# Patient Record
Sex: FEMALE | Race: WHITE | NOT HISPANIC OR LATINO | URBAN - METROPOLITAN AREA
[De-identification: names, ages, dates, MRNs, and addresses within clinical notes are randomized per-mention and may not be internally consistent; named-entity substitution may affect disease eponyms.]

---

## 2017-03-20 ENCOUNTER — EMERGENCY (EMERGENCY)
Facility: HOSPITAL | Age: 27
LOS: 0 days | Discharge: HOME | End: 2017-03-21

## 2017-06-27 DIAGNOSIS — F41.8 OTHER SPECIFIED ANXIETY DISORDERS: ICD-10-CM

## 2017-06-27 DIAGNOSIS — N39.0 URINARY TRACT INFECTION, SITE NOT SPECIFIED: ICD-10-CM

## 2017-06-27 DIAGNOSIS — K21.9 GASTRO-ESOPHAGEAL REFLUX DISEASE WITHOUT ESOPHAGITIS: ICD-10-CM

## 2017-06-27 DIAGNOSIS — Z87.891 PERSONAL HISTORY OF NICOTINE DEPENDENCE: ICD-10-CM

## 2017-06-27 DIAGNOSIS — R30.0 DYSURIA: ICD-10-CM

## 2017-08-08 ENCOUNTER — EMERGENCY (EMERGENCY)
Facility: HOSPITAL | Age: 27
LOS: 0 days | Discharge: HOME | End: 2017-08-08
Admitting: INTERNAL MEDICINE

## 2017-08-08 DIAGNOSIS — Z88.8 ALLERGY STATUS TO OTHER DRUGS, MEDICAMENTS AND BIOLOGICAL SUBSTANCES STATUS: ICD-10-CM

## 2017-08-08 DIAGNOSIS — F17.200 NICOTINE DEPENDENCE, UNSPECIFIED, UNCOMPLICATED: ICD-10-CM

## 2017-08-08 DIAGNOSIS — F31.76 BIPOLAR DISORDER, IN FULL REMISSION, MOST RECENT EPISODE DEPRESSED: ICD-10-CM

## 2017-08-08 DIAGNOSIS — K21.9 GASTRO-ESOPHAGEAL REFLUX DISEASE WITHOUT ESOPHAGITIS: ICD-10-CM

## 2017-08-08 DIAGNOSIS — E03.9 HYPOTHYROIDISM, UNSPECIFIED: ICD-10-CM

## 2017-08-08 DIAGNOSIS — J40 BRONCHITIS, NOT SPECIFIED AS ACUTE OR CHRONIC: ICD-10-CM

## 2017-08-08 DIAGNOSIS — H92.02 OTALGIA, LEFT EAR: ICD-10-CM

## 2017-08-08 DIAGNOSIS — Z79.899 OTHER LONG TERM (CURRENT) DRUG THERAPY: ICD-10-CM

## 2017-08-08 DIAGNOSIS — F10.20 ALCOHOL DEPENDENCE, UNCOMPLICATED: ICD-10-CM

## 2017-08-08 DIAGNOSIS — F32.9 MAJOR DEPRESSIVE DISORDER, SINGLE EPISODE, UNSPECIFIED: ICD-10-CM

## 2017-08-08 DIAGNOSIS — R05 COUGH: ICD-10-CM

## 2017-10-19 ENCOUNTER — EMERGENCY (EMERGENCY)
Facility: HOSPITAL | Age: 27
LOS: 0 days | Discharge: HOME | End: 2017-10-20

## 2017-10-19 DIAGNOSIS — K21.9 GASTRO-ESOPHAGEAL REFLUX DISEASE WITHOUT ESOPHAGITIS: ICD-10-CM

## 2017-10-19 DIAGNOSIS — N39.0 URINARY TRACT INFECTION, SITE NOT SPECIFIED: ICD-10-CM

## 2017-10-19 DIAGNOSIS — F31.76 BIPOLAR DISORDER, IN FULL REMISSION, MOST RECENT EPISODE DEPRESSED: ICD-10-CM

## 2017-10-19 DIAGNOSIS — T40.1X1A POISONING BY HEROIN, ACCIDENTAL (UNINTENTIONAL), INITIAL ENCOUNTER: ICD-10-CM

## 2017-10-19 DIAGNOSIS — R41.82 ALTERED MENTAL STATUS, UNSPECIFIED: ICD-10-CM

## 2017-10-19 DIAGNOSIS — Z79.899 OTHER LONG TERM (CURRENT) DRUG THERAPY: ICD-10-CM

## 2017-10-19 DIAGNOSIS — E05.90 THYROTOXICOSIS, UNSPECIFIED WITHOUT THYROTOXIC CRISIS OR STORM: ICD-10-CM

## 2017-10-19 DIAGNOSIS — F32.9 MAJOR DEPRESSIVE DISORDER, SINGLE EPISODE, UNSPECIFIED: ICD-10-CM

## 2017-10-19 DIAGNOSIS — Z88.8 ALLERGY STATUS TO OTHER DRUGS, MEDICAMENTS AND BIOLOGICAL SUBSTANCES: ICD-10-CM

## 2017-10-19 DIAGNOSIS — T40.5X1A POISONING BY COCAINE, ACCIDENTAL (UNINTENTIONAL), INITIAL ENCOUNTER: ICD-10-CM

## 2017-10-19 DIAGNOSIS — F10.20 ALCOHOL DEPENDENCE, UNCOMPLICATED: ICD-10-CM

## 2017-10-19 DIAGNOSIS — Z87.891 PERSONAL HISTORY OF NICOTINE DEPENDENCE: ICD-10-CM

## 2017-12-23 ENCOUNTER — EMERGENCY (EMERGENCY)
Facility: HOSPITAL | Age: 27
LOS: 0 days | Discharge: HOME | End: 2017-12-23

## 2017-12-23 DIAGNOSIS — Z79.899 OTHER LONG TERM (CURRENT) DRUG THERAPY: ICD-10-CM

## 2017-12-23 DIAGNOSIS — F10.20 ALCOHOL DEPENDENCE, UNCOMPLICATED: ICD-10-CM

## 2017-12-23 DIAGNOSIS — T39.311A POISONING BY PROPIONIC ACID DERIVATIVES, ACCIDENTAL (UNINTENTIONAL), INITIAL ENCOUNTER: ICD-10-CM

## 2017-12-23 DIAGNOSIS — Z87.891 PERSONAL HISTORY OF NICOTINE DEPENDENCE: ICD-10-CM

## 2017-12-23 DIAGNOSIS — E03.9 HYPOTHYROIDISM, UNSPECIFIED: ICD-10-CM

## 2017-12-23 DIAGNOSIS — F31.76 BIPOLAR DISORDER, IN FULL REMISSION, MOST RECENT EPISODE DEPRESSED: ICD-10-CM

## 2017-12-23 DIAGNOSIS — Z86.711 PERSONAL HISTORY OF PULMONARY EMBOLISM: ICD-10-CM

## 2017-12-23 DIAGNOSIS — Z88.8 ALLERGY STATUS TO OTHER DRUGS, MEDICAMENTS AND BIOLOGICAL SUBSTANCES STATUS: ICD-10-CM

## 2017-12-23 DIAGNOSIS — F32.9 MAJOR DEPRESSIVE DISORDER, SINGLE EPISODE, UNSPECIFIED: ICD-10-CM

## 2017-12-23 DIAGNOSIS — K21.9 GASTRO-ESOPHAGEAL REFLUX DISEASE WITHOUT ESOPHAGITIS: ICD-10-CM

## 2018-01-18 ENCOUNTER — OUTPATIENT (OUTPATIENT)
Dept: OUTPATIENT SERVICES | Facility: HOSPITAL | Age: 28
LOS: 1 days | Discharge: HOME | End: 2018-01-18

## 2018-01-18 DIAGNOSIS — F18.20 INHALANT DEPENDENCE, UNCOMPLICATED: ICD-10-CM

## 2018-01-18 DIAGNOSIS — F13.20 SEDATIVE, HYPNOTIC OR ANXIOLYTIC DEPENDENCE, UNCOMPLICATED: ICD-10-CM

## 2018-01-18 DIAGNOSIS — F31.81 BIPOLAR II DISORDER: ICD-10-CM

## 2018-02-04 DIAGNOSIS — F10.20 ALCOHOL DEPENDENCE, UNCOMPLICATED: ICD-10-CM

## 2018-02-04 DIAGNOSIS — F31.76 BIPOLAR DISORDER, IN FULL REMISSION, MOST RECENT EPISODE DEPRESSED: ICD-10-CM

## 2018-06-17 ENCOUNTER — EMERGENCY (EMERGENCY)
Facility: HOSPITAL | Age: 28
LOS: 0 days | Discharge: HOME | End: 2018-06-17
Attending: EMERGENCY MEDICINE | Admitting: EMERGENCY MEDICINE

## 2018-06-17 VITALS
SYSTOLIC BLOOD PRESSURE: 119 MMHG | HEART RATE: 94 BPM | OXYGEN SATURATION: 98 % | RESPIRATION RATE: 18 BRPM | TEMPERATURE: 97 F | DIASTOLIC BLOOD PRESSURE: 63 MMHG

## 2018-06-17 VITALS
HEART RATE: 88 BPM | RESPIRATION RATE: 18 BRPM | OXYGEN SATURATION: 97 % | SYSTOLIC BLOOD PRESSURE: 123 MMHG | TEMPERATURE: 98 F | DIASTOLIC BLOOD PRESSURE: 58 MMHG

## 2018-06-17 DIAGNOSIS — E03.9 HYPOTHYROIDISM, UNSPECIFIED: ICD-10-CM

## 2018-06-17 DIAGNOSIS — E11.9 TYPE 2 DIABETES MELLITUS WITHOUT COMPLICATIONS: ICD-10-CM

## 2018-06-17 DIAGNOSIS — Z79.899 OTHER LONG TERM (CURRENT) DRUG THERAPY: ICD-10-CM

## 2018-06-17 DIAGNOSIS — Z79.84 LONG TERM (CURRENT) USE OF ORAL HYPOGLYCEMIC DRUGS: ICD-10-CM

## 2018-06-17 DIAGNOSIS — Z88.8 ALLERGY STATUS TO OTHER DRUGS, MEDICAMENTS AND BIOLOGICAL SUBSTANCES: ICD-10-CM

## 2018-06-17 DIAGNOSIS — R07.89 OTHER CHEST PAIN: ICD-10-CM

## 2018-06-17 LAB
ALBUMIN SERPL ELPH-MCNC: 3.1 G/DL — LOW (ref 3.5–5.2)
ALP SERPL-CCNC: 56 U/L — SIGNIFICANT CHANGE UP (ref 30–115)
ALT FLD-CCNC: 26 U/L — SIGNIFICANT CHANGE UP (ref 0–41)
ANION GAP SERPL CALC-SCNC: 13 MMOL/L — SIGNIFICANT CHANGE UP (ref 7–14)
APTT BLD: 36.7 SEC — SIGNIFICANT CHANGE UP (ref 27–39.2)
AST SERPL-CCNC: 18 U/L — SIGNIFICANT CHANGE UP (ref 0–41)
BASOPHILS # BLD AUTO: 0.01 K/UL — SIGNIFICANT CHANGE UP (ref 0–0.2)
BASOPHILS NFR BLD AUTO: 0.1 % — SIGNIFICANT CHANGE UP (ref 0–1)
BILIRUB SERPL-MCNC: <0.2 MG/DL — SIGNIFICANT CHANGE UP (ref 0.2–1.2)
BUN SERPL-MCNC: 9 MG/DL — LOW (ref 10–20)
CALCIUM SERPL-MCNC: 8.7 MG/DL — SIGNIFICANT CHANGE UP (ref 8.5–10.1)
CHLORIDE SERPL-SCNC: 104 MMOL/L — SIGNIFICANT CHANGE UP (ref 98–110)
CK MB CFR SERPL CALC: <1 NG/ML — SIGNIFICANT CHANGE UP (ref 0.6–6.3)
CK SERPL-CCNC: 57 U/L — SIGNIFICANT CHANGE UP (ref 0–225)
CO2 SERPL-SCNC: 22 MMOL/L — SIGNIFICANT CHANGE UP (ref 17–32)
CREAT SERPL-MCNC: 0.6 MG/DL — LOW (ref 0.7–1.5)
D DIMER BLD IA.RAPID-MCNC: 96 NG/ML DDU — SIGNIFICANT CHANGE UP (ref 0–230)
EOSINOPHIL # BLD AUTO: 0.27 K/UL — SIGNIFICANT CHANGE UP (ref 0–0.7)
EOSINOPHIL NFR BLD AUTO: 3.2 % — SIGNIFICANT CHANGE UP (ref 0–8)
GLUCOSE SERPL-MCNC: 177 MG/DL — HIGH (ref 70–99)
HCT VFR BLD CALC: 38.2 % — SIGNIFICANT CHANGE UP (ref 37–47)
HGB BLD-MCNC: 13 G/DL — SIGNIFICANT CHANGE UP (ref 12–16)
IMM GRANULOCYTES NFR BLD AUTO: 0.1 % — SIGNIFICANT CHANGE UP (ref 0.1–0.3)
INR BLD: 1.04 RATIO — SIGNIFICANT CHANGE UP (ref 0.65–1.3)
LYMPHOCYTES # BLD AUTO: 2.45 K/UL — SIGNIFICANT CHANGE UP (ref 1.2–3.4)
LYMPHOCYTES # BLD AUTO: 29.3 % — SIGNIFICANT CHANGE UP (ref 20.5–51.1)
MCHC RBC-ENTMCNC: 28.2 PG — SIGNIFICANT CHANGE UP (ref 27–31)
MCHC RBC-ENTMCNC: 34 G/DL — SIGNIFICANT CHANGE UP (ref 32–37)
MCV RBC AUTO: 82.9 FL — SIGNIFICANT CHANGE UP (ref 81–99)
MONOCYTES # BLD AUTO: 0.63 K/UL — HIGH (ref 0.1–0.6)
MONOCYTES NFR BLD AUTO: 7.5 % — SIGNIFICANT CHANGE UP (ref 1.7–9.3)
NEUTROPHILS # BLD AUTO: 5 K/UL — SIGNIFICANT CHANGE UP (ref 1.4–6.5)
NEUTROPHILS NFR BLD AUTO: 59.8 % — SIGNIFICANT CHANGE UP (ref 42.2–75.2)
PLATELET # BLD AUTO: 227 K/UL — SIGNIFICANT CHANGE UP (ref 130–400)
POTASSIUM SERPL-MCNC: 4.1 MMOL/L — SIGNIFICANT CHANGE UP (ref 3.5–5)
POTASSIUM SERPL-SCNC: 4.1 MMOL/L — SIGNIFICANT CHANGE UP (ref 3.5–5)
PROT SERPL-MCNC: 6.6 G/DL — SIGNIFICANT CHANGE UP (ref 6–8)
PROTHROM AB SERPL-ACNC: 11.2 SEC — SIGNIFICANT CHANGE UP (ref 9.95–12.87)
RBC # BLD: 4.61 M/UL — SIGNIFICANT CHANGE UP (ref 4.2–5.4)
RBC # FLD: 12.6 % — SIGNIFICANT CHANGE UP (ref 11.5–14.5)
SODIUM SERPL-SCNC: 139 MMOL/L — SIGNIFICANT CHANGE UP (ref 135–146)
TROPONIN T SERPL-MCNC: <0.01 NG/ML — SIGNIFICANT CHANGE UP
WBC # BLD: 8.37 K/UL — SIGNIFICANT CHANGE UP (ref 4.8–10.8)
WBC # FLD AUTO: 8.37 K/UL — SIGNIFICANT CHANGE UP (ref 4.8–10.8)

## 2018-06-17 NOTE — ED ADULT NURSE NOTE - CHIEF COMPLAINT QUOTE
Pt came c/o left sided chest pain started 3 hours ago, denies SOB, has history of PE, received ASA x 1 from [facility].

## 2018-06-17 NOTE — ED ADULT TRIAGE NOTE - CHIEF COMPLAINT QUOTE
Pt came c/o left sided chest pain started 3 hours ago, denies SOB, has history of PE, received ASA x 1 from [facility].

## 2018-06-17 NOTE — ED PROVIDER NOTE - OBJECTIVE STATEMENT
28yo F with PMHx PE/DVT (6 years ago, no longer on AC), hyperthyroid, PCOS, hep C / IVDA / substance abuse (meth, delgado dust), bipolar, p/w chest pain x3 hours. Pain is left-sided, felt like "fluttering" then pressure. Pt was concerned because she felt similar symptoms when she had PE years ago. Patient currently checked into [facility] for rehab, last IVDA was 11 days ago (injects into arm and breast). Denies fever, palpitations, SOB, cough. Unknown LMP, pt states are irregular. Denies headache, lightheadedness, nausea, vomiting, diarrhea, abd pain, dysuria, hematuria.

## 2018-06-17 NOTE — ED PROVIDER NOTE - NS ED ROS FT
Constitutional: No fever, chills.  ENMT:  No neck pain  Cardiac:  +chest pain  Respiratory:  No cough, SOB  GI:  No nausea, vomiting, diarrhea, abdominal pain  :  No dysuria, hematuria  MS:  No back pain  Neuro:  No headache or lightheadedness  Skin:  No skin rash  Endocrine: +h/o hyperthyroid  Except as documented in the HPI,  all other systems are negative

## 2018-06-17 NOTE — ED PROVIDER NOTE - PHYSICAL EXAMINATION
Vital signs reviewed  GENERAL: Patient well appearing, NAD, lying in stretcher comfortably  HEAD: NCAT  ENT: MMM  NECK: Supple, non tender  CHEST: Mild TTP over anterior chest wall.   RESPIRATORY: Normal respiratory effort. CTA B/L. No wheezing, rales, rhonchi  CARDIOVASCULAR: No murmur. Regular rate and rhythm. Normal S1/S2  ABDOMEN: Soft. Nondistended. Nontender. No guarding or rebound  MUSCULOSKELETAL/EXTREMITIES: Brisk cap refill. 2+ radial pulses. No leg edema  SKIN:  Warm and dry. Ecchymosis over right breast (injection site) without erythema, fluctuance, induration, nontender.  NEURO: AAOx3. No gross FND  PSYCHIATRIC: Cooperative. Affect appropriate

## 2018-06-17 NOTE — ED PROVIDER NOTE - ATTENDING CONTRIBUTION TO CARE
26 yo female h/o hyperthyroidism, heroin abuse ( currently in rehab), DVT/PE 7 years ago (used to take birth control pills, took, coumadin for 6 months, no clotting episodes since) sent here for evaluation of brief point left sided CP while sitting,.  Resolved spontaneously, non-radiating, no associated SOB, cough, hemoptysis, leg pain or swelling, no change in exercise tolerance.   Asymptomatic at present,  Well-appearing, obese young female, NAD, PERRL, mmm, nml work of breathing, lungs CTA b/l, equal air entry, speaking full sentences, RRR, no leg swelling or calf ttp, capillary refill < 2 sec, distal pulses intact, ambulating in ED without dyspnea or tachypnea Work up negative in ED including a normal d-dimer.  Results were d/c patient , strict return precautions given,  She verbalized understanding and is amenable with the plan.

## 2018-06-17 NOTE — ED ADULT NURSE NOTE - OBJECTIVE STATEMENT
Pt complaining of left sided chest pain radiating to back, +sob, +nausea/vomiting last s/s total of 30 minutes

## 2018-07-14 ENCOUNTER — EMERGENCY (EMERGENCY)
Facility: HOSPITAL | Age: 28
LOS: 0 days | Discharge: HOME | End: 2018-07-14
Attending: EMERGENCY MEDICINE

## 2018-07-14 VITALS
OXYGEN SATURATION: 95 % | RESPIRATION RATE: 18 BRPM | DIASTOLIC BLOOD PRESSURE: 58 MMHG | SYSTOLIC BLOOD PRESSURE: 112 MMHG | HEART RATE: 81 BPM

## 2018-07-14 VITALS
WEIGHT: 293 LBS | RESPIRATION RATE: 18 BRPM | TEMPERATURE: 99 F | HEART RATE: 123 BPM | SYSTOLIC BLOOD PRESSURE: 170 MMHG | HEIGHT: 67 IN | DIASTOLIC BLOOD PRESSURE: 113 MMHG | OXYGEN SATURATION: 98 %

## 2018-07-14 DIAGNOSIS — Z02.9 ENCOUNTER FOR ADMINISTRATIVE EXAMINATIONS, UNSPECIFIED: ICD-10-CM

## 2018-07-14 LAB
ALBUMIN SERPL ELPH-MCNC: 3.6 G/DL — SIGNIFICANT CHANGE UP (ref 3.5–5.2)
ALP SERPL-CCNC: 76 U/L — SIGNIFICANT CHANGE UP (ref 30–115)
ALT FLD-CCNC: 22 U/L — SIGNIFICANT CHANGE UP (ref 0–41)
ANION GAP SERPL CALC-SCNC: 18 MMOL/L — HIGH (ref 7–14)
APAP SERPL-MCNC: <5 UG/ML — LOW (ref 10–30)
AST SERPL-CCNC: 21 U/L — SIGNIFICANT CHANGE UP (ref 0–41)
BILIRUB SERPL-MCNC: 0.3 MG/DL — SIGNIFICANT CHANGE UP (ref 0.2–1.2)
BUN SERPL-MCNC: 14 MG/DL — SIGNIFICANT CHANGE UP (ref 10–20)
CALCIUM SERPL-MCNC: 8.9 MG/DL — SIGNIFICANT CHANGE UP (ref 8.5–10.1)
CHLORIDE SERPL-SCNC: 102 MMOL/L — SIGNIFICANT CHANGE UP (ref 98–110)
CK MB CFR SERPL CALC: 1.3 NG/ML — SIGNIFICANT CHANGE UP (ref 0.6–6.3)
CK SERPL-CCNC: 122 U/L — SIGNIFICANT CHANGE UP (ref 0–225)
CO2 SERPL-SCNC: 21 MMOL/L — SIGNIFICANT CHANGE UP (ref 17–32)
CREAT SERPL-MCNC: 0.9 MG/DL — SIGNIFICANT CHANGE UP (ref 0.7–1.5)
ETHANOL SERPL-MCNC: <10 MG/DL — HIGH
GLUCOSE SERPL-MCNC: 138 MG/DL — HIGH (ref 70–99)
HCT VFR BLD CALC: 38 % — SIGNIFICANT CHANGE UP (ref 37–47)
HGB BLD-MCNC: 12.6 G/DL — SIGNIFICANT CHANGE UP (ref 12–16)
MCHC RBC-ENTMCNC: 27.6 PG — SIGNIFICANT CHANGE UP (ref 27–31)
MCHC RBC-ENTMCNC: 33.2 G/DL — SIGNIFICANT CHANGE UP (ref 32–37)
MCV RBC AUTO: 83.3 FL — SIGNIFICANT CHANGE UP (ref 81–99)
NRBC # BLD: 0 /100 WBCS — SIGNIFICANT CHANGE UP (ref 0–0)
PLATELET # BLD AUTO: 319 K/UL — SIGNIFICANT CHANGE UP (ref 130–400)
POTASSIUM SERPL-MCNC: 3.7 MMOL/L — SIGNIFICANT CHANGE UP (ref 3.5–5)
POTASSIUM SERPL-SCNC: 3.7 MMOL/L — SIGNIFICANT CHANGE UP (ref 3.5–5)
PROT SERPL-MCNC: 7.7 G/DL — SIGNIFICANT CHANGE UP (ref 6–8)
RBC # BLD: 4.56 M/UL — SIGNIFICANT CHANGE UP (ref 4.2–5.4)
RBC # FLD: 13.1 % — SIGNIFICANT CHANGE UP (ref 11.5–14.5)
SALICYLATES SERPL-MCNC: <0.3 MG/DL — LOW (ref 4–30)
SODIUM SERPL-SCNC: 141 MMOL/L — SIGNIFICANT CHANGE UP (ref 135–146)
TROPONIN T SERPL-MCNC: <0.01 NG/ML — SIGNIFICANT CHANGE UP
WBC # BLD: 13.77 K/UL — HIGH (ref 4.8–10.8)
WBC # FLD AUTO: 13.77 K/UL — HIGH (ref 4.8–10.8)

## 2018-07-14 RX ORDER — SODIUM CHLORIDE 9 MG/ML
1000 INJECTION INTRAMUSCULAR; INTRAVENOUS; SUBCUTANEOUS ONCE
Qty: 0 | Refills: 0 | Status: COMPLETED | OUTPATIENT
Start: 2018-07-14 | End: 2018-07-14

## 2018-07-14 RX ADMIN — SODIUM CHLORIDE 1000 MILLILITER(S): 9 INJECTION INTRAMUSCULAR; INTRAVENOUS; SUBCUTANEOUS at 01:38

## 2018-07-14 NOTE — ED PROVIDER NOTE - NS ED ROS FT
Review of Systems  Constitutional:  No fever or chills.   Eyes:  Negative.   ENMT:  No nasal congestion, discharge, or throat pain.  Cardiac:  No chest pain, palpitations, or edema.  Respiratory:  No dyspnea, wheezing, or cough. No hemoptysis.  GI:  No vomiting, diarrhea, or abdominal pain. No melena or hematochezia.  :  No dysuria or hematuria.   Musculoskeletal:  No joint swelling, joint pain, or back pain.  Skin:  No skin rash, jaundice, or lesions.  Neuro:  No headache, loss of sensation, or focal weakness.

## 2018-07-14 NOTE — ED PROVIDER NOTE - OBJECTIVE STATEMENT
27f w a hx of hypeorthyroid, HepC, PCOS, DM, & cocaine/heroin abuse presents after unintentional IV heroin OD just prior to arrival. Pt found by friend and given naloxone by EMS w return to baseline mental status. Pt reports feeling well at this time and denies any symptoms. Patient denies SI/HI, denies any self-harm attempt & denies AV hallucinations. No trauma. 27f w a hx of hyperthyroid, HepC, PCOS, DM, & cocaine/heroin abuse presents after unintentional IV heroin OD just prior to arrival. Pt found by friend and given naloxone by EMS w return to baseline mental status. Pt reports feeling well at this time and denies any symptoms. Patient denies SI/HI, denies any self-harm attempt & denies AV hallucinations. No trauma.

## 2018-07-14 NOTE — ED PROVIDER NOTE - MEDICAL DECISION MAKING DETAILS
27f w unintentional IV heroin overdose now back to baseline after naloxone. Also w hx of cocaine use. Pt initially mild hypertensive & tachycardic possibly from using cocaine as well. Labs & EKG reviewed. IV fluids given. Pt observed w resolution of symptoms. Pt advised regarding symptomatic/supportive care, importance of PMD/Detox f/u, and symptoms to prompt ED return.

## 2018-07-14 NOTE — ED PROVIDER NOTE - CARE PLAN
Principal Discharge DX:	Accidental overdose of heroin, initial encounter  Assessment and plan of treatment:	27f w unintentional IV heroin overdose now back to baseline after naloxone. nontoxic appearing, nonfocal neuro, no evidence of trauma. --CBC, CMP, HCG, ASA/APAP, EKG. -- observe/re-assess, likely dc symptomatic and supportive care, f/u PMD/Detox  Secondary Diagnosis:	Cocaine abuse  Secondary Diagnosis:	Opioid abuse

## 2018-07-14 NOTE — ED PROVIDER NOTE - PLAN OF CARE
27f w unintentional IV heroin overdose now back to baseline after naloxone. nontoxic appearing, nonfocal neuro, no evidence of trauma. --CBC, CMP, HCG, ASA/APAP, EKG. -- observe/re-assess, likely dc symptomatic and supportive care, f/u PMD/Detox

## 2018-07-14 NOTE — ED PROVIDER NOTE - PMH
Cocaine abuse    Depression    Diabetes    Hepatitis C    Hyperthyroidism    Opioid abuse    PCOS (polycystic ovarian syndrome)

## 2018-07-14 NOTE — ED PROVIDER NOTE - PHYSICAL EXAMINATION
Physical Exam  General: Awake, alert, NAD, WDWN, NCAT, no skull/facial tender, no step-offs, no raccoon/raza, non-toxic appearing  Eyes: PERRL 2mm, EOMI, no icterus/nystagmus, lids and conjunctivae are normal  ENT: External inspection normal, pink/dry membranes, pharynx normal  CV: S1S2, regular rate and rhythm, no murmur/gallops/rubs, no JVD, 2+ pulses b/l, no edema/cords/homans, warm/well-perfused  Respiratory: Normal respiratory rate/effort, no respiratory distress, normal voice, speaking full sentences, lungs clear to auscultation b/l, no wheezing/rales/rhonchi, no retractions, no stridor  Abdomen: Soft abdomen, no tender/distended/guarding/rebound, no CVA tender  Musculoskeletal: FROM all 4 extremities, N/V intact, pelvis stable, no TLS spinal tender/deform/step-offs, no mohit tender/deform, stable gait  Neck: FROM neck, supple, no meningismus, trachea midline, no JVD, no cspine tender/step-offs  Integumentary: Color normal for race, warm and dry, no rash  Neuro: Oriented x3, CN 2-12 intact, normal motor, normal sensory, normal gait, normal cerebellar, no clonus/rigidity/hyper-reflexia.  Psych: Oriented x3, affect normal, denies SI/HI, denies AV hallucinations

## 2018-07-14 NOTE — ED ADULT NURSE REASSESSMENT NOTE - NS ED NURSE REASSESS COMMENT FT1
Pt received awake in bed AAOx3. Pt ambulating steadily to bathroom w/ writer; c/o nausea.
Pt discharged; V/S reassessed and documented. Pt verbalized feeling better; AAOx3 breathing RA w/ normal WOB. Narcan kit offered, excepted and training given by writer. Pt steady gait w/ ambulation. Tolerating PO intake fluids/ solids. Car service transportation provided for transport.

## 2018-07-16 ENCOUNTER — INPATIENT (INPATIENT)
Facility: HOSPITAL | Age: 28
LOS: 14 days | Discharge: HOME | End: 2018-07-31
Attending: INTERNAL MEDICINE | Admitting: INTERNAL MEDICINE

## 2018-07-16 VITALS
WEIGHT: 293 LBS | DIASTOLIC BLOOD PRESSURE: 56 MMHG | RESPIRATION RATE: 16 BRPM | HEART RATE: 84 BPM | HEIGHT: 67 IN | SYSTOLIC BLOOD PRESSURE: 119 MMHG | TEMPERATURE: 98 F

## 2018-07-16 DIAGNOSIS — F14.20 COCAINE DEPENDENCE, UNCOMPLICATED: ICD-10-CM

## 2018-07-16 DIAGNOSIS — E66.9 OBESITY, UNSPECIFIED: ICD-10-CM

## 2018-07-16 DIAGNOSIS — E05.90 THYROTOXICOSIS, UNSPECIFIED WITHOUT THYROTOXIC CRISIS OR STORM: ICD-10-CM

## 2018-07-16 DIAGNOSIS — Z88.8 ALLERGY STATUS TO OTHER DRUGS, MEDICAMENTS AND BIOLOGICAL SUBSTANCES: ICD-10-CM

## 2018-07-16 DIAGNOSIS — F11.20 OPIOID DEPENDENCE, UNCOMPLICATED: ICD-10-CM

## 2018-07-16 DIAGNOSIS — I10 ESSENTIAL (PRIMARY) HYPERTENSION: ICD-10-CM

## 2018-07-16 DIAGNOSIS — F32.9 MAJOR DEPRESSIVE DISORDER, SINGLE EPISODE, UNSPECIFIED: ICD-10-CM

## 2018-07-16 DIAGNOSIS — F11.10 OPIOID ABUSE, UNCOMPLICATED: ICD-10-CM

## 2018-07-16 DIAGNOSIS — B19.21 UNSPECIFIED VIRAL HEPATITIS C WITH HEPATIC COMA: ICD-10-CM

## 2018-07-16 DIAGNOSIS — E13.9 OTHER SPECIFIED DIABETES MELLITUS WITHOUT COMPLICATIONS: ICD-10-CM

## 2018-07-16 DIAGNOSIS — Z51.89 ENCOUNTER FOR OTHER SPECIFIED AFTERCARE: ICD-10-CM

## 2018-07-16 PROBLEM — F14.10 COCAINE ABUSE, UNCOMPLICATED: Chronic | Status: ACTIVE | Noted: 2018-07-14

## 2018-07-16 PROBLEM — B19.20 UNSPECIFIED VIRAL HEPATITIS C WITHOUT HEPATIC COMA: Chronic | Status: ACTIVE | Noted: 2018-07-14

## 2018-07-16 LAB
ALBUMIN SERPL ELPH-MCNC: 3.5 G/DL — SIGNIFICANT CHANGE UP (ref 3.5–5.2)
ALP SERPL-CCNC: 72 U/L — SIGNIFICANT CHANGE UP (ref 30–115)
ALT FLD-CCNC: 38 U/L — SIGNIFICANT CHANGE UP (ref 0–41)
ANION GAP SERPL CALC-SCNC: 15 MMOL/L — HIGH (ref 7–14)
APPEARANCE UR: CLEAR — SIGNIFICANT CHANGE UP
AST SERPL-CCNC: 38 U/L — SIGNIFICANT CHANGE UP (ref 0–41)
BASOPHILS # BLD AUTO: 0.01 K/UL — SIGNIFICANT CHANGE UP (ref 0–0.2)
BASOPHILS NFR BLD AUTO: 0.1 % — SIGNIFICANT CHANGE UP (ref 0–1)
BILIRUB SERPL-MCNC: 0.2 MG/DL — SIGNIFICANT CHANGE UP (ref 0.2–1.2)
BILIRUB UR-MCNC: NEGATIVE — SIGNIFICANT CHANGE UP
BUN SERPL-MCNC: 10 MG/DL — SIGNIFICANT CHANGE UP (ref 10–20)
CALCIUM SERPL-MCNC: 8.9 MG/DL — SIGNIFICANT CHANGE UP (ref 8.5–10.1)
CHLORIDE SERPL-SCNC: 106 MMOL/L — SIGNIFICANT CHANGE UP (ref 98–110)
CO2 SERPL-SCNC: 21 MMOL/L — SIGNIFICANT CHANGE UP (ref 17–32)
COLOR SPEC: YELLOW — SIGNIFICANT CHANGE UP
CREAT SERPL-MCNC: 0.7 MG/DL — SIGNIFICANT CHANGE UP (ref 0.7–1.5)
DIFF PNL FLD: NEGATIVE — SIGNIFICANT CHANGE UP
EOSINOPHIL # BLD AUTO: 0.49 K/UL — SIGNIFICANT CHANGE UP (ref 0–0.7)
EOSINOPHIL NFR BLD AUTO: 4.9 % — SIGNIFICANT CHANGE UP (ref 0–8)
ETHANOL SERPL-MCNC: <10 MG/DL — HIGH
GLUCOSE SERPL-MCNC: 112 MG/DL — HIGH (ref 70–99)
GLUCOSE UR QL: NEGATIVE MG/DL — SIGNIFICANT CHANGE UP
HCG UR QL: NEGATIVE — SIGNIFICANT CHANGE UP
HCT VFR BLD CALC: 37 % — SIGNIFICANT CHANGE UP (ref 37–47)
HGB BLD-MCNC: 12.2 G/DL — SIGNIFICANT CHANGE UP (ref 12–16)
IMM GRANULOCYTES NFR BLD AUTO: 0.5 % — HIGH (ref 0.1–0.3)
KETONES UR-MCNC: NEGATIVE — SIGNIFICANT CHANGE UP
LEUKOCYTE ESTERASE UR-ACNC: NEGATIVE — SIGNIFICANT CHANGE UP
LYMPHOCYTES # BLD AUTO: 2.42 K/UL — SIGNIFICANT CHANGE UP (ref 1.2–3.4)
LYMPHOCYTES # BLD AUTO: 24.4 % — SIGNIFICANT CHANGE UP (ref 20.5–51.1)
MAGNESIUM SERPL-MCNC: 2.1 MG/DL — SIGNIFICANT CHANGE UP (ref 1.8–2.4)
MCHC RBC-ENTMCNC: 27.8 PG — SIGNIFICANT CHANGE UP (ref 27–31)
MCHC RBC-ENTMCNC: 33 G/DL — SIGNIFICANT CHANGE UP (ref 32–37)
MCV RBC AUTO: 84.3 FL — SIGNIFICANT CHANGE UP (ref 81–99)
MONOCYTES # BLD AUTO: 0.51 K/UL — SIGNIFICANT CHANGE UP (ref 0.1–0.6)
MONOCYTES NFR BLD AUTO: 5.2 % — SIGNIFICANT CHANGE UP (ref 1.7–9.3)
NEUTROPHILS # BLD AUTO: 6.42 K/UL — SIGNIFICANT CHANGE UP (ref 1.4–6.5)
NEUTROPHILS NFR BLD AUTO: 64.9 % — SIGNIFICANT CHANGE UP (ref 42.2–75.2)
NITRITE UR-MCNC: NEGATIVE — SIGNIFICANT CHANGE UP
PCP SPEC-MCNC: SIGNIFICANT CHANGE UP
PH UR: 8.5 — SIGNIFICANT CHANGE UP (ref 5–8)
PLATELET # BLD AUTO: 290 K/UL — SIGNIFICANT CHANGE UP (ref 130–400)
POTASSIUM SERPL-MCNC: 4 MMOL/L — SIGNIFICANT CHANGE UP (ref 3.5–5)
POTASSIUM SERPL-SCNC: 4 MMOL/L — SIGNIFICANT CHANGE UP (ref 3.5–5)
PROT SERPL-MCNC: 7.3 G/DL — SIGNIFICANT CHANGE UP (ref 6–8)
PROT UR-MCNC: NEGATIVE MG/DL — SIGNIFICANT CHANGE UP
RBC # BLD: 4.39 M/UL — SIGNIFICANT CHANGE UP (ref 4.2–5.4)
RBC # FLD: 13.1 % — SIGNIFICANT CHANGE UP (ref 11.5–14.5)
SODIUM SERPL-SCNC: 142 MMOL/L — SIGNIFICANT CHANGE UP (ref 135–146)
SP GR SPEC: 1.01 — SIGNIFICANT CHANGE UP (ref 1.01–1.03)
UROBILINOGEN FLD QL: 0.2 MG/DL — SIGNIFICANT CHANGE UP (ref 0.2–0.2)
WBC # BLD: 9.9 K/UL — SIGNIFICANT CHANGE UP (ref 4.8–10.8)
WBC # FLD AUTO: 9.9 K/UL — SIGNIFICANT CHANGE UP (ref 4.8–10.8)

## 2018-07-16 RX ORDER — PROPYLTHIOURACIL 50 MG
50 TABLET ORAL EVERY 8 HOURS
Qty: 0 | Refills: 0 | Status: DISCONTINUED | OUTPATIENT
Start: 2018-07-16 | End: 2018-07-19

## 2018-07-16 RX ORDER — METFORMIN HYDROCHLORIDE 850 MG/1
0 TABLET ORAL
Qty: 0 | Refills: 0 | COMMUNITY

## 2018-07-16 RX ORDER — ARIPIPRAZOLE 15 MG/1
0 TABLET ORAL
Qty: 0 | Refills: 0 | COMMUNITY

## 2018-07-16 RX ORDER — HYDROXYZINE HCL 10 MG
50 TABLET ORAL
Qty: 0 | Refills: 0 | Status: DISCONTINUED | OUTPATIENT
Start: 2018-07-16 | End: 2018-07-19

## 2018-07-16 RX ORDER — ACETAMINOPHEN 500 MG
650 TABLET ORAL EVERY 6 HOURS
Qty: 0 | Refills: 0 | Status: DISCONTINUED | OUTPATIENT
Start: 2018-07-16 | End: 2018-07-19

## 2018-07-16 RX ORDER — PANTOPRAZOLE SODIUM 20 MG/1
40 TABLET, DELAYED RELEASE ORAL
Qty: 0 | Refills: 0 | Status: DISCONTINUED | OUTPATIENT
Start: 2018-07-16 | End: 2018-07-19

## 2018-07-16 RX ORDER — ESCITALOPRAM OXALATE 10 MG/1
20 TABLET, FILM COATED ORAL AT BEDTIME
Qty: 0 | Refills: 0 | Status: DISCONTINUED | OUTPATIENT
Start: 2018-07-16 | End: 2018-07-19

## 2018-07-16 RX ORDER — METHADONE HYDROCHLORIDE 40 MG/1
10 TABLET ORAL EVERY 12 HOURS
Qty: 0 | Refills: 0 | Status: DISCONTINUED | OUTPATIENT
Start: 2018-07-17 | End: 2018-07-17

## 2018-07-16 RX ORDER — ESCITALOPRAM OXALATE 10 MG/1
0 TABLET, FILM COATED ORAL
Qty: 0 | Refills: 0 | COMMUNITY

## 2018-07-16 RX ORDER — METFORMIN HYDROCHLORIDE 850 MG/1
500 TABLET ORAL
Qty: 0 | Refills: 0 | Status: DISCONTINUED | OUTPATIENT
Start: 2018-07-16 | End: 2018-07-19

## 2018-07-16 RX ORDER — AMLODIPINE BESYLATE 2.5 MG/1
2.5 TABLET ORAL AT BEDTIME
Qty: 0 | Refills: 0 | Status: DISCONTINUED | OUTPATIENT
Start: 2018-07-16 | End: 2018-07-19

## 2018-07-16 RX ORDER — METHADONE HYDROCHLORIDE 40 MG/1
5 TABLET ORAL
Qty: 0 | Refills: 0 | Status: DISCONTINUED | OUTPATIENT
Start: 2018-07-16 | End: 2018-07-16

## 2018-07-16 RX ORDER — METHADONE HYDROCHLORIDE 40 MG/1
10 TABLET ORAL ONCE
Qty: 0 | Refills: 0 | Status: DISCONTINUED | OUTPATIENT
Start: 2018-07-16 | End: 2018-07-16

## 2018-07-16 RX ORDER — IBUPROFEN 200 MG
400 TABLET ORAL EVERY 6 HOURS
Qty: 0 | Refills: 0 | Status: DISCONTINUED | OUTPATIENT
Start: 2018-07-16 | End: 2018-07-19

## 2018-07-16 RX ORDER — METHADONE HYDROCHLORIDE 40 MG/1
TABLET ORAL
Qty: 0 | Refills: 0 | Status: COMPLETED | OUTPATIENT
Start: 2018-07-17 | End: 2018-07-19

## 2018-07-16 RX ORDER — HYDROXYZINE HCL 10 MG
50 TABLET ORAL AT BEDTIME
Qty: 0 | Refills: 0 | Status: DISCONTINUED | OUTPATIENT
Start: 2018-07-16 | End: 2018-07-19

## 2018-07-16 RX ORDER — TOPIRAMATE 25 MG
100 TABLET ORAL
Qty: 0 | Refills: 0 | Status: DISCONTINUED | OUTPATIENT
Start: 2018-07-16 | End: 2018-07-19

## 2018-07-16 RX ORDER — METHADONE HYDROCHLORIDE 40 MG/1
5 TABLET ORAL EVERY 12 HOURS
Qty: 0 | Refills: 0 | Status: DISCONTINUED | OUTPATIENT
Start: 2018-07-17 | End: 2018-07-19

## 2018-07-16 RX ORDER — ARIPIPRAZOLE 15 MG/1
30 TABLET ORAL AT BEDTIME
Qty: 0 | Refills: 0 | Status: DISCONTINUED | OUTPATIENT
Start: 2018-07-16 | End: 2018-07-19

## 2018-07-16 RX ADMIN — METFORMIN HYDROCHLORIDE 500 MILLIGRAM(S): 850 TABLET ORAL at 13:02

## 2018-07-16 RX ADMIN — ARIPIPRAZOLE 30 MILLIGRAM(S): 15 TABLET ORAL at 20:56

## 2018-07-16 RX ADMIN — Medication 1 TABLET(S): at 20:58

## 2018-07-16 RX ADMIN — METHADONE HYDROCHLORIDE 10 MILLIGRAM(S): 40 TABLET ORAL at 12:31

## 2018-07-16 RX ADMIN — Medication 50 MILLIGRAM(S): at 21:00

## 2018-07-16 RX ADMIN — METFORMIN HYDROCHLORIDE 500 MILLIGRAM(S): 850 TABLET ORAL at 17:20

## 2018-07-16 RX ADMIN — ESCITALOPRAM OXALATE 20 MILLIGRAM(S): 10 TABLET, FILM COATED ORAL at 20:57

## 2018-07-16 RX ADMIN — PANTOPRAZOLE SODIUM 40 MILLIGRAM(S): 20 TABLET, DELAYED RELEASE ORAL at 20:57

## 2018-07-16 RX ADMIN — AMLODIPINE BESYLATE 2.5 MILLIGRAM(S): 2.5 TABLET ORAL at 20:56

## 2018-07-16 RX ADMIN — Medication 100 MILLIGRAM(S): at 20:57

## 2018-07-16 NOTE — H&P ADULT - NSHPREVIEWOFSYSTEMS_GEN_ALL_CORE
General:	no fever, weight loss,  chills  Skin: no rash, ulcers  Ophthalmologic: no visual changes  ENMT:	no sore throat  Respiratory and Thorax: no cough, wheeze,  sob  Cardiovascular:	no chest pain, palpitations, dizziness  Gastrointestinal:	no nausea, vomiting, +diarrhea, no abd pain  Genitourinary:	no dysuria, hematuria  Musculoskeletal:	no joint pains  Neurological:	 no speech disturbance, focal weakness, numbness  Psychiatric:	no depression, anxiety, psychosis  Hematology/Lymphatics:	no anemia  Endocrine:	no polyuria, polydipsia

## 2018-07-16 NOTE — H&P ADULT - HISTORY OF PRESENT ILLNESS
26yo F presents for detox  heroin 2-3 bag IV 4 times last week otherwise has been using 1-2x per week over last month.  +cocaine use  denies alcohol, benzo use  denies seizure  denies HIV, syphillis, or +PPD  +hx of BPD compliant with meds, denies HI/SI/AH, no recent IPP admits

## 2018-07-16 NOTE — H&P ADULT - NSHPPHYSICALEXAM_GEN_ALL_CORE
T98  /82  P 82  RR 16      Constitutional: A&Ox4  Eyes: PERRLA, EOM intact  ENMT: no sore throat  Neck: no tenderness  Back: no spinal tenderness  Respiratory: cta b/l  Cardiovascular: s1 s2 rrr  Gastrointestinal: soft nt  nd + bs no rebound or guarding  Genitourinary: no cva tenderness  Extremities: normal rom, no edema, calf tenderness  Vascular: no cyanosis   Neurological:no focal deficits  Skin: no rash  Lymph Nodes: no lymphadenopathy  Musculoskeletal: no joint swelling  Psychiatric: no psychosis, depressed mood

## 2018-07-17 LAB
ESTIMATED AVERAGE GLUCOSE: 154 MG/DL — HIGH (ref 68–114)
HAV IGM SER-ACNC: SIGNIFICANT CHANGE UP
HBA1C BLD-MCNC: 7 % — HIGH (ref 4–5.6)
HBV CORE IGM SER-ACNC: SIGNIFICANT CHANGE UP
HBV SURFACE AG SER-ACNC: SIGNIFICANT CHANGE UP
HCV AB S/CO SERPL IA: 12.02 S/CO — SIGNIFICANT CHANGE UP
HCV AB SERPL-IMP: REACTIVE
HIV 1+2 AB+HIV1 P24 AG SERPL QL IA: SIGNIFICANT CHANGE UP
T PALLIDUM AB TITR SER: NEGATIVE — SIGNIFICANT CHANGE UP

## 2018-07-17 RX ADMIN — METFORMIN HYDROCHLORIDE 500 MILLIGRAM(S): 850 TABLET ORAL at 16:35

## 2018-07-17 RX ADMIN — ARIPIPRAZOLE 30 MILLIGRAM(S): 15 TABLET ORAL at 21:36

## 2018-07-17 RX ADMIN — Medication 100 MILLIGRAM(S): at 09:21

## 2018-07-17 RX ADMIN — Medication 400 MILLIGRAM(S): at 21:40

## 2018-07-17 RX ADMIN — ESCITALOPRAM OXALATE 20 MILLIGRAM(S): 10 TABLET, FILM COATED ORAL at 21:38

## 2018-07-17 RX ADMIN — METHADONE HYDROCHLORIDE 10 MILLIGRAM(S): 40 TABLET ORAL at 09:21

## 2018-07-17 RX ADMIN — METFORMIN HYDROCHLORIDE 500 MILLIGRAM(S): 850 TABLET ORAL at 11:22

## 2018-07-17 RX ADMIN — Medication 50 MILLIGRAM(S): at 06:16

## 2018-07-17 RX ADMIN — AMLODIPINE BESYLATE 2.5 MILLIGRAM(S): 2.5 TABLET ORAL at 21:37

## 2018-07-17 RX ADMIN — Medication 300 MILLIGRAM(S): at 17:50

## 2018-07-17 RX ADMIN — Medication 50 MILLIGRAM(S): at 21:36

## 2018-07-17 RX ADMIN — PANTOPRAZOLE SODIUM 40 MILLIGRAM(S): 20 TABLET, DELAYED RELEASE ORAL at 09:21

## 2018-07-17 RX ADMIN — Medication 100 MILLIGRAM(S): at 21:37

## 2018-07-17 RX ADMIN — Medication 1 TABLET(S): at 09:21

## 2018-07-17 RX ADMIN — METFORMIN HYDROCHLORIDE 500 MILLIGRAM(S): 850 TABLET ORAL at 09:20

## 2018-07-17 RX ADMIN — Medication 50 MILLIGRAM(S): at 14:09

## 2018-07-18 RX ADMIN — Medication 100 MILLIGRAM(S): at 08:49

## 2018-07-18 RX ADMIN — Medication 50 MILLIGRAM(S): at 14:09

## 2018-07-18 RX ADMIN — Medication 650 MILLIGRAM(S): at 17:10

## 2018-07-18 RX ADMIN — Medication 30 MILLILITER(S): at 12:00

## 2018-07-18 RX ADMIN — ARIPIPRAZOLE 30 MILLIGRAM(S): 15 TABLET ORAL at 20:58

## 2018-07-18 RX ADMIN — METFORMIN HYDROCHLORIDE 500 MILLIGRAM(S): 850 TABLET ORAL at 15:55

## 2018-07-18 RX ADMIN — ESCITALOPRAM OXALATE 20 MILLIGRAM(S): 10 TABLET, FILM COATED ORAL at 20:58

## 2018-07-18 RX ADMIN — Medication 1 TABLET(S): at 08:49

## 2018-07-18 RX ADMIN — METFORMIN HYDROCHLORIDE 500 MILLIGRAM(S): 850 TABLET ORAL at 08:49

## 2018-07-18 RX ADMIN — METFORMIN HYDROCHLORIDE 500 MILLIGRAM(S): 850 TABLET ORAL at 11:07

## 2018-07-18 RX ADMIN — Medication 50 MILLIGRAM(S): at 06:13

## 2018-07-18 RX ADMIN — PANTOPRAZOLE SODIUM 40 MILLIGRAM(S): 20 TABLET, DELAYED RELEASE ORAL at 08:49

## 2018-07-18 RX ADMIN — Medication 100 MILLIGRAM(S): at 20:59

## 2018-07-18 RX ADMIN — Medication 50 MILLIGRAM(S): at 21:01

## 2018-07-18 RX ADMIN — AMLODIPINE BESYLATE 2.5 MILLIGRAM(S): 2.5 TABLET ORAL at 20:59

## 2018-07-18 NOTE — CHART NOTE - NSCHARTNOTEFT_GEN_A_CORE
Subsequent Inpatient Encounter                                       Detox Unit    LYNDON SMITH   27y   Female      Chief Complaint:    Follow up for Opiate  Dependency    HPI:     I reviewed previous notes. No Change, except if noted below.             Detail:_    ROS:   I reviewed with patient.  No changes from previous notes except if noted below.             Detail: _    PFSH I reviewed with patient. No changes from previous notes except if noted below.             Detail_    Medication reconciliation performed.    MEDICATIONS  (STANDING):  amLODIPine   Tablet 2.5 milliGRAM(s) Oral at bedtime  ARIPiprazole 30 milliGRAM(s) Oral at bedtime  escitalopram 20 milliGRAM(s) Oral at bedtime  metFORMIN 500 milliGRAM(s) Oral <User Schedule>  methadone    Tablet 5 milliGRAM(s) Oral every 12 hours  multivitamin 1 Tablet(s) Oral daily  pantoprazole    Tablet 40 milliGRAM(s) Oral before breakfast  propylthiouracil 50 milliGRAM(s) Oral every 8 hours  topiramate 100 milliGRAM(s) Oral two times a day      MEDICATIONS  (PRN):  acetaminophen   Tablet. 650 milliGRAM(s) Oral every 6 hours PRN Mild Pain (1 - 3)  aluminum hydroxide/magnesium hydroxide/simethicone Suspension 30 milliLiter(s) Oral every 4 hours PRN Dyspepsia  bismuth subsalicylate Liquid 30 milliLiter(s) Oral four times a day PRN Diarrhea  hydrOXYzine hydrochloride 50 milliGRAM(s) Oral four times a day PRN Anxiety  hydrOXYzine hydrochloride 50 milliGRAM(s) Oral at bedtime PRN insmonia  ibuprofen  Tablet 400 milliGRAM(s) Oral every 6 hours PRN Mild pain  trimethobenzamide 300 milliGRAM(s) Oral every 8 hours PRN Nausea and/or Vomiting      T(C): 36.2 (18 @ 06:00), Max: 36.8 (18 @ 12:00)  HR: 66 (18 @ 06:00) (66 - 85)  BP: 102/50 (18 @ 06:00) (102/50 - 122/58)  RR: 16 (18 @ 06:00) (16 - 18)  SpO2: --    PHYSICAL EXAM:      Constitutional: NAD, A&O x3    Eyes: PERRLA, no conjuctivitis    Neck: no lymphadenopathy    Respiratory: +air entry, no rales, no rhonchi, no wheezes    Cardiovascular: +S1 and S2, regular rate and rhythm    Gastrointestinal: +BS, soft, non-tender, not distended    Extremities:  no edema, no calf tenderness    Skin: no rashes, normal turgor                            12.2   9.90  )-----------( 290      ( 2018 12:29 )             37.0       142  |  106  |  10  ----------------------------<  112<H>  4.0   |  21  |  0.7    Ca    8.9      2018 12:29  Mg     2.1         TPro  7.3  /  Alb  3.5  /  TBili  0.2  /  DBili  x   /  AST  38  /  ALT  38  /  AlkPhos  72      Magnesium, Serum: 2.1 mg/dL (18 @ 12:29)  Hemoglobin A1C, Whole Blood: 7.0 % (18 @ 12:29)  Treponema Pallidum Antibody Interpretation: Negative (18 @ 12:29)  Hepatitis B Surface Antigen: Nonreact (18 @ 12:29)  Hepatitis C Virus S/CO Ratio: 12.02 S/CO (18 @ 12:29)    Hepatitis C Virus Interpretation: Reactive (18 @ 12:29)      Urinalysis Basic - ( 2018 13:23 )    Color: Yellow / Appearance: Clear / S.015 / pH: x  Gluc: x / Ketone: Negative  / Bili: Negative / Urobili: 0.2 mg/dL   Blood: x / Protein: Negative mg/dL / Nitrite: Negative   Leuk Esterase: Negative / RBC: x / WBC x   Sq Epi: x / Non Sq Epi: x / Bacteria: x          Impression and Plan:    Primary Diagnosis:  Opiate Dependency                                Medication: Methadone Protocol    Secondary Diagnosis:   DM, HTN                                      Medication: stable on meds    Tertiary Diagnosis:       Anxiety/depression                        Medication  stable      Continue Detox Protocols. Use of PRNS as needed for withdrawal and comfort.    Adjustments to protocols:    Labs/ Tests reviewed.    Tests ordered:     Likely Disposition: _x__Home       ___Rehab       ___Outpatient Program    ___Self Help     _____Other    Estimated Length of stay:__3__

## 2018-07-19 ENCOUNTER — INPATIENT (INPATIENT)
Facility: HOSPITAL | Age: 28
LOS: 11 days | Discharge: HOME | End: 2018-07-31
Attending: INTERNAL MEDICINE | Admitting: INTERNAL MEDICINE

## 2018-07-19 VITALS
SYSTOLIC BLOOD PRESSURE: 100 MMHG | DIASTOLIC BLOOD PRESSURE: 52 MMHG | TEMPERATURE: 98 F | RESPIRATION RATE: 16 BRPM | HEART RATE: 84 BPM

## 2018-07-19 VITALS
RESPIRATION RATE: 18 BRPM | TEMPERATURE: 97 F | WEIGHT: 293 LBS | DIASTOLIC BLOOD PRESSURE: 69 MMHG | HEIGHT: 67 IN | SYSTOLIC BLOOD PRESSURE: 115 MMHG | HEART RATE: 67 BPM

## 2018-07-19 DIAGNOSIS — F11.20 OPIOID DEPENDENCE, UNCOMPLICATED: ICD-10-CM

## 2018-07-19 DIAGNOSIS — Z02.9 ENCOUNTER FOR ADMINISTRATIVE EXAMINATIONS, UNSPECIFIED: ICD-10-CM

## 2018-07-19 PROBLEM — E05.90 THYROTOXICOSIS, UNSPECIFIED WITHOUT THYROTOXIC CRISIS OR STORM: Chronic | Status: ACTIVE | Noted: 2018-06-17

## 2018-07-19 PROBLEM — E28.2 POLYCYSTIC OVARIAN SYNDROME: Chronic | Status: ACTIVE | Noted: 2018-06-17

## 2018-07-19 PROBLEM — F32.9 MAJOR DEPRESSIVE DISORDER, SINGLE EPISODE, UNSPECIFIED: Chronic | Status: ACTIVE | Noted: 2018-06-17

## 2018-07-19 LAB
GAMMA INTERFERON BACKGROUND BLD IA-ACNC: 0.02 IU/ML — SIGNIFICANT CHANGE UP
M TB IFN-G BLD-IMP: NEGATIVE — SIGNIFICANT CHANGE UP
M TB IFN-G CD4+ BCKGRND COR BLD-ACNC: 0 IU/ML — SIGNIFICANT CHANGE UP
M TB IFN-G CD4+CD8+ BCKGRND COR BLD-ACNC: 0 IU/ML — SIGNIFICANT CHANGE UP
QUANT TB PLUS MITOGEN MINUS NIL: 9.47 IU/ML — SIGNIFICANT CHANGE UP

## 2018-07-19 RX ORDER — PROPYLTHIOURACIL 50 MG
50 TABLET ORAL EVERY 8 HOURS
Qty: 0 | Refills: 0 | Status: DISCONTINUED | OUTPATIENT
Start: 2018-07-19 | End: 2018-07-31

## 2018-07-19 RX ORDER — TOPIRAMATE 25 MG
100 TABLET ORAL
Qty: 0 | Refills: 0 | Status: DISCONTINUED | OUTPATIENT
Start: 2018-07-19 | End: 2018-07-31

## 2018-07-19 RX ORDER — HYDROXYZINE HCL 10 MG
100 TABLET ORAL AT BEDTIME
Qty: 0 | Refills: 0 | Status: DISCONTINUED | OUTPATIENT
Start: 2018-07-19 | End: 2018-07-31

## 2018-07-19 RX ORDER — ACETAMINOPHEN 500 MG
650 TABLET ORAL EVERY 8 HOURS
Qty: 0 | Refills: 0 | Status: DISCONTINUED | OUTPATIENT
Start: 2018-07-19 | End: 2018-07-31

## 2018-07-19 RX ORDER — ESCITALOPRAM OXALATE 10 MG/1
20 TABLET, FILM COATED ORAL DAILY
Qty: 0 | Refills: 0 | Status: DISCONTINUED | OUTPATIENT
Start: 2018-07-19 | End: 2018-07-19

## 2018-07-19 RX ORDER — PANTOPRAZOLE SODIUM 20 MG/1
40 TABLET, DELAYED RELEASE ORAL
Qty: 0 | Refills: 0 | Status: DISCONTINUED | OUTPATIENT
Start: 2018-07-19 | End: 2018-07-31

## 2018-07-19 RX ORDER — ARIPIPRAZOLE 15 MG/1
30 TABLET ORAL DAILY
Qty: 0 | Refills: 0 | Status: DISCONTINUED | OUTPATIENT
Start: 2018-07-19 | End: 2018-07-19

## 2018-07-19 RX ORDER — ARIPIPRAZOLE 15 MG/1
30 TABLET ORAL AT BEDTIME
Qty: 0 | Refills: 0 | Status: DISCONTINUED | OUTPATIENT
Start: 2018-07-19 | End: 2018-07-31

## 2018-07-19 RX ORDER — METFORMIN HYDROCHLORIDE 850 MG/1
500 TABLET ORAL THREE TIMES A DAY
Qty: 0 | Refills: 0 | Status: DISCONTINUED | OUTPATIENT
Start: 2018-07-19 | End: 2018-07-31

## 2018-07-19 RX ORDER — HYDROXYZINE HCL 10 MG
50 TABLET ORAL EVERY 6 HOURS
Qty: 0 | Refills: 0 | Status: DISCONTINUED | OUTPATIENT
Start: 2018-07-19 | End: 2018-07-31

## 2018-07-19 RX ORDER — ESCITALOPRAM OXALATE 10 MG/1
20 TABLET, FILM COATED ORAL AT BEDTIME
Qty: 0 | Refills: 0 | Status: DISCONTINUED | OUTPATIENT
Start: 2018-07-19 | End: 2018-07-31

## 2018-07-19 RX ORDER — AMLODIPINE BESYLATE 2.5 MG/1
2.5 TABLET ORAL AT BEDTIME
Qty: 0 | Refills: 0 | Status: DISCONTINUED | OUTPATIENT
Start: 2018-07-19 | End: 2018-07-31

## 2018-07-19 RX ADMIN — ARIPIPRAZOLE 30 MILLIGRAM(S): 15 TABLET ORAL at 21:48

## 2018-07-19 RX ADMIN — METFORMIN HYDROCHLORIDE 500 MILLIGRAM(S): 850 TABLET ORAL at 20:56

## 2018-07-19 RX ADMIN — Medication 100 MILLIGRAM(S): at 08:13

## 2018-07-19 RX ADMIN — METFORMIN HYDROCHLORIDE 500 MILLIGRAM(S): 850 TABLET ORAL at 08:13

## 2018-07-19 RX ADMIN — Medication 100 MILLIGRAM(S): at 20:56

## 2018-07-19 RX ADMIN — Medication 1 TABLET(S): at 08:13

## 2018-07-19 RX ADMIN — Medication 50 MILLIGRAM(S): at 21:48

## 2018-07-19 RX ADMIN — Medication 50 MILLIGRAM(S): at 20:56

## 2018-07-19 RX ADMIN — AMLODIPINE BESYLATE 2.5 MILLIGRAM(S): 2.5 TABLET ORAL at 21:03

## 2018-07-19 RX ADMIN — Medication 50 MILLIGRAM(S): at 06:54

## 2018-07-19 RX ADMIN — PANTOPRAZOLE SODIUM 40 MILLIGRAM(S): 20 TABLET, DELAYED RELEASE ORAL at 08:13

## 2018-07-19 RX ADMIN — ESCITALOPRAM OXALATE 20 MILLIGRAM(S): 10 TABLET, FILM COATED ORAL at 21:49

## 2018-07-19 NOTE — CHART NOTE - NSCHARTNOTEFT_GEN_A_CORE
The patient was admitted to the inpt detox unit CDU, for   ETOH__x__ Opioid_x__  Benzo____Polysubstance _____ Dependency.    Pt was admitted from ED____, Intake__x__, Med/surg Floor_______.    Details are present in the preceding History & Physical section and follow up chart notes.  patient was evaluated on daily detox team  rounds.  Withdrawal symptoms and signs were reviewed on a daily basis, and the protocols were adjusted accordingly.    Labs and imaging results were reviewed and discussed with the patient.    All questions from the patient were addressed.  The patient was seen by the Chemical dependency counselors, and different options for after care were discussed.  The patient attended groups, meetings and 1:1 sessions with the counselors.  Narcane Kit was offered and instructions given prior to discharge.    Psychiatry consultation reviewed______, N/A__x____    Physical therapy evaluation reviewed_____, N/A_x___    Pt was given copies of labs and imaging reports, if applicable.    Prescriptions if needed, were sent through [facility] system to the pharmacy amnd are noted in the discharge instruction sheet.    After care was arranged by counselors and pt was discharged to:    Home___, Outpt. Program___, Rehab _x__, Long term____ [facility] ____ IPP____ SNF____, AMA___, Admin Discharge____    Principal Diagnosis: Alcohol Dependency___x Opioid Dependency_x__ Benzo Dependency____ Polysubstance Dependency____

## 2018-07-20 RX ADMIN — Medication 50 MILLIGRAM(S): at 21:12

## 2018-07-20 RX ADMIN — METFORMIN HYDROCHLORIDE 500 MILLIGRAM(S): 850 TABLET ORAL at 08:05

## 2018-07-20 RX ADMIN — METFORMIN HYDROCHLORIDE 500 MILLIGRAM(S): 850 TABLET ORAL at 11:59

## 2018-07-20 RX ADMIN — Medication 50 MILLIGRAM(S): at 11:59

## 2018-07-20 RX ADMIN — Medication 100 MILLIGRAM(S): at 20:46

## 2018-07-20 RX ADMIN — ARIPIPRAZOLE 30 MILLIGRAM(S): 15 TABLET ORAL at 20:46

## 2018-07-20 RX ADMIN — ESCITALOPRAM OXALATE 20 MILLIGRAM(S): 10 TABLET, FILM COATED ORAL at 20:46

## 2018-07-20 RX ADMIN — Medication 100 MILLIGRAM(S): at 08:05

## 2018-07-20 RX ADMIN — METFORMIN HYDROCHLORIDE 500 MILLIGRAM(S): 850 TABLET ORAL at 20:47

## 2018-07-20 RX ADMIN — Medication 50 MILLIGRAM(S): at 05:38

## 2018-07-20 RX ADMIN — PANTOPRAZOLE SODIUM 40 MILLIGRAM(S): 20 TABLET, DELAYED RELEASE ORAL at 08:06

## 2018-07-20 RX ADMIN — AMLODIPINE BESYLATE 2.5 MILLIGRAM(S): 2.5 TABLET ORAL at 20:46

## 2018-07-21 RX ADMIN — METFORMIN HYDROCHLORIDE 500 MILLIGRAM(S): 850 TABLET ORAL at 21:15

## 2018-07-21 RX ADMIN — AMLODIPINE BESYLATE 2.5 MILLIGRAM(S): 2.5 TABLET ORAL at 21:15

## 2018-07-21 RX ADMIN — Medication 30 MILLILITER(S): at 23:57

## 2018-07-21 RX ADMIN — Medication 50 MILLIGRAM(S): at 05:41

## 2018-07-21 RX ADMIN — ESCITALOPRAM OXALATE 20 MILLIGRAM(S): 10 TABLET, FILM COATED ORAL at 21:15

## 2018-07-21 RX ADMIN — METFORMIN HYDROCHLORIDE 500 MILLIGRAM(S): 850 TABLET ORAL at 08:35

## 2018-07-21 RX ADMIN — ARIPIPRAZOLE 30 MILLIGRAM(S): 15 TABLET ORAL at 21:15

## 2018-07-21 RX ADMIN — Medication 100 MILLIGRAM(S): at 08:35

## 2018-07-21 RX ADMIN — Medication 100 MILLIGRAM(S): at 21:15

## 2018-07-21 RX ADMIN — Medication 50 MILLIGRAM(S): at 12:13

## 2018-07-21 RX ADMIN — PANTOPRAZOLE SODIUM 40 MILLIGRAM(S): 20 TABLET, DELAYED RELEASE ORAL at 08:35

## 2018-07-21 RX ADMIN — METFORMIN HYDROCHLORIDE 500 MILLIGRAM(S): 850 TABLET ORAL at 12:11

## 2018-07-21 RX ADMIN — Medication 50 MILLIGRAM(S): at 12:11

## 2018-07-21 RX ADMIN — Medication 50 MILLIGRAM(S): at 21:15

## 2018-07-22 RX ADMIN — METFORMIN HYDROCHLORIDE 500 MILLIGRAM(S): 850 TABLET ORAL at 20:45

## 2018-07-22 RX ADMIN — METFORMIN HYDROCHLORIDE 500 MILLIGRAM(S): 850 TABLET ORAL at 12:17

## 2018-07-22 RX ADMIN — Medication 100 MILLIGRAM(S): at 20:45

## 2018-07-22 RX ADMIN — Medication 100 MILLIGRAM(S): at 09:01

## 2018-07-22 RX ADMIN — AMLODIPINE BESYLATE 2.5 MILLIGRAM(S): 2.5 TABLET ORAL at 20:44

## 2018-07-22 RX ADMIN — Medication 50 MILLIGRAM(S): at 06:43

## 2018-07-22 RX ADMIN — Medication 50 MILLIGRAM(S): at 20:44

## 2018-07-22 RX ADMIN — PANTOPRAZOLE SODIUM 40 MILLIGRAM(S): 20 TABLET, DELAYED RELEASE ORAL at 09:01

## 2018-07-22 RX ADMIN — METFORMIN HYDROCHLORIDE 500 MILLIGRAM(S): 850 TABLET ORAL at 09:01

## 2018-07-22 RX ADMIN — ARIPIPRAZOLE 30 MILLIGRAM(S): 15 TABLET ORAL at 20:44

## 2018-07-22 RX ADMIN — Medication 50 MILLIGRAM(S): at 12:17

## 2018-07-22 RX ADMIN — ESCITALOPRAM OXALATE 20 MILLIGRAM(S): 10 TABLET, FILM COATED ORAL at 20:45

## 2018-07-22 RX ADMIN — Medication 30 MILLILITER(S): at 09:02

## 2018-07-23 RX ADMIN — Medication 100 MILLIGRAM(S): at 08:17

## 2018-07-23 RX ADMIN — Medication 50 MILLIGRAM(S): at 06:26

## 2018-07-23 RX ADMIN — METFORMIN HYDROCHLORIDE 500 MILLIGRAM(S): 850 TABLET ORAL at 12:25

## 2018-07-23 RX ADMIN — ARIPIPRAZOLE 30 MILLIGRAM(S): 15 TABLET ORAL at 21:00

## 2018-07-23 RX ADMIN — Medication 100 MILLIGRAM(S): at 21:00

## 2018-07-23 RX ADMIN — METFORMIN HYDROCHLORIDE 500 MILLIGRAM(S): 850 TABLET ORAL at 21:00

## 2018-07-23 RX ADMIN — METFORMIN HYDROCHLORIDE 500 MILLIGRAM(S): 850 TABLET ORAL at 08:17

## 2018-07-23 RX ADMIN — AMLODIPINE BESYLATE 2.5 MILLIGRAM(S): 2.5 TABLET ORAL at 21:00

## 2018-07-23 RX ADMIN — Medication 50 MILLIGRAM(S): at 19:00

## 2018-07-23 RX ADMIN — Medication 50 MILLIGRAM(S): at 21:00

## 2018-07-23 RX ADMIN — Medication 50 MILLIGRAM(S): at 12:25

## 2018-07-23 RX ADMIN — PANTOPRAZOLE SODIUM 40 MILLIGRAM(S): 20 TABLET, DELAYED RELEASE ORAL at 08:17

## 2018-07-23 RX ADMIN — ESCITALOPRAM OXALATE 20 MILLIGRAM(S): 10 TABLET, FILM COATED ORAL at 21:00

## 2018-07-24 DIAGNOSIS — F14.20 COCAINE DEPENDENCE, UNCOMPLICATED: ICD-10-CM

## 2018-07-24 DIAGNOSIS — F15.20 OTHER STIMULANT DEPENDENCE, UNCOMPLICATED: ICD-10-CM

## 2018-07-24 DIAGNOSIS — F31.30 BIPOLAR DISORDER, CURRENT EPISODE DEPRESSED, MILD OR MODERATE SEVERITY, UNSPECIFIED: ICD-10-CM

## 2018-07-24 RX ORDER — DIPHENHYDRAMINE HCL 50 MG
50 CAPSULE ORAL AT BEDTIME
Qty: 0 | Refills: 0 | Status: DISCONTINUED | OUTPATIENT
Start: 2018-07-24 | End: 2018-07-31

## 2018-07-24 RX ORDER — LANOLIN ALCOHOL/MO/W.PET/CERES
10 CREAM (GRAM) TOPICAL AT BEDTIME
Qty: 0 | Refills: 0 | Status: DISCONTINUED | OUTPATIENT
Start: 2018-07-24 | End: 2018-07-31

## 2018-07-24 RX ADMIN — Medication 50 MILLIGRAM(S): at 20:40

## 2018-07-24 RX ADMIN — METFORMIN HYDROCHLORIDE 500 MILLIGRAM(S): 850 TABLET ORAL at 20:40

## 2018-07-24 RX ADMIN — METFORMIN HYDROCHLORIDE 500 MILLIGRAM(S): 850 TABLET ORAL at 08:03

## 2018-07-24 RX ADMIN — Medication 50 MILLIGRAM(S): at 05:40

## 2018-07-24 RX ADMIN — ARIPIPRAZOLE 30 MILLIGRAM(S): 15 TABLET ORAL at 20:40

## 2018-07-24 RX ADMIN — Medication 30 MILLILITER(S): at 21:14

## 2018-07-24 RX ADMIN — Medication 50 MILLIGRAM(S): at 12:03

## 2018-07-24 RX ADMIN — PANTOPRAZOLE SODIUM 40 MILLIGRAM(S): 20 TABLET, DELAYED RELEASE ORAL at 08:03

## 2018-07-24 RX ADMIN — Medication 100 MILLIGRAM(S): at 08:03

## 2018-07-24 RX ADMIN — ESCITALOPRAM OXALATE 20 MILLIGRAM(S): 10 TABLET, FILM COATED ORAL at 20:40

## 2018-07-24 RX ADMIN — Medication 100 MILLIGRAM(S): at 20:40

## 2018-07-24 RX ADMIN — METFORMIN HYDROCHLORIDE 500 MILLIGRAM(S): 850 TABLET ORAL at 12:03

## 2018-07-24 RX ADMIN — Medication 10 MILLIGRAM(S): at 20:40

## 2018-07-24 RX ADMIN — AMLODIPINE BESYLATE 2.5 MILLIGRAM(S): 2.5 TABLET ORAL at 20:40

## 2018-07-24 NOTE — BEHAVIORAL HEALTH ASSESSMENT NOTE - SUMMARY
26 yo SWF w ho bpad, seems to meet criteria by history, most recently depressed, w cocaine, crystal meth, opiate use disorder. Pt is in treatment at [facility] and feels engaged in this treatment and psych regimen of abilify and lexapro which she feels is helping to keep her more even and focused.   Will address insomnia with melatonin and benadryl
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
## 2018-07-24 NOTE — BEHAVIORAL HEALTH ASSESSMENT NOTE - NSBHSOCIALHXDETAILSFT_PSY_A_CORE
Pt is oldest in sibship of 3 w 1 half sibling paternal  Disability since age 20 for dx of bpad  Pt worked for 1 yr at [employer] x 1 yr, interrupted by psych hospitalization  Never been , relationship x 8 yrs, partner w substance use, now 8 mos sober living in shelter house  No children Pt is oldest in sibship of 3 w 1 half sibling paternal  Disability since age 20 for dx of bpad  Pt worked for 1 yr at [employer] x 1 yr, interrupted by psych hospitalization  Never been , relationship x 8 yrs, partner w substance use, now 8 mos sober living in snf house  Father is POA  No children

## 2018-07-24 NOTE — BEHAVIORAL HEALTH ASSESSMENT NOTE - DETAILS
took OD of tylenol "3 bottles worth" at age 20 in setting of homelessness mother w bpad, mother attempted suicide when pt was a baby younger sister age 18 w mj use
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
## 2018-07-25 DIAGNOSIS — E11.9 TYPE 2 DIABETES MELLITUS WITHOUT COMPLICATIONS: ICD-10-CM

## 2018-07-25 DIAGNOSIS — F11.29 OPIOID DEPENDENCE WITH UNSPECIFIED OPIOID-INDUCED DISORDER: ICD-10-CM

## 2018-07-25 DIAGNOSIS — F32.9 MAJOR DEPRESSIVE DISORDER, SINGLE EPISODE, UNSPECIFIED: ICD-10-CM

## 2018-07-25 DIAGNOSIS — E28.2 POLYCYSTIC OVARIAN SYNDROME: ICD-10-CM

## 2018-07-25 DIAGNOSIS — E05.90 THYROTOXICOSIS, UNSPECIFIED WITHOUT THYROTOXIC CRISIS OR STORM: ICD-10-CM

## 2018-07-25 DIAGNOSIS — F13.19 SEDATIVE, HYPNOTIC OR ANXIOLYTIC ABUSE WITH UNSPECIFIED SEDATIVE, HYPNOTIC OR ANXIOLYTIC-INDUCED DISORDER: ICD-10-CM

## 2018-07-25 DIAGNOSIS — F41.9 ANXIETY DISORDER, UNSPECIFIED: ICD-10-CM

## 2018-07-25 DIAGNOSIS — B19.21 UNSPECIFIED VIRAL HEPATITIS C WITH HEPATIC COMA: ICD-10-CM

## 2018-07-25 DIAGNOSIS — F14.19 COCAINE ABUSE WITH UNSPECIFIED COCAINE-INDUCED DISORDER: ICD-10-CM

## 2018-07-25 DIAGNOSIS — I10 ESSENTIAL (PRIMARY) HYPERTENSION: ICD-10-CM

## 2018-07-25 RX ADMIN — METFORMIN HYDROCHLORIDE 500 MILLIGRAM(S): 850 TABLET ORAL at 20:48

## 2018-07-25 RX ADMIN — Medication 100 MILLIGRAM(S): at 08:08

## 2018-07-25 RX ADMIN — ESCITALOPRAM OXALATE 20 MILLIGRAM(S): 10 TABLET, FILM COATED ORAL at 20:49

## 2018-07-25 RX ADMIN — Medication 50 MILLIGRAM(S): at 12:02

## 2018-07-25 RX ADMIN — Medication 50 MILLIGRAM(S): at 20:48

## 2018-07-25 RX ADMIN — Medication 100 MILLIGRAM(S): at 20:48

## 2018-07-25 RX ADMIN — METFORMIN HYDROCHLORIDE 500 MILLIGRAM(S): 850 TABLET ORAL at 08:08

## 2018-07-25 RX ADMIN — METFORMIN HYDROCHLORIDE 500 MILLIGRAM(S): 850 TABLET ORAL at 12:02

## 2018-07-25 RX ADMIN — Medication 50 MILLIGRAM(S): at 05:20

## 2018-07-25 RX ADMIN — PANTOPRAZOLE SODIUM 40 MILLIGRAM(S): 20 TABLET, DELAYED RELEASE ORAL at 08:08

## 2018-07-25 RX ADMIN — ARIPIPRAZOLE 30 MILLIGRAM(S): 15 TABLET ORAL at 20:48

## 2018-07-25 RX ADMIN — AMLODIPINE BESYLATE 2.5 MILLIGRAM(S): 2.5 TABLET ORAL at 20:55

## 2018-07-25 RX ADMIN — Medication 10 MILLIGRAM(S): at 20:48

## 2018-07-26 RX ADMIN — Medication 10 MILLIGRAM(S): at 20:49

## 2018-07-26 RX ADMIN — Medication 100 MILLIGRAM(S): at 20:49

## 2018-07-26 RX ADMIN — METFORMIN HYDROCHLORIDE 500 MILLIGRAM(S): 850 TABLET ORAL at 20:49

## 2018-07-26 RX ADMIN — ESCITALOPRAM OXALATE 20 MILLIGRAM(S): 10 TABLET, FILM COATED ORAL at 20:49

## 2018-07-26 RX ADMIN — Medication 50 MILLIGRAM(S): at 20:49

## 2018-07-26 RX ADMIN — Medication 100 MILLIGRAM(S): at 09:44

## 2018-07-26 RX ADMIN — Medication 50 MILLIGRAM(S): at 05:21

## 2018-07-26 RX ADMIN — METFORMIN HYDROCHLORIDE 500 MILLIGRAM(S): 850 TABLET ORAL at 12:05

## 2018-07-26 RX ADMIN — PANTOPRAZOLE SODIUM 40 MILLIGRAM(S): 20 TABLET, DELAYED RELEASE ORAL at 09:44

## 2018-07-26 RX ADMIN — ARIPIPRAZOLE 30 MILLIGRAM(S): 15 TABLET ORAL at 20:49

## 2018-07-26 RX ADMIN — Medication 50 MILLIGRAM(S): at 12:08

## 2018-07-26 RX ADMIN — AMLODIPINE BESYLATE 2.5 MILLIGRAM(S): 2.5 TABLET ORAL at 20:49

## 2018-07-26 RX ADMIN — METFORMIN HYDROCHLORIDE 500 MILLIGRAM(S): 850 TABLET ORAL at 09:44

## 2018-07-26 RX ADMIN — Medication 50 MILLIGRAM(S): at 14:14

## 2018-07-27 RX ADMIN — ARIPIPRAZOLE 30 MILLIGRAM(S): 15 TABLET ORAL at 20:36

## 2018-07-27 RX ADMIN — METFORMIN HYDROCHLORIDE 500 MILLIGRAM(S): 850 TABLET ORAL at 20:37

## 2018-07-27 RX ADMIN — METFORMIN HYDROCHLORIDE 500 MILLIGRAM(S): 850 TABLET ORAL at 08:16

## 2018-07-27 RX ADMIN — ESCITALOPRAM OXALATE 20 MILLIGRAM(S): 10 TABLET, FILM COATED ORAL at 20:36

## 2018-07-27 RX ADMIN — PANTOPRAZOLE SODIUM 40 MILLIGRAM(S): 20 TABLET, DELAYED RELEASE ORAL at 08:16

## 2018-07-27 RX ADMIN — Medication 50 MILLIGRAM(S): at 20:36

## 2018-07-27 RX ADMIN — Medication 50 MILLIGRAM(S): at 06:14

## 2018-07-27 RX ADMIN — Medication 10 MILLIGRAM(S): at 20:36

## 2018-07-27 RX ADMIN — AMLODIPINE BESYLATE 2.5 MILLIGRAM(S): 2.5 TABLET ORAL at 20:36

## 2018-07-27 RX ADMIN — METFORMIN HYDROCHLORIDE 500 MILLIGRAM(S): 850 TABLET ORAL at 12:09

## 2018-07-27 RX ADMIN — Medication 50 MILLIGRAM(S): at 15:06

## 2018-07-27 RX ADMIN — Medication 100 MILLIGRAM(S): at 20:37

## 2018-07-27 RX ADMIN — Medication 100 MILLIGRAM(S): at 08:16

## 2018-07-27 RX ADMIN — Medication 50 MILLIGRAM(S): at 20:39

## 2018-07-27 RX ADMIN — Medication 50 MILLIGRAM(S): at 12:09

## 2018-07-28 RX ADMIN — Medication 100 MILLIGRAM(S): at 20:56

## 2018-07-28 RX ADMIN — Medication 100 MILLIGRAM(S): at 08:16

## 2018-07-28 RX ADMIN — ARIPIPRAZOLE 30 MILLIGRAM(S): 15 TABLET ORAL at 20:56

## 2018-07-28 RX ADMIN — Medication 50 MILLIGRAM(S): at 20:56

## 2018-07-28 RX ADMIN — Medication 50 MILLIGRAM(S): at 14:08

## 2018-07-28 RX ADMIN — METFORMIN HYDROCHLORIDE 500 MILLIGRAM(S): 850 TABLET ORAL at 08:16

## 2018-07-28 RX ADMIN — ESCITALOPRAM OXALATE 20 MILLIGRAM(S): 10 TABLET, FILM COATED ORAL at 20:56

## 2018-07-28 RX ADMIN — Medication 50 MILLIGRAM(S): at 05:58

## 2018-07-28 RX ADMIN — PANTOPRAZOLE SODIUM 40 MILLIGRAM(S): 20 TABLET, DELAYED RELEASE ORAL at 08:16

## 2018-07-28 RX ADMIN — Medication 650 MILLIGRAM(S): at 08:16

## 2018-07-28 RX ADMIN — METFORMIN HYDROCHLORIDE 500 MILLIGRAM(S): 850 TABLET ORAL at 20:56

## 2018-07-28 RX ADMIN — Medication 50 MILLIGRAM(S): at 21:28

## 2018-07-28 RX ADMIN — METFORMIN HYDROCHLORIDE 500 MILLIGRAM(S): 850 TABLET ORAL at 14:08

## 2018-07-28 RX ADMIN — AMLODIPINE BESYLATE 2.5 MILLIGRAM(S): 2.5 TABLET ORAL at 20:56

## 2018-07-28 RX ADMIN — Medication 10 MILLIGRAM(S): at 20:56

## 2018-07-29 RX ADMIN — Medication 50 MILLIGRAM(S): at 21:05

## 2018-07-29 RX ADMIN — Medication 50 MILLIGRAM(S): at 14:20

## 2018-07-29 RX ADMIN — Medication 100 MILLIGRAM(S): at 21:05

## 2018-07-29 RX ADMIN — METFORMIN HYDROCHLORIDE 500 MILLIGRAM(S): 850 TABLET ORAL at 09:51

## 2018-07-29 RX ADMIN — Medication 100 MILLIGRAM(S): at 09:51

## 2018-07-29 RX ADMIN — ARIPIPRAZOLE 30 MILLIGRAM(S): 15 TABLET ORAL at 21:04

## 2018-07-29 RX ADMIN — METFORMIN HYDROCHLORIDE 500 MILLIGRAM(S): 850 TABLET ORAL at 21:04

## 2018-07-29 RX ADMIN — AMLODIPINE BESYLATE 2.5 MILLIGRAM(S): 2.5 TABLET ORAL at 21:05

## 2018-07-29 RX ADMIN — Medication 650 MILLIGRAM(S): at 14:19

## 2018-07-29 RX ADMIN — METFORMIN HYDROCHLORIDE 500 MILLIGRAM(S): 850 TABLET ORAL at 14:20

## 2018-07-29 RX ADMIN — PANTOPRAZOLE SODIUM 40 MILLIGRAM(S): 20 TABLET, DELAYED RELEASE ORAL at 09:50

## 2018-07-29 RX ADMIN — Medication 50 MILLIGRAM(S): at 05:29

## 2018-07-29 RX ADMIN — ESCITALOPRAM OXALATE 20 MILLIGRAM(S): 10 TABLET, FILM COATED ORAL at 21:04

## 2018-07-29 RX ADMIN — Medication 10 MILLIGRAM(S): at 21:04

## 2018-07-30 RX ADMIN — METFORMIN HYDROCHLORIDE 500 MILLIGRAM(S): 850 TABLET ORAL at 20:58

## 2018-07-30 RX ADMIN — Medication 100 MILLIGRAM(S): at 20:58

## 2018-07-30 RX ADMIN — PANTOPRAZOLE SODIUM 40 MILLIGRAM(S): 20 TABLET, DELAYED RELEASE ORAL at 08:27

## 2018-07-30 RX ADMIN — Medication 100 MILLIGRAM(S): at 08:27

## 2018-07-30 RX ADMIN — AMLODIPINE BESYLATE 2.5 MILLIGRAM(S): 2.5 TABLET ORAL at 20:58

## 2018-07-30 RX ADMIN — METFORMIN HYDROCHLORIDE 500 MILLIGRAM(S): 850 TABLET ORAL at 08:27

## 2018-07-30 RX ADMIN — Medication 50 MILLIGRAM(S): at 20:58

## 2018-07-30 RX ADMIN — METFORMIN HYDROCHLORIDE 500 MILLIGRAM(S): 850 TABLET ORAL at 12:17

## 2018-07-30 RX ADMIN — ARIPIPRAZOLE 30 MILLIGRAM(S): 15 TABLET ORAL at 20:58

## 2018-07-30 RX ADMIN — Medication 10 MILLIGRAM(S): at 20:58

## 2018-07-30 RX ADMIN — Medication 50 MILLIGRAM(S): at 05:15

## 2018-07-30 RX ADMIN — ESCITALOPRAM OXALATE 20 MILLIGRAM(S): 10 TABLET, FILM COATED ORAL at 20:58

## 2018-07-30 RX ADMIN — Medication 50 MILLIGRAM(S): at 12:17

## 2018-07-31 VITALS — HEART RATE: 130 BPM | SYSTOLIC BLOOD PRESSURE: 116 MMHG | TEMPERATURE: 98 F | DIASTOLIC BLOOD PRESSURE: 70 MMHG

## 2018-07-31 RX ORDER — PROPYLTHIOURACIL 50 MG
1 TABLET ORAL
Qty: 0 | Refills: 0 | COMMUNITY
Start: 2018-07-31

## 2018-07-31 RX ORDER — ESCITALOPRAM OXALATE 10 MG/1
1 TABLET, FILM COATED ORAL
Qty: 0 | Refills: 0 | COMMUNITY

## 2018-07-31 RX ORDER — ESCITALOPRAM OXALATE 10 MG/1
1 TABLET, FILM COATED ORAL
Qty: 0 | Refills: 0 | COMMUNITY
Start: 2018-07-31

## 2018-07-31 RX ORDER — HYDROXYZINE HCL 10 MG
1 TABLET ORAL
Qty: 0 | Refills: 0 | COMMUNITY

## 2018-07-31 RX ORDER — AMLODIPINE BESYLATE 2.5 MG/1
1 TABLET ORAL
Qty: 0 | Refills: 0 | COMMUNITY

## 2018-07-31 RX ORDER — HYDROXYZINE HCL 10 MG
2 TABLET ORAL
Qty: 0 | Refills: 0 | COMMUNITY
Start: 2018-07-31

## 2018-07-31 RX ORDER — AMLODIPINE BESYLATE 2.5 MG/1
1 TABLET ORAL
Qty: 0 | Refills: 0 | COMMUNITY
Start: 2018-07-31

## 2018-07-31 RX ORDER — HYDROXYZINE HCL 10 MG
1 TABLET ORAL
Qty: 0 | Refills: 0 | COMMUNITY
Start: 2018-07-31

## 2018-07-31 RX ORDER — METFORMIN HYDROCHLORIDE 850 MG/1
1 TABLET ORAL
Qty: 0 | Refills: 0 | COMMUNITY
Start: 2018-07-31

## 2018-07-31 RX ORDER — PROPYLTHIOURACIL 50 MG
1 TABLET ORAL
Qty: 0 | Refills: 0 | COMMUNITY

## 2018-07-31 RX ORDER — ARIPIPRAZOLE 15 MG/1
30 TABLET ORAL
Qty: 0 | Refills: 0 | COMMUNITY

## 2018-07-31 RX ORDER — METFORMIN HYDROCHLORIDE 850 MG/1
1 TABLET ORAL
Qty: 0 | Refills: 0 | COMMUNITY

## 2018-07-31 RX ORDER — ARIPIPRAZOLE 15 MG/1
1 TABLET ORAL
Qty: 0 | Refills: 0 | COMMUNITY
Start: 2018-07-31

## 2018-07-31 RX ADMIN — PANTOPRAZOLE SODIUM 40 MILLIGRAM(S): 20 TABLET, DELAYED RELEASE ORAL at 08:29

## 2018-07-31 RX ADMIN — Medication 50 MILLIGRAM(S): at 05:57

## 2018-07-31 RX ADMIN — Medication 100 MILLIGRAM(S): at 08:29

## 2018-07-31 RX ADMIN — METFORMIN HYDROCHLORIDE 500 MILLIGRAM(S): 850 TABLET ORAL at 08:30

## 2018-07-31 NOTE — CHART NOTE - NSCHARTNOTEFT_GEN_A_CORE
Subsequent Inpatient Encounter                                       Detox Unit    LYNDON SMITH   27y   Female      Chief Complaint:    Follow up for Opiate  Dependency    HPI:     I reviewed previous notes. No Change, except if noted below.             Detail:_    ROS:   I reviewed with patient.  No changes from previous notes except if noted below.             Detail: _    PFSH I reviewed with patient. No changes from previous notes except if noted below.             Detail_    Medication reconciliation performed.    MEDICATIONS  (STANDING):  amLODIPine   Tablet 2.5 milliGRAM(s) Oral at bedtime  ARIPiprazole 30 milliGRAM(s) Oral at bedtime  escitalopram 20 milliGRAM(s) Oral at bedtime  metFORMIN 500 milliGRAM(s) Oral <User Schedule>  methadone    Tablet 5 milliGRAM(s) Oral every 12 hours  multivitamin 1 Tablet(s) Oral daily  pantoprazole    Tablet 40 milliGRAM(s) Oral before breakfast  propylthiouracil 50 milliGRAM(s) Oral every 8 hours  topiramate 100 milliGRAM(s) Oral two times a day      MEDICATIONS  (PRN):  acetaminophen   Tablet. 650 milliGRAM(s) Oral every 6 hours PRN Mild Pain (1 - 3)  aluminum hydroxide/magnesium hydroxide/simethicone Suspension 30 milliLiter(s) Oral every 4 hours PRN Dyspepsia  bismuth subsalicylate Liquid 30 milliLiter(s) Oral four times a day PRN Diarrhea  hydrOXYzine hydrochloride 50 milliGRAM(s) Oral four times a day PRN Anxiety  hydrOXYzine hydrochloride 50 milliGRAM(s) Oral at bedtime PRN insmonia  ibuprofen  Tablet 400 milliGRAM(s) Oral every 6 hours PRN Mild pain  trimethobenzamide 300 milliGRAM(s) Oral every 8 hours PRN Nausea and/or Vomiting      T(C): 36.2 (18 @ 06:00), Max: 36.8 (18 @ 12:00)  HR: 66 (18 @ 06:00) (66 - 85)  BP: 102/50 (18 @ 06:00) (102/50 - 122/58)  RR: 16 (18 @ 06:00) (16 - 18)  SpO2: --    PHYSICAL EXAM:      Constitutional: NAD, A&O x3    Eyes: PERRLA, no conjuctivitis    Neck: no lymphadenopathy    Respiratory: +air entry, no rales, no rhonchi, no wheezes    Cardiovascular: +S1 and S2, regular rate and rhythm    Gastrointestinal: +BS, soft, non-tender, not distended    Extremities:  no edema, no calf tenderness    Skin: no rashes, normal turgor                            12.2   9.90  )-----------( 290      ( 2018 12:29 )             37.0       142  |  106  |  10  ----------------------------<  112<H>  4.0   |  21  |  0.7    Ca    8.9      2018 12:29  Mg     2.1         TPro  7.3  /  Alb  3.5  /  TBili  0.2  /  DBili  x   /  AST  38  /  ALT  38  /  AlkPhos  72      Magnesium, Serum: 2.1 mg/dL (18 @ 12:29)  Hemoglobin A1C, Whole Blood: 7.0 % (18 @ 12:29)  Treponema Pallidum Antibody Interpretation: Negative (18 @ 12:29)  Hepatitis B Surface Antigen: Nonreact (18 @ 12:29)  Hepatitis C Virus S/CO Ratio: 12.02 S/CO (18 @ 12:29)    Hepatitis C Virus Interpretation: Reactive (18 @ 12:29)      Urinalysis Basic - ( 2018 13:23 )    Color: Yellow / Appearance: Clear / S.015 / pH: x  Gluc: x / Ketone: Negative  / Bili: Negative / Urobili: 0.2 mg/dL   Blood: x / Protein: Negative mg/dL / Nitrite: Negative   Leuk Esterase: Negative / RBC: x / WBC x   Sq Epi: x / Non Sq Epi: x / Bacteria: x          Impression and Plan:    Primary Diagnosis:  Opiate Dependency                                Medication: Methadone Protocol    Secondary Diagnosis:   DM, HTN                                      Medication: stable on meds    Tertiary Diagnosis:       Anxiety/depression                        Medication  stable      Continue Detox Protocols. Use of PRNS as needed for withdrawal and comfort.    Adjustments to protocols:    Labs/ Tests reviewed.    Tests ordered:     Likely Disposition: _x__Home       ___Rehab       ___Outpatient Program    ___Self Help     _____Other    Estimated Length of stay:__3__ Allergies:  BuSpar (Unknown)  clonidine (Unknown)  Lamictal (Unknown)  Nicotine Patch (Rash (Mild))      Diet: Regular    Activity: as tolerated    Follow up with    1. PMD in 2 weeks    2. Psych in 2 weeks    3.    Follow up for abnormal labs/tests    1.    Extra Instructions:      Flu Vaccine given  Yes_____         No______      Diagnosis:  Chemical Dependency   Maintain sobriety  refrain from all use      Patient Signature___________________________________________  Date_________________      Nurse Signature_____________________________________________Date_________________

## 2018-08-03 ENCOUNTER — EMERGENCY (EMERGENCY)
Facility: HOSPITAL | Age: 28
LOS: 0 days | Discharge: HOME | End: 2018-08-03
Attending: EMERGENCY MEDICINE | Admitting: EMERGENCY MEDICINE

## 2018-08-03 VITALS
SYSTOLIC BLOOD PRESSURE: 137 MMHG | OXYGEN SATURATION: 98 % | HEART RATE: 67 BPM | TEMPERATURE: 97 F | RESPIRATION RATE: 18 BRPM | DIASTOLIC BLOOD PRESSURE: 81 MMHG

## 2018-08-03 DIAGNOSIS — F17.210 NICOTINE DEPENDENCE, CIGARETTES, UNCOMPLICATED: ICD-10-CM

## 2018-08-03 DIAGNOSIS — F32.9 MAJOR DEPRESSIVE DISORDER, SINGLE EPISODE, UNSPECIFIED: ICD-10-CM

## 2018-08-03 DIAGNOSIS — Z87.42 PERSONAL HISTORY OF OTHER DISEASES OF THE FEMALE GENITAL TRACT: ICD-10-CM

## 2018-08-03 DIAGNOSIS — F14.10 COCAINE ABUSE, UNCOMPLICATED: ICD-10-CM

## 2018-08-03 DIAGNOSIS — F31.9 BIPOLAR DISORDER, UNSPECIFIED: ICD-10-CM

## 2018-08-03 DIAGNOSIS — E11.9 TYPE 2 DIABETES MELLITUS WITHOUT COMPLICATIONS: ICD-10-CM

## 2018-08-03 DIAGNOSIS — Z88.8 ALLERGY STATUS TO OTHER DRUGS, MEDICAMENTS AND BIOLOGICAL SUBSTANCES: ICD-10-CM

## 2018-08-03 DIAGNOSIS — F14.20 COCAINE DEPENDENCE, UNCOMPLICATED: ICD-10-CM

## 2018-08-03 DIAGNOSIS — F15.20 OTHER STIMULANT DEPENDENCE, UNCOMPLICATED: ICD-10-CM

## 2018-08-03 DIAGNOSIS — E66.9 OBESITY, UNSPECIFIED: ICD-10-CM

## 2018-08-03 DIAGNOSIS — F11.10 OPIOID ABUSE, UNCOMPLICATED: ICD-10-CM

## 2018-08-03 DIAGNOSIS — Z51.89 ENCOUNTER FOR OTHER SPECIFIED AFTERCARE: ICD-10-CM

## 2018-08-03 DIAGNOSIS — Z79.899 OTHER LONG TERM (CURRENT) DRUG THERAPY: ICD-10-CM

## 2018-08-03 DIAGNOSIS — Z76.0 ENCOUNTER FOR ISSUE OF REPEAT PRESCRIPTION: ICD-10-CM

## 2018-08-03 DIAGNOSIS — F11.20 OPIOID DEPENDENCE, UNCOMPLICATED: ICD-10-CM

## 2018-08-03 DIAGNOSIS — Z79.84 LONG TERM (CURRENT) USE OF ORAL HYPOGLYCEMIC DRUGS: ICD-10-CM

## 2018-08-03 NOTE — ED ADULT NURSE NOTE - NSSISCREENINGSIGNS_ED_A_ED
social withdrawal- from friends/family/society/talking about suicide/substance abuse ( +sbirt)/purposeless- no reason for living/hopelessness

## 2018-08-03 NOTE — ED PROVIDER NOTE - NS ED ROS FT
Except as documented in HPI, all other ROS negative.   GENERAL: Denies fever/chills, loss of appetite/weight or fatigue.  SKIN: Denies rashes, abrasions, lacerations, ecchymosis, erythema, or edema.  HEAD: Denies headache, dizziness or trauma.  CARDIAC: Denies chest pain, palpitations, or SOB.   RESPIRATORY: Denies SOB, cough, hemoptysis or wheezing.   GI: Denies abdominal pain, n/v/d.   MSK: Denies myalgias, bony deformity or pain.   NEURO: Denies paresthesias, tingling, weakness.

## 2018-08-03 NOTE — ED PROVIDER NOTE - OBJECTIVE STATEMENT
Pt is a 26 y/o Female, PMHX of polysubstance abuse, PCOS, Depression, DM, Hep C, hyperthyroid, presents to ED needed psychiatric meds. Pt reports she was just recently discharged 4 days ago, had medication sent to pharmacy but has not been able to pick it up as she has no money, Pt reports she had a plan of where to stay once she got home, but once she got there, was not allowed in. Pt reports she began using drugs again. Does endorse suicidal ideations, but has no plan in place. Denies HI. States she would just like her meds. Pt denies chest pain, SOB, abdominal pain, n/v, weakness, numbness.

## 2018-08-03 NOTE — ED ADULT NURSE NOTE - NSIMPLEMENTINTERV_GEN_ALL_ED
Implemented All Universal Safety Interventions:  [place] to call system. Call bell, personal items and telephone within reach. Instruct patient to call for assistance. Room bathroom lighting operational. Non-slip footwear when patient is off stretcher. Physically safe environment: no spills, clutter or unnecessary equipment. Stretcher in lowest position, wheels locked, appropriate side rails in place.

## 2018-08-03 NOTE — ED PROVIDER NOTE - ATTENDING CONTRIBUTION TO CARE
I have personally performed a history and physical exam on this patient and personally directed the management of the patient. Patient presents requesting medication evaluation. she takes medication but was unable to fill her rx.  she did not reort any SI or HI to me though this is different than pa's history.  On physica exam she is nc/at perrla oropharynx clear she is able to ambulate with no focal deficits.   we consulted marlo in the ED I will discharge with reccomendation and outpatient information

## 2018-08-03 NOTE — ED ADULT NURSE NOTE - CHIEF COMPLAINT QUOTE
I just got out of drug rehab 2 days ago I havent taken my medication and I need a psych consult I don't want to hurt myself or any one else I just got out of drug rehab 2 days ago I havent taken my medication and I need a psych consult

## 2018-08-03 NOTE — ED PROVIDER NOTE - PROGRESS NOTE DETAILS
As per psych Dr. Aguirre, no need for psych admission at this time or detox admission. Recommending [facility] and 2 week supply of meds if pt does need. Spoke w/ pt - pt has medication waiting at pharmacy. She has not picked it up yet. Will give [facility] info. Signs and symptoms which should prompt return discussed in detail and pt informed they may return to ED at any time. Pt agreeable with plan and comfortable with discharge. Pt's father came to pick pt up - had called detox intake across the street, spoke with Otilia at [phone], and states they would like me to speak with her and that pt would be allowed across the street to detox. I called Otilia, informed her pt is cleared to be discharged and can come across the street to detox. Detox expecting pt.

## 2018-08-03 NOTE — ED ADULT TRIAGE NOTE - CHIEF COMPLAINT QUOTE
I just got out of drug rehab 2 days ago I havent taken my medication and I need a psych consult I don't want to hurt myself or any one else

## 2018-08-03 NOTE — ED PROVIDER NOTE - PHYSICAL EXAMINATION
VITAL SIGNS: I have reviewed the initial vital signs.   CONSTITUTIONAL: Awake, alert. Well-developed; well-nourished; in no distress. Non-toxic appearing.   SKIN: No rash, vesicles/lesion, abrasions or lacerations. No ecchymosis or signs of trauma.   HEAD: Normocephalic; atraumatic.   CARD: No chest wall deformity or tenderness. S1, S2 normal; no murmurs, gallops, or rubs. Regular rate and rhythm.  RESP: Good air movement. Lungs CTAB. No crackles, wheezes, rales or rhonchi.  ABD: Soft; non-distended; non-tender.   EXT: No bony deformity or tenderness. Normal ROM x 4 extremities.   PSYCH: Cooperative, appropriate.

## 2018-08-14 LAB
AMPHET UR-MCNC: NEGATIVE — SIGNIFICANT CHANGE UP
BARBITURATES UR SCN-MCNC: NEGATIVE — SIGNIFICANT CHANGE UP
BENZODIAZ UR-MCNC: NEGATIVE — SIGNIFICANT CHANGE UP
COCAINE METAB.OTHER UR-MCNC: POSITIVE
METHADONE UR-MCNC: NEGATIVE — SIGNIFICANT CHANGE UP
OPIATES UR-MCNC: NEGATIVE — SIGNIFICANT CHANGE UP
PCP SPEC-MCNC: SIGNIFICANT CHANGE UP
PROPOXYPHENE QUALITATIVE URINE RESULT: NEGATIVE — SIGNIFICANT CHANGE UP

## 2018-08-28 ENCOUNTER — EMERGENCY (EMERGENCY)
Facility: HOSPITAL | Age: 28
LOS: 0 days | Discharge: HOME | End: 2018-08-28
Attending: EMERGENCY MEDICINE | Admitting: EMERGENCY MEDICINE

## 2018-08-28 VITALS
HEART RATE: 108 BPM | SYSTOLIC BLOOD PRESSURE: 145 MMHG | DIASTOLIC BLOOD PRESSURE: 90 MMHG | RESPIRATION RATE: 18 BRPM | TEMPERATURE: 98 F | OXYGEN SATURATION: 97 %

## 2018-08-28 DIAGNOSIS — E11.9 TYPE 2 DIABETES MELLITUS WITHOUT COMPLICATIONS: ICD-10-CM

## 2018-08-28 DIAGNOSIS — Z79.899 OTHER LONG TERM (CURRENT) DRUG THERAPY: ICD-10-CM

## 2018-08-28 DIAGNOSIS — N61.1 ABSCESS OF THE BREAST AND NIPPLE: ICD-10-CM

## 2018-08-28 DIAGNOSIS — Z88.8 ALLERGY STATUS TO OTHER DRUGS, MEDICAMENTS AND BIOLOGICAL SUBSTANCES STATUS: ICD-10-CM

## 2018-08-28 DIAGNOSIS — M79.1 MYALGIA: ICD-10-CM

## 2018-08-28 DIAGNOSIS — Z79.84 LONG TERM (CURRENT) USE OF ORAL HYPOGLYCEMIC DRUGS: ICD-10-CM

## 2018-08-28 LAB
ALBUMIN SERPL ELPH-MCNC: 3.6 G/DL — SIGNIFICANT CHANGE UP (ref 3.5–5.2)
ALP SERPL-CCNC: 65 U/L — SIGNIFICANT CHANGE UP (ref 30–115)
ALT FLD-CCNC: 21 U/L — SIGNIFICANT CHANGE UP (ref 0–41)
ANION GAP SERPL CALC-SCNC: 12 MMOL/L — SIGNIFICANT CHANGE UP (ref 7–14)
AST SERPL-CCNC: 20 U/L — SIGNIFICANT CHANGE UP (ref 0–41)
BASOPHILS # BLD AUTO: 0.01 K/UL — SIGNIFICANT CHANGE UP (ref 0–0.2)
BASOPHILS NFR BLD AUTO: 0.1 % — SIGNIFICANT CHANGE UP (ref 0–1)
BILIRUB SERPL-MCNC: 0.3 MG/DL — SIGNIFICANT CHANGE UP (ref 0.2–1.2)
BUN SERPL-MCNC: 10 MG/DL — SIGNIFICANT CHANGE UP (ref 10–20)
CALCIUM SERPL-MCNC: 9.4 MG/DL — SIGNIFICANT CHANGE UP (ref 8.5–10.1)
CHLORIDE SERPL-SCNC: 101 MMOL/L — SIGNIFICANT CHANGE UP (ref 98–110)
CO2 SERPL-SCNC: 22 MMOL/L — SIGNIFICANT CHANGE UP (ref 17–32)
CREAT SERPL-MCNC: 0.6 MG/DL — LOW (ref 0.7–1.5)
EOSINOPHIL # BLD AUTO: 0.19 K/UL — SIGNIFICANT CHANGE UP (ref 0–0.7)
EOSINOPHIL NFR BLD AUTO: 2 % — SIGNIFICANT CHANGE UP (ref 0–8)
ERYTHROCYTE [SEDIMENTATION RATE] IN BLOOD: 63 MM/HR — HIGH (ref 0–15)
GLUCOSE SERPL-MCNC: 141 MG/DL — HIGH (ref 70–99)
HCT VFR BLD CALC: 37.6 % — SIGNIFICANT CHANGE UP (ref 37–47)
HGB BLD-MCNC: 12.4 G/DL — SIGNIFICANT CHANGE UP (ref 12–16)
IMM GRANULOCYTES NFR BLD AUTO: 0.3 % — SIGNIFICANT CHANGE UP (ref 0.1–0.3)
LACTATE SERPL-SCNC: 1.5 MMOL/L — SIGNIFICANT CHANGE UP (ref 0.5–2.2)
LYMPHOCYTES # BLD AUTO: 2.76 K/UL — SIGNIFICANT CHANGE UP (ref 1.2–3.4)
LYMPHOCYTES # BLD AUTO: 28.9 % — SIGNIFICANT CHANGE UP (ref 20.5–51.1)
MCHC RBC-ENTMCNC: 27 PG — SIGNIFICANT CHANGE UP (ref 27–31)
MCHC RBC-ENTMCNC: 33 G/DL — SIGNIFICANT CHANGE UP (ref 32–37)
MCV RBC AUTO: 81.7 FL — SIGNIFICANT CHANGE UP (ref 81–99)
MONOCYTES # BLD AUTO: 0.66 K/UL — HIGH (ref 0.1–0.6)
MONOCYTES NFR BLD AUTO: 6.9 % — SIGNIFICANT CHANGE UP (ref 1.7–9.3)
NEUTROPHILS # BLD AUTO: 5.91 K/UL — SIGNIFICANT CHANGE UP (ref 1.4–6.5)
NEUTROPHILS NFR BLD AUTO: 61.8 % — SIGNIFICANT CHANGE UP (ref 42.2–75.2)
NRBC # BLD: 0 /100 WBCS — SIGNIFICANT CHANGE UP (ref 0–0)
PLATELET # BLD AUTO: 373 K/UL — SIGNIFICANT CHANGE UP (ref 130–400)
POTASSIUM SERPL-MCNC: 4.7 MMOL/L — SIGNIFICANT CHANGE UP (ref 3.5–5)
POTASSIUM SERPL-SCNC: 4.7 MMOL/L — SIGNIFICANT CHANGE UP (ref 3.5–5)
PROT SERPL-MCNC: 7.8 G/DL — SIGNIFICANT CHANGE UP (ref 6–8)
RBC # BLD: 4.6 M/UL — SIGNIFICANT CHANGE UP (ref 4.2–5.4)
RBC # FLD: 13.3 % — SIGNIFICANT CHANGE UP (ref 11.5–14.5)
SODIUM SERPL-SCNC: 135 MMOL/L — SIGNIFICANT CHANGE UP (ref 135–146)
WBC # BLD: 9.56 K/UL — SIGNIFICANT CHANGE UP (ref 4.8–10.8)
WBC # FLD AUTO: 9.56 K/UL — SIGNIFICANT CHANGE UP (ref 4.8–10.8)

## 2018-08-28 RX ORDER — SODIUM CHLORIDE 9 MG/ML
1000 INJECTION INTRAMUSCULAR; INTRAVENOUS; SUBCUTANEOUS ONCE
Qty: 0 | Refills: 0 | Status: COMPLETED | OUTPATIENT
Start: 2018-08-28 | End: 2018-08-28

## 2018-08-28 RX ORDER — AZTREONAM 2 G
1 VIAL (EA) INJECTION
Qty: 20 | Refills: 0 | OUTPATIENT
Start: 2018-08-28 | End: 2018-09-06

## 2018-08-28 RX ORDER — ACETAMINOPHEN 500 MG
650 TABLET ORAL ONCE
Qty: 0 | Refills: 0 | Status: COMPLETED | OUTPATIENT
Start: 2018-08-28 | End: 2018-08-28

## 2018-08-28 RX ADMIN — SODIUM CHLORIDE 2000 MILLILITER(S): 9 INJECTION INTRAMUSCULAR; INTRAVENOUS; SUBCUTANEOUS at 14:53

## 2018-08-28 RX ADMIN — Medication 650 MILLIGRAM(S): at 15:23

## 2018-08-28 NOTE — ED PROVIDER NOTE - PROGRESS NOTE DETAILS
Patient feeling better. discussed results with patient, will continue with Augmentin prescribed for another 8 days, Bactrim DS to be added onto regimen.

## 2018-08-28 NOTE — ED PROVIDER NOTE - MEDICAL DECISION MAKING DETAILS
right breast infection that is not fluctuant but tender and erythematous with no draiagin and left arm abscess that ruptured after she stuck a pin in it to pop it that is still expressing pus it is approximately 2x2 cm on left arm withtout adenopathy, the right breast is 2x2 cm warm, erythema, no discharge at 2pm. she complains of body aches but no fevers or chills. She is already on augmentin for 3 days. plan is to check basic blood work, start on mrsa coverage and ID of left arm.

## 2018-08-28 NOTE — ED PROVIDER NOTE - NS ED ROS FT
CONSTITUTIONAL: + tactile fever, chills  EYES/ENT: No visual changes;  No vertigo or throat pain   NECK: No pain or stiffness. No cervical midline tenderness.  RESPIRATORY: No difficulty breathing, cough, wheezing.  CARDIOVASCULAR: No chest pain, SOB, or palpitations  GASTROINTESTINAL: No abdominal or epigastric pain. No nausea, vomiting, or hematemesis. No diarrhea or constipation. No melena, BRBPR, or hematochezia.  GENITOURINARY: No dysuria, frequency or hematuria.  NEUROLOGICAL: No headache. No numbness or weakness.  SKIN: No itching, rashes.

## 2018-08-28 NOTE — ED PROVIDER NOTE - OBJECTIVE STATEMENT
26 y/o F with PMHx of DM, Hep C diagnosed 8 months ago(not treated), Hyperthyroidism, opoid abuse, PCOS, cocaine and methamphetamine abuse(IVDA) presenting to ED for medical eval. Patient states she has been have muscle aches, tactile fevers for 2 days. Admits to IVDA, mainly crystal meth, has used heroine in the past, c/o abscess in LUE and right breast. Has tried draining LUE abscess with pin at home with some purulent drainage - ongoing for past 3 days and enlarging. Right breast abscess enlarging with surround erythema slightly worse than 1 day ago- ongoing for 4 days. Patient denies N/V, D/C, flank pain, abdominal pain, chest pain, shortness of breath. 28 y/o F with PMHx of DM, Hep C diagnosed 8 months ago(not treated), Hyperthyroidism, opoid abuse, PCOS, cocaine and methamphetamine abuse(IVDA) presenting to ED for medical eval. Patient states she has been have muscle aches, tactile fevers for 2 days. Admits to IVDA, mainly crystal meth, has used heroine in the past, c/o abscess in LUE and right breast. Has tried draining LUE abscess with pin at home with some purulent drainage - ongoing for past 3 days and enlarging. Right breast abscess enlarging with surround erythema slightly worse than 1 day ago- ongoing for 4 days. Patient denies N/V, D/C, flank pain, abdominal pain, chest pain, shortness of breath. Patient has been taking Augmentin for 2 days, was seen in UC several days ago.

## 2018-08-28 NOTE — ED PROCEDURE NOTE - ATTENDING CONTRIBUTION TO CARE
I was present for and supervised the key critical aspects of the procedures performed during the care of the patient.

## 2018-08-28 NOTE — ED PROVIDER NOTE - PHYSICAL EXAMINATION
Well appearing obese female in NAD non toxic. NCAT EOMI conjunctiva nml. No nasal discharge. MMM. Neck supple, non tender, full ROM. RRR no MRG +S1S2. CTA b/l. Abd s NT ND +BS. Ext WWP x4, moving all extremities, no edema. 2+ equal pulses throughout. Cooperative, appropriate.     LUE on medial aspect 2x2 cm fluctuant, indurated mass with surrounding erythema - non-draining, tender to palpation.    Right breast fluctuant, indurated 3 x 3 cm mass with surrounding erythema - non-draining, tender to palpation.

## 2018-08-28 NOTE — ED ADULT NURSE NOTE - NSIMPLEMENTINTERV_GEN_ALL_ED
Implemented All Universal Safety Interventions:  [place] to call system. Call bell, personal items and telephone within reach. Instruct patient to call for assistance. Room bathroom lighting operational. Non-slip footwear when patient is off stretcher. Physically safe environment: no spills, clutter or unnecessary equipment. Stretcher in lowest position, wheels locked, appropriate side rails in place.

## 2018-09-03 LAB
CULTURE RESULTS: SIGNIFICANT CHANGE UP
CULTURE RESULTS: SIGNIFICANT CHANGE UP
SPECIMEN SOURCE: SIGNIFICANT CHANGE UP
SPECIMEN SOURCE: SIGNIFICANT CHANGE UP

## 2018-09-10 ENCOUNTER — OUTPATIENT (OUTPATIENT)
Dept: OUTPATIENT SERVICES | Facility: HOSPITAL | Age: 28
LOS: 1 days | Discharge: HOME | End: 2018-09-10

## 2018-09-10 DIAGNOSIS — F11.20 OPIOID DEPENDENCE, UNCOMPLICATED: ICD-10-CM

## 2018-09-10 DIAGNOSIS — F31.81 BIPOLAR II DISORDER: ICD-10-CM

## 2018-09-10 DIAGNOSIS — F14.20 COCAINE DEPENDENCE, UNCOMPLICATED: ICD-10-CM

## 2019-01-02 ENCOUNTER — EMERGENCY (EMERGENCY)
Facility: HOSPITAL | Age: 29
LOS: 0 days | Discharge: LEFT BEFORE DISCHARGE | End: 2019-01-02
Attending: EMERGENCY MEDICINE | Admitting: EMERGENCY MEDICINE

## 2019-01-02 VITALS
HEART RATE: 103 BPM | OXYGEN SATURATION: 98 % | RESPIRATION RATE: 18 BRPM | DIASTOLIC BLOOD PRESSURE: 84 MMHG | SYSTOLIC BLOOD PRESSURE: 161 MMHG | TEMPERATURE: 98 F

## 2019-01-02 VITALS
SYSTOLIC BLOOD PRESSURE: 151 MMHG | TEMPERATURE: 97 F | OXYGEN SATURATION: 99 % | HEART RATE: 113 BPM | RESPIRATION RATE: 18 BRPM | DIASTOLIC BLOOD PRESSURE: 86 MMHG

## 2019-01-02 DIAGNOSIS — S50.852A SUPERFICIAL FOREIGN BODY OF LEFT FOREARM, INITIAL ENCOUNTER: ICD-10-CM

## 2019-01-02 DIAGNOSIS — E11.9 TYPE 2 DIABETES MELLITUS WITHOUT COMPLICATIONS: ICD-10-CM
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
## 2019-01-02 DIAGNOSIS — Z79.2 LONG TERM (CURRENT) USE OF ANTIBIOTICS: ICD-10-CM

## 2019-01-02 DIAGNOSIS — Z79.899 OTHER LONG TERM (CURRENT) DRUG THERAPY: ICD-10-CM

## 2019-01-02 DIAGNOSIS — Z79.84 LONG TERM (CURRENT) USE OF ORAL HYPOGLYCEMIC DRUGS: ICD-10-CM
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
## 2019-01-02 DIAGNOSIS — Z79.891 LONG TERM (CURRENT) USE OF OPIATE ANALGESIC: ICD-10-CM

## 2019-01-02 DIAGNOSIS — Z86.19 PERSONAL HISTORY OF OTHER INFECTIOUS AND PARASITIC DISEASES: ICD-10-CM

## 2019-01-02 LAB
BASOPHILS # BLD AUTO: 0.01 K/UL — SIGNIFICANT CHANGE UP (ref 0–0.2)
BASOPHILS NFR BLD AUTO: 0.1 % — SIGNIFICANT CHANGE UP (ref 0–1)
EOSINOPHIL # BLD AUTO: 0.23 K/UL — SIGNIFICANT CHANGE UP (ref 0–0.7)
EOSINOPHIL NFR BLD AUTO: 2 % — SIGNIFICANT CHANGE UP (ref 0–8)
HCT VFR BLD CALC: 42.3 % — SIGNIFICANT CHANGE UP (ref 37–47)
HGB BLD-MCNC: 13.5 G/DL — SIGNIFICANT CHANGE UP (ref 12–16)
IMM GRANULOCYTES NFR BLD AUTO: 0.3 % — SIGNIFICANT CHANGE UP (ref 0.1–0.3)
LYMPHOCYTES # BLD AUTO: 2.63 K/UL — SIGNIFICANT CHANGE UP (ref 1.2–3.4)
LYMPHOCYTES # BLD AUTO: 23 % — SIGNIFICANT CHANGE UP (ref 20.5–51.1)
MCHC RBC-ENTMCNC: 26.2 PG — LOW (ref 27–31)
MCHC RBC-ENTMCNC: 31.9 G/DL — LOW (ref 32–37)
MCV RBC AUTO: 82 FL — SIGNIFICANT CHANGE UP (ref 81–99)
MONOCYTES # BLD AUTO: 0.64 K/UL — HIGH (ref 0.1–0.6)
MONOCYTES NFR BLD AUTO: 5.6 % — SIGNIFICANT CHANGE UP (ref 1.7–9.3)
NEUTROPHILS # BLD AUTO: 7.9 K/UL — HIGH (ref 1.4–6.5)
NEUTROPHILS NFR BLD AUTO: 69 % — SIGNIFICANT CHANGE UP (ref 42.2–75.2)
NRBC # BLD: 0 /100 WBCS — SIGNIFICANT CHANGE UP (ref 0–0)
PLATELET # BLD AUTO: 369 K/UL — SIGNIFICANT CHANGE UP (ref 130–400)
RBC # BLD: 5.16 M/UL — SIGNIFICANT CHANGE UP (ref 4.2–5.4)
RBC # FLD: 14 % — SIGNIFICANT CHANGE UP (ref 11.5–14.5)
WBC # BLD: 11.45 K/UL — HIGH (ref 4.8–10.8)
WBC # FLD AUTO: 11.45 K/UL — HIGH (ref 4.8–10.8)

## 2019-01-02 NOTE — CONSULT NOTE ADULT - ASSESSMENT
28F with needle tip in left arm (initial injury 2 weeks ago), on 4th day of doxycyline presents for evaluation    Plan:  No need for acute surgical intervention at this time  Please obtain CBC and set of labs to r/o signs if systemic infection - if elevated will reevaluate  Extensive discussion had with patient at bedside explaining reason for non operative management and what to expect    Plan discussed with Dr. Cummings

## 2019-01-02 NOTE — ED ADULT NURSE NOTE - NS ED NURSE ELOPE COMMENTS
No Hep Lock in palce at time of ED visit. Pt ambulating steady gait, a+ox4 at time of elopement No Hep Lock in palce at time of ED visit. Pt ambulating steady gait, a+ox4 at time of elopement. PA aware

## 2019-01-02 NOTE — ED PROVIDER NOTE - NS ED ROS FT
Constitutional: (-) fever, (-) chills,  Cardiovascular: (-) chest pain, (-) syncope  Respiratory: (-) cough, (-) shortness of breath, (-) dyspnea,   Gastrointestinal: (-) vomiting, (-) diarrhea, (-)nausea  Musculoskeletal: (-) neck pain, (-) back pain, (-) joint pain  Integumentary: (-) rash, (-) edema, (-) bruises, (-)color changes, (-)lumps,   Neurological: (-) headache, (-) dizziness, (-) tingling, (-)numbness,  Peripheral Vascular: (-) pain while walking, (-) leg swelling, (-) color changes  :  (-)dysuria  Allergic/Immunologic: (-) pruritus

## 2019-01-02 NOTE — ED PROVIDER NOTE - MEDICAL DECISION MAKING DETAILS
Pt evaluated for foreign body in arm, had imaging and surgery eval, CBC, eloped prior to results and disposition.

## 2019-01-02 NOTE — CONSULT NOTE ADULT - SUBJECTIVE AND OBJECTIVE BOX
LYNDON SMITH [id number]  28y Female PMH below presents with left arm pain. States she was injecting crystal meth in her left arm approximately 2 weeks ago when she noticed the tip broke off. [place] [facility] and went to ED in [place] where she was started on doxycycline. Stated since that time she has had persistent discomfort and came to ED here for evaluation      PAST MEDICAL & SURGICAL HISTORY:  Opioid abuse  Cocaine abuse  Hepatitis C  Depression  PCOS (polycystic ovarian syndrome)  Diabetes  Hyperthyroidism  No significant past surgical history        MEDICATIONS  (STANDING):    MEDICATIONS  (PRN):      Allergies    BuSpar (Unknown)  clonidine (Unknown)  Lamictal (Unknown)  Nicotine Patch (Rash (Mild))    Intolerances        REVIEW OF SYSTEMS    [X] A ten-point review of systems was otherwise negative except as noted.  [ ] Due to altered mental status/intubation, subjective information were not able to be obtained from the patient. History was obtained, to the extent possible, from review of the chart and collateral sources of information.      Vital Signs Last 24 Hrs  T(C): 36.3 (02 Jan 2019 10:26), Max: 36.3 (02 Jan 2019 10:26)  T(F): 97.3 (02 Jan 2019 10:26), Max: 97.3 (02 Jan 2019 10:26)  HR: 113 (02 Jan 2019 10:26) (113 - 113)  BP: 151/86 (02 Jan 2019 10:26) (151/86 - 151/86)  BP(mean): --  RR: 18 (02 Jan 2019 10:26) (18 - 18)  SpO2: 99% (02 Jan 2019 10:26) (99% - 99%)    PHYSICAL EXAM:  GENERAL: NAD, well-appearing  CHEST/LUNG: Clear to auscultation bilaterally  HEART: Regular rate and rhythm  ABDOMEN: Soft, Nontender, Nondistended;   EXTREMITIES: left arm with indurated portion on medial portion of left arm, no signs of erythema cellulitis, drainage or infection, full active and passive ROM    LABS: None            RADIOLOGY & ADDITIONAL STUDIES:  < from: Xray Elbow AP + Lateral + Oblique, Left (01.02.19 @ 12:56) >  There is linear metallic needle like foreign body overlying medial aspect   of elbow adjacent to the trochlea.    No evidence of fracture or dislocation.  There is mild soft tissue swelling around the elbow.    < end of copied text >

## 2019-01-02 NOTE — ED ADULT NURSE NOTE - OBJECTIVE STATEMENT
c/o "a needle is stuck in my left arm," x 2 weeks states she was seen at urgent care and hospital where she received IV antibiotics x 2 days during her hospital stay and given doxycycline p/o to go home with, states she is on her 4th day of antibiotics.  Pt admits to IV crystal meth use and cocaine use. Hx of bipolar disorder.

## 2019-01-02 NOTE — ED PROVIDER NOTE - ATTENDING CONTRIBUTION TO CARE
Pt eloped prior to my evaluation at bedside. Imaging reviewed. Surgery consulted, pt left ED prior to any results or disposition.

## 2019-01-02 NOTE — ED ADULT NURSE NOTE - NSIMPLEMENTINTERV_GEN_ALL_ED
Implemented All Universal Safety Interventions:  [place] to call system. Call bell, personal items and telephone within reach. Instruct patient to call for assistance. Room bathroom lighting operational. Non-slip footwear when patient is off stretcher. Physically safe environment: no spills, clutter or unnecessary equipment. Stretcher in lowest position, wheels locked, appropriate side rails in place.

## 2019-01-02 NOTE — ED PROVIDER NOTE - PHYSICAL EXAMINATION
Physical Exam    Vital Signs: I have reviewed the initial vital signs.  Constitutional: well-nourished, appears stated age, no acute distress  Eyes: Conjunctiva pink, Sclera clear, PERRLA, EOMI.  Cardiovascular: S1 and S2, regular rate, regular rhythm, well-perfused extremities, radial pulses equal and 2+  Respiratory: unlabored respiratory effort, clear to auscultation bilaterally no wheezing, rales and rhonchi  Gastrointestinal: soft, non-tender abdomen, no pulsatile mass, normal bowl sounds  Musculoskeletal: supple neck, no lower extremity edema, no midline tenderness, FROM of extremities x4.   Integumentary: warm, dry, no rash, small ecchymosis to left ac, non ttp.   Neurologic: awake, alert, cranial nerves II-XII grossly intact, extremities’ motor and sensory functions grossly intact

## 2019-01-02 NOTE — ED ADULT NURSE NOTE - CHIEF COMPLAINT QUOTE
" I have a crystal meth needle stuck in my left arm", patient reports being seen, treated and admitted to hospital in [place] where they would not " do anything but pump me with antibiotics. the need is still in there". patient reports smoking crack x 2 days ago.

## 2019-01-02 NOTE — ED ADULT TRIAGE NOTE - CHIEF COMPLAINT QUOTE
" I have a crystal meth needle stuck in my left arm", patient reports being seen, treated and admitted to hospital in [place] where they would not " do anything but pump me with antibiotics. the need is still in there". patient reports smoking crack x 2 days ago.

## 2019-01-02 NOTE — ED PROVIDER NOTE - OBJECTIVE STATEMENT
27 yo female, pmh of Cocaine abuse, Depression, DM, Hepatitis C, Hyperthyroidism, Opioid abuse, and PCOS presents to the ED for evaluation of FB in left AC. Pt reports used need for crystal meth, piece of needle broke off in left arm, was seen at different ED, admitted for IV abx and dc three days ago. Pt reports because needle is still in left ac. Pt currently on day 4/10 of doxy. Denies fever, chills, cp, sob, cough, abd pain, nvd, ha, numbness, tingling, dysuria, rash, redness, skin changes, lower extremity pain or swelling.

## 2019-03-05 ENCOUNTER — INPATIENT (INPATIENT)
Facility: HOSPITAL | Age: 29
LOS: 10 days | Discharge: HOME | End: 2019-03-16
Attending: INTERNAL MEDICINE | Admitting: INTERNAL MEDICINE

## 2019-03-05 VITALS
WEIGHT: 293 LBS | DIASTOLIC BLOOD PRESSURE: 59 MMHG | RESPIRATION RATE: 16 BRPM | SYSTOLIC BLOOD PRESSURE: 115 MMHG | TEMPERATURE: 98 F | HEART RATE: 80 BPM | HEIGHT: 66 IN

## 2019-03-05 DIAGNOSIS — F13.20 SEDATIVE, HYPNOTIC OR ANXIOLYTIC DEPENDENCE, UNCOMPLICATED: ICD-10-CM

## 2019-03-05 DIAGNOSIS — I10 ESSENTIAL (PRIMARY) HYPERTENSION: ICD-10-CM

## 2019-03-05 DIAGNOSIS — E05.90 THYROTOXICOSIS, UNSPECIFIED WITHOUT THYROTOXIC CRISIS OR STORM: ICD-10-CM

## 2019-03-05 DIAGNOSIS — B19.20 UNSPECIFIED VIRAL HEPATITIS C WITHOUT HEPATIC COMA: ICD-10-CM

## 2019-03-05 DIAGNOSIS — F14.20 COCAINE DEPENDENCE, UNCOMPLICATED: ICD-10-CM

## 2019-03-05 DIAGNOSIS — F31.9 BIPOLAR DISORDER, UNSPECIFIED: ICD-10-CM

## 2019-03-05 DIAGNOSIS — F43.10 POST-TRAUMATIC STRESS DISORDER, UNSPECIFIED: ICD-10-CM

## 2019-03-05 DIAGNOSIS — F11.20 OPIOID DEPENDENCE, UNCOMPLICATED: ICD-10-CM

## 2019-03-05 DIAGNOSIS — J45.909 UNSPECIFIED ASTHMA, UNCOMPLICATED: ICD-10-CM

## 2019-03-05 DIAGNOSIS — E28.2 POLYCYSTIC OVARIAN SYNDROME: ICD-10-CM

## 2019-03-05 DIAGNOSIS — E66.9 OBESITY, UNSPECIFIED: ICD-10-CM

## 2019-03-05 DIAGNOSIS — F17.200 NICOTINE DEPENDENCE, UNSPECIFIED, UNCOMPLICATED: ICD-10-CM

## 2019-03-05 LAB
ALBUMIN SERPL ELPH-MCNC: 3.5 G/DL — SIGNIFICANT CHANGE UP (ref 3.5–5.2)
ALP SERPL-CCNC: 69 U/L — SIGNIFICANT CHANGE UP (ref 30–115)
ALT FLD-CCNC: 16 U/L — SIGNIFICANT CHANGE UP (ref 0–41)
AMPHET UR-MCNC: NEGATIVE — SIGNIFICANT CHANGE UP
ANION GAP SERPL CALC-SCNC: 9 MMOL/L — SIGNIFICANT CHANGE UP (ref 7–14)
APPEARANCE UR: CLEAR — SIGNIFICANT CHANGE UP
AST SERPL-CCNC: 10 U/L — SIGNIFICANT CHANGE UP (ref 0–41)
BACTERIA # UR AUTO: ABNORMAL
BARBITURATES UR SCN-MCNC: NEGATIVE — SIGNIFICANT CHANGE UP
BASOPHILS # BLD AUTO: 0.01 K/UL — SIGNIFICANT CHANGE UP (ref 0–0.2)
BASOPHILS NFR BLD AUTO: 0.1 % — SIGNIFICANT CHANGE UP (ref 0–1)
BENZODIAZ UR-MCNC: NEGATIVE — SIGNIFICANT CHANGE UP
BILIRUB SERPL-MCNC: 0.3 MG/DL — SIGNIFICANT CHANGE UP (ref 0.2–1.2)
BILIRUB UR-MCNC: NEGATIVE — SIGNIFICANT CHANGE UP
BUN SERPL-MCNC: 19 MG/DL — SIGNIFICANT CHANGE UP (ref 10–20)
CALCIUM SERPL-MCNC: 9.1 MG/DL — SIGNIFICANT CHANGE UP (ref 8.5–10.1)
CHLORIDE SERPL-SCNC: 107 MMOL/L — SIGNIFICANT CHANGE UP (ref 98–110)
CHOLEST SERPL-MCNC: 160 MG/DL — SIGNIFICANT CHANGE UP (ref 100–200)
CO2 SERPL-SCNC: 24 MMOL/L — SIGNIFICANT CHANGE UP (ref 17–32)
COCAINE METAB.OTHER UR-MCNC: POSITIVE
COD CRY URNS QL: NEGATIVE — SIGNIFICANT CHANGE UP
COLOR SPEC: ABNORMAL
CREAT SERPL-MCNC: 0.9 MG/DL — SIGNIFICANT CHANGE UP (ref 0.7–1.5)
DIFF PNL FLD: ABNORMAL
DRUG SCREEN 1, URINE RESULT: SIGNIFICANT CHANGE UP
EOSINOPHIL # BLD AUTO: 0.48 K/UL — SIGNIFICANT CHANGE UP (ref 0–0.7)
EOSINOPHIL NFR BLD AUTO: 4.4 % — SIGNIFICANT CHANGE UP (ref 0–8)
EPI CELLS # UR: ABNORMAL /HPF
ESTIMATED AVERAGE GLUCOSE: 105 MG/DL — SIGNIFICANT CHANGE UP (ref 68–114)
GGT SERPL-CCNC: 18 U/L — SIGNIFICANT CHANGE UP (ref 1–40)
GLUCOSE BLDC GLUCOMTR-MCNC: 98 MG/DL — SIGNIFICANT CHANGE UP (ref 70–99)
GLUCOSE SERPL-MCNC: 150 MG/DL — HIGH (ref 70–99)
GLUCOSE UR QL: NEGATIVE MG/DL — SIGNIFICANT CHANGE UP
GRAN CASTS # UR COMP ASSIST: NEGATIVE — SIGNIFICANT CHANGE UP
HBA1C BLD-MCNC: 5.3 % — SIGNIFICANT CHANGE UP (ref 4–5.6)
HCG UR QL: NEGATIVE — SIGNIFICANT CHANGE UP
HCT VFR BLD CALC: 39.1 % — SIGNIFICANT CHANGE UP (ref 37–47)
HDLC SERPL-MCNC: 36 MG/DL — LOW
HGB BLD-MCNC: 12.6 G/DL — SIGNIFICANT CHANGE UP (ref 12–16)
HYALINE CASTS # UR AUTO: NEGATIVE — SIGNIFICANT CHANGE UP
IMM GRANULOCYTES NFR BLD AUTO: 0.3 % — SIGNIFICANT CHANGE UP (ref 0.1–0.3)
KETONES UR-MCNC: NEGATIVE — SIGNIFICANT CHANGE UP
LEUKOCYTE ESTERASE UR-ACNC: NEGATIVE — SIGNIFICANT CHANGE UP
LIPID PNL WITH DIRECT LDL SERPL: 109 MG/DL — SIGNIFICANT CHANGE UP (ref 4–129)
LYMPHOCYTES # BLD AUTO: 2.78 K/UL — SIGNIFICANT CHANGE UP (ref 1.2–3.4)
LYMPHOCYTES # BLD AUTO: 25.7 % — SIGNIFICANT CHANGE UP (ref 20.5–51.1)
MAGNESIUM SERPL-MCNC: 2 MG/DL — SIGNIFICANT CHANGE UP (ref 1.8–2.4)
MCHC RBC-ENTMCNC: 26.7 PG — LOW (ref 27–31)
MCHC RBC-ENTMCNC: 32.2 G/DL — SIGNIFICANT CHANGE UP (ref 32–37)
MCV RBC AUTO: 82.8 FL — SIGNIFICANT CHANGE UP (ref 81–99)
METHADONE UR-MCNC: NEGATIVE — SIGNIFICANT CHANGE UP
MONOCYTES # BLD AUTO: 0.34 K/UL — SIGNIFICANT CHANGE UP (ref 0.1–0.6)
MONOCYTES NFR BLD AUTO: 3.1 % — SIGNIFICANT CHANGE UP (ref 1.7–9.3)
NEUTROPHILS # BLD AUTO: 7.19 K/UL — HIGH (ref 1.4–6.5)
NEUTROPHILS NFR BLD AUTO: 66.4 % — SIGNIFICANT CHANGE UP (ref 42.2–75.2)
NITRITE UR-MCNC: NEGATIVE — SIGNIFICANT CHANGE UP
NRBC # BLD: 0 /100 WBCS — SIGNIFICANT CHANGE UP (ref 0–0)
OPIATES UR-MCNC: NEGATIVE — SIGNIFICANT CHANGE UP
PCP UR-MCNC: NEGATIVE — SIGNIFICANT CHANGE UP
PH UR: 6 — SIGNIFICANT CHANGE UP (ref 5–8)
PLATELET # BLD AUTO: 314 K/UL — SIGNIFICANT CHANGE UP (ref 130–400)
POTASSIUM SERPL-MCNC: 3.7 MMOL/L — SIGNIFICANT CHANGE UP (ref 3.5–5)
POTASSIUM SERPL-SCNC: 3.7 MMOL/L — SIGNIFICANT CHANGE UP (ref 3.5–5)
PROPOXYPHENE QUALITATIVE URINE RESULT: NEGATIVE — SIGNIFICANT CHANGE UP
PROT SERPL-MCNC: 7 G/DL — SIGNIFICANT CHANGE UP (ref 6–8)
PROT UR-MCNC: 100 MG/DL
RBC # BLD: 4.72 M/UL — SIGNIFICANT CHANGE UP (ref 4.2–5.4)
RBC # FLD: 14.6 % — HIGH (ref 11.5–14.5)
RBC CASTS # UR COMP ASSIST: ABNORMAL /HPF
SODIUM SERPL-SCNC: 140 MMOL/L — SIGNIFICANT CHANGE UP (ref 135–146)
SP GR SPEC: >=1.03 (ref 1.01–1.03)
THC UR QL: POSITIVE
TOTAL CHOLESTEROL/HDL RATIO MEASUREMENT: 4.4 RATIO — SIGNIFICANT CHANGE UP (ref 4–5.5)
TRI-PHOS CRY UR QL COMP ASSIST: NEGATIVE — SIGNIFICANT CHANGE UP
TRIGL SERPL-MCNC: 148 MG/DL — SIGNIFICANT CHANGE UP (ref 10–149)
URATE CRY FLD QL MICRO: NEGATIVE — SIGNIFICANT CHANGE UP
UROBILINOGEN FLD QL: 0.2 MG/DL — SIGNIFICANT CHANGE UP (ref 0.2–0.2)
WBC # BLD: 10.83 K/UL — HIGH (ref 4.8–10.8)
WBC # FLD AUTO: 10.83 K/UL — HIGH (ref 4.8–10.8)
WBC UR QL: SIGNIFICANT CHANGE UP /HPF

## 2019-03-05 RX ORDER — ESCITALOPRAM OXALATE 10 MG/1
20 TABLET, FILM COATED ORAL DAILY
Qty: 0 | Refills: 0 | Status: DISCONTINUED | OUTPATIENT
Start: 2019-03-05 | End: 2019-03-06

## 2019-03-05 RX ORDER — IBUPROFEN 200 MG
400 TABLET ORAL EVERY 6 HOURS
Qty: 0 | Refills: 0 | Status: DISCONTINUED | OUTPATIENT
Start: 2019-03-05 | End: 2019-03-16

## 2019-03-05 RX ORDER — AMLODIPINE BESYLATE 2.5 MG/1
2.5 TABLET ORAL DAILY
Qty: 0 | Refills: 0 | Status: DISCONTINUED | OUTPATIENT
Start: 2019-03-05 | End: 2019-03-16

## 2019-03-05 RX ORDER — HYDROXYZINE HCL 10 MG
50 TABLET ORAL EVERY 6 HOURS
Qty: 0 | Refills: 0 | Status: DISCONTINUED | OUTPATIENT
Start: 2019-03-05 | End: 2019-03-16

## 2019-03-05 RX ORDER — HYDROXYZINE HCL 10 MG
100 TABLET ORAL AT BEDTIME
Qty: 0 | Refills: 0 | Status: DISCONTINUED | OUTPATIENT
Start: 2019-03-05 | End: 2019-03-16

## 2019-03-05 RX ORDER — TOPIRAMATE 25 MG
100 TABLET ORAL
Qty: 0 | Refills: 0 | Status: DISCONTINUED | OUTPATIENT
Start: 2019-03-05 | End: 2019-03-16

## 2019-03-05 RX ORDER — METHOCARBAMOL 500 MG/1
500 TABLET, FILM COATED ORAL EVERY 6 HOURS
Qty: 0 | Refills: 0 | Status: DISCONTINUED | OUTPATIENT
Start: 2019-03-05 | End: 2019-03-16

## 2019-03-05 RX ORDER — ALBUTEROL 90 UG/1
2 AEROSOL, METERED ORAL EVERY 4 HOURS
Qty: 0 | Refills: 0 | Status: DISCONTINUED | OUTPATIENT
Start: 2019-03-05 | End: 2019-03-16

## 2019-03-05 RX ORDER — MAGNESIUM HYDROXIDE 400 MG/1
30 TABLET, CHEWABLE ORAL ONCE
Qty: 0 | Refills: 0 | Status: DISCONTINUED | OUTPATIENT
Start: 2019-03-05 | End: 2019-03-16

## 2019-03-05 RX ORDER — ARIPIPRAZOLE 15 MG/1
30 TABLET ORAL DAILY
Qty: 0 | Refills: 0 | Status: DISCONTINUED | OUTPATIENT
Start: 2019-03-05 | End: 2019-03-06

## 2019-03-05 RX ORDER — PSEUDOEPHEDRINE HCL 30 MG
60 TABLET ORAL EVERY 6 HOURS
Qty: 0 | Refills: 0 | Status: DISCONTINUED | OUTPATIENT
Start: 2019-03-05 | End: 2019-03-16

## 2019-03-05 RX ORDER — METFORMIN HYDROCHLORIDE 850 MG/1
1000 TABLET ORAL
Qty: 0 | Refills: 0 | Status: DISCONTINUED | OUTPATIENT
Start: 2019-03-05 | End: 2019-03-16

## 2019-03-05 RX ORDER — ACETAMINOPHEN 500 MG
650 TABLET ORAL EVERY 4 HOURS
Qty: 0 | Refills: 0 | Status: DISCONTINUED | OUTPATIENT
Start: 2019-03-05 | End: 2019-03-16

## 2019-03-05 RX ORDER — MULTIVIT-MIN/FERROUS GLUCONATE 9 MG/15 ML
1 LIQUID (ML) ORAL DAILY
Qty: 0 | Refills: 0 | Status: DISCONTINUED | OUTPATIENT
Start: 2019-03-05 | End: 2019-03-16

## 2019-03-05 RX ORDER — PROPYLTHIOURACIL 50 MG
100 TABLET ORAL DAILY
Qty: 0 | Refills: 0 | Status: DISCONTINUED | OUTPATIENT
Start: 2019-03-05 | End: 2019-03-16

## 2019-03-05 RX ORDER — PROPYLTHIOURACIL 50 MG
50 TABLET ORAL AT BEDTIME
Qty: 0 | Refills: 0 | Status: DISCONTINUED | OUTPATIENT
Start: 2019-03-05 | End: 2019-03-16

## 2019-03-05 RX ORDER — GABAPENTIN 400 MG/1
600 CAPSULE ORAL
Qty: 0 | Refills: 0 | Status: DISCONTINUED | OUTPATIENT
Start: 2019-03-05 | End: 2019-03-16

## 2019-03-05 RX ORDER — PANTOPRAZOLE SODIUM 20 MG/1
40 TABLET, DELAYED RELEASE ORAL
Qty: 0 | Refills: 0 | Status: DISCONTINUED | OUTPATIENT
Start: 2019-03-05 | End: 2019-03-16

## 2019-03-05 RX ADMIN — Medication 100 MILLIGRAM(S): at 21:09

## 2019-03-05 RX ADMIN — GABAPENTIN 600 MILLIGRAM(S): 400 CAPSULE ORAL at 21:09

## 2019-03-05 RX ADMIN — METFORMIN HYDROCHLORIDE 1000 MILLIGRAM(S): 850 TABLET ORAL at 21:09

## 2019-03-05 RX ADMIN — Medication 50 MILLIGRAM(S): at 21:09

## 2019-03-05 NOTE — H&P ADULT - NSHPPHYSICALEXAM_GEN_ALL_CORE
-  Vital Signs:      Temp: 98.2     Pulse:  90     RR: 14      BP: 114/70                       Constitutional: anxious A&Ox3, WD/WN,Obese  Eyes: PERRLA  Respiratory: +air entry, no rales, no rhonchi, no wheezes  Cardiovascular: +S1 and S2,RRR  Gastrointestinal: +BS, soft, non-tender, not distended, No CVAT  Extremities: no cyanosis, no edema, no calf tenderness,   Vascular: +dorsal pedis and radial pulses, no extremity cyanosis  Neurological: sensation intact, ROM equal B/L, CN II-XII intact, Gait: steady  Skin: no rashes, normal turgor, No track marks   No Decubiti present  No IV lines present  Rectal/Breasts Exam: Deferred

## 2019-03-05 NOTE — H&P ADULT - PROBLEM SELECTOR PLAN 9
Observation  Atarax 50 mg po Q6H  PRN anxiety  Atarax 100 mg po QHS PRN Insomnia  Psych. Consult Prn  Continue:  Abilify 30 mg po daily  Lexapro 20 mg po daily  Neurontin 600 mg po BID  Topamax 100 mg po BID

## 2019-03-05 NOTE — H&P ADULT - PROBLEM SELECTOR PLAN 3
Heart Healthy Diet  Monitor BP q6h  Clonidine 0.1mg PO Q6H PRN for BP >140/90, hold BP <90/60  Continue Norvasc 2.5 mg po daily  Continue Home Meds:

## 2019-03-05 NOTE — H&P ADULT - HISTORY OF PRESENT ILLNESS
This is   a 29 Y/O White Female with Hx of  Opiate, Benzo and Crack Dependency here for Detox, hx of few detoxes and Rehabs in the past, last Detox at [facility] 2018 –  ,followed by Rehab at [facility] 2018 – 2018,was clean for 3 Weeks after D/C, relapsed in 2018.   **  Urine Toxicology done in 2018 , 2018,and Today 3/05/2019 at Ancillary Program  : all Negative for Benzo & Opiate, but all (+) for Cocaine.**    DRUG	AGE OF ONSET	ROUTE	FREQ	AMOUNT	LAST USE	LENGTH OF CURRENT USE	  HEROIN	21 Y/O	IN/IV	Daily	3 Bags	3/04/2019 3 Bags  ??	5 Months	  XANAX	20 Y/O	PO	Daily	4 mg	 3/03/2019  4 mg ??	5 Months	  CRACK	21 Y/O	Smoking	Daily	$300	3/04/2019 $300	5 Months	  							  							    I-Stop:  Negative     Urine Toxicology was done Today 3/05/2019 at [facility] Ancillary Program:           (((((    Urine Toxicology (+) for Cocaine & Cannabis  (-) for Opiate & Benzo))))      Last Detox at [facility] 2018  : (+)Cocaine,Negative for Opiate & Benzo.      Hx of  Overdose :      Yes  (once)                                        last Overdose:  on Heroin    Hx x of Withdrawal Seizures:  NO    MMTP :  NO        OBGYN Hx:     Pt has Hx of Polycystic Ovarian Disease on Metformin 1000 mg po BID.    LMP:    3/05/2019             P:  0                last Pap - Smear  :  2018 (WNL)             Last Mammogram: never had One        Psyhx:  Bipolar D/O, PTSD  Compliant with Abilify, Lexapro, Neurontin, and Topamax                (+)Suicidal attempt at age 21 Y/O Via OD                Denies any S/H Ideation or A/V Hallucination      Screening history	Last tested	Result	History of treatment	  HIV	18	NEG	N/A	  Hepatitis C	18	(POS)	N/A	  Quantiferon GOLD TB test	18	NEG	N/A	    Immunization	Not Received	Unknown	Received	Date Received 	  Influenza		X			  Pneumococcus		X			  Tetanus			X	<10 years	  Others					  					    Patient  denied  any Chest Pain, SOB, Abdominal Pain, HA, Blurry Vision, Bleeding ,or Dysuria

## 2019-03-05 NOTE — H&P ADULT - ASSESSMENT
This is   a 29 Y/O White Female with Hx of  Opiate, Benzo and Crack Dependency here for Detox, hx of few detoxes and Rehabs in the past, last Detox at [facility] 7/16/2018 – 7/ ,followed by Rehab at [facility] 7/19/2018 – 8/03/2018,was clean for 3 Weeks after D/C, relapsed in 11/2018.   **  Urine Toxicology done in 7/16/2018 , 8/13/2018,and Today 3/05/2019 at Ancillary Program  : all Negative for Benzo & Opiate, but all (+) for Cocaine.**

## 2019-03-06 LAB
GLUCOSE BLDC GLUCOMTR-MCNC: 114 MG/DL — HIGH (ref 70–99)
HAV IGM SER-ACNC: SIGNIFICANT CHANGE UP
HBV CORE IGM SER-ACNC: SIGNIFICANT CHANGE UP
HBV SURFACE AG SER-ACNC: SIGNIFICANT CHANGE UP
HCV AB S/CO SERPL IA: 10.59 S/CO — HIGH (ref 0–0.79)
HCV AB SERPL-IMP: REACTIVE
HIV 1+2 AB+HIV1 P24 AG SERPL QL IA: SIGNIFICANT CHANGE UP
T PALLIDUM AB TITR SER: NEGATIVE — SIGNIFICANT CHANGE UP

## 2019-03-06 RX ORDER — ARIPIPRAZOLE 15 MG/1
30 TABLET ORAL AT BEDTIME
Qty: 0 | Refills: 0 | Status: DISCONTINUED | OUTPATIENT
Start: 2019-03-06 | End: 2019-03-16

## 2019-03-06 RX ORDER — ESCITALOPRAM OXALATE 10 MG/1
20 TABLET, FILM COATED ORAL AT BEDTIME
Qty: 0 | Refills: 0 | Status: DISCONTINUED | OUTPATIENT
Start: 2019-03-06 | End: 2019-03-16

## 2019-03-06 RX ADMIN — AMLODIPINE BESYLATE 2.5 MILLIGRAM(S): 2.5 TABLET ORAL at 08:43

## 2019-03-06 RX ADMIN — Medication 100 MILLIGRAM(S): at 08:45

## 2019-03-06 RX ADMIN — Medication 100 MILLIGRAM(S): at 21:21

## 2019-03-06 RX ADMIN — ARIPIPRAZOLE 30 MILLIGRAM(S): 15 TABLET ORAL at 21:21

## 2019-03-06 RX ADMIN — Medication 50 MILLIGRAM(S): at 21:21

## 2019-03-06 RX ADMIN — PANTOPRAZOLE SODIUM 40 MILLIGRAM(S): 20 TABLET, DELAYED RELEASE ORAL at 08:43

## 2019-03-06 RX ADMIN — Medication 1 TABLET(S): at 08:43

## 2019-03-06 RX ADMIN — METFORMIN HYDROCHLORIDE 1000 MILLIGRAM(S): 850 TABLET ORAL at 08:44

## 2019-03-06 RX ADMIN — Medication 200 MILLIGRAM(S): at 14:32

## 2019-03-06 RX ADMIN — METFORMIN HYDROCHLORIDE 1000 MILLIGRAM(S): 850 TABLET ORAL at 21:21

## 2019-03-06 RX ADMIN — ESCITALOPRAM OXALATE 20 MILLIGRAM(S): 10 TABLET, FILM COATED ORAL at 21:21

## 2019-03-06 RX ADMIN — GABAPENTIN 600 MILLIGRAM(S): 400 CAPSULE ORAL at 08:43

## 2019-03-06 RX ADMIN — GABAPENTIN 600 MILLIGRAM(S): 400 CAPSULE ORAL at 21:21

## 2019-03-06 RX ADMIN — Medication 100 MILLIGRAM(S): at 08:42

## 2019-03-07 LAB
APPEARANCE UR: CLEAR — SIGNIFICANT CHANGE UP
BILIRUB UR-MCNC: NEGATIVE — SIGNIFICANT CHANGE UP
COD CRY URNS QL: ABNORMAL
COLOR SPEC: YELLOW — SIGNIFICANT CHANGE UP
DIFF PNL FLD: ABNORMAL
EPI CELLS # UR: ABNORMAL /HPF
GLUCOSE UR QL: NEGATIVE MG/DL — SIGNIFICANT CHANGE UP
HCV RNA FLD QL NAA+PROBE: SIGNIFICANT CHANGE UP
HCV RNA SPEC QL PROBE+SIG AMP: SIGNIFICANT CHANGE UP
KETONES UR-MCNC: ABNORMAL
LEUKOCYTE ESTERASE UR-ACNC: ABNORMAL
NITRITE UR-MCNC: NEGATIVE — SIGNIFICANT CHANGE UP
PH UR: 5.5 — SIGNIFICANT CHANGE UP (ref 5–8)
PROT UR-MCNC: 30 MG/DL
RBC CASTS # UR COMP ASSIST: ABNORMAL /HPF
SP GR SPEC: >=1.03 (ref 1.01–1.03)
UROBILINOGEN FLD QL: 0.2 MG/DL — SIGNIFICANT CHANGE UP (ref 0.2–0.2)
WBC UR QL: ABNORMAL /HPF

## 2019-03-07 RX ADMIN — Medication 50 MILLIGRAM(S): at 21:22

## 2019-03-07 RX ADMIN — Medication 300 MILLIGRAM(S): at 18:25

## 2019-03-07 RX ADMIN — Medication 100 MILLIGRAM(S): at 08:38

## 2019-03-07 RX ADMIN — ARIPIPRAZOLE 30 MILLIGRAM(S): 15 TABLET ORAL at 21:22

## 2019-03-07 RX ADMIN — ESCITALOPRAM OXALATE 20 MILLIGRAM(S): 10 TABLET, FILM COATED ORAL at 21:22

## 2019-03-07 RX ADMIN — PANTOPRAZOLE SODIUM 40 MILLIGRAM(S): 20 TABLET, DELAYED RELEASE ORAL at 08:38

## 2019-03-07 RX ADMIN — METFORMIN HYDROCHLORIDE 1000 MILLIGRAM(S): 850 TABLET ORAL at 08:36

## 2019-03-07 RX ADMIN — Medication 1 TABLET(S): at 08:37

## 2019-03-07 RX ADMIN — Medication 400 MILLIGRAM(S): at 21:23

## 2019-03-07 RX ADMIN — Medication 100 MILLIGRAM(S): at 21:22

## 2019-03-07 RX ADMIN — Medication 100 MILLIGRAM(S): at 08:36

## 2019-03-07 RX ADMIN — GABAPENTIN 600 MILLIGRAM(S): 400 CAPSULE ORAL at 08:37

## 2019-03-07 RX ADMIN — GABAPENTIN 600 MILLIGRAM(S): 400 CAPSULE ORAL at 21:22

## 2019-03-07 RX ADMIN — METFORMIN HYDROCHLORIDE 1000 MILLIGRAM(S): 850 TABLET ORAL at 21:22

## 2019-03-07 RX ADMIN — AMLODIPINE BESYLATE 2.5 MILLIGRAM(S): 2.5 TABLET ORAL at 08:37

## 2019-03-07 NOTE — CHART NOTE - NSCHARTNOTEFT_GEN_A_CORE
Pt was c/o nocturia and lower back pain. Pt denies dysuria.   UA 2 days ago +RBCs -  pt admits to presently having menses    Lower back pain most likely related to this.    Repeat UA - no nitrates.    Pt also requested STD testing urine for NG/CT ordered.

## 2019-03-08 RX ADMIN — PANTOPRAZOLE SODIUM 40 MILLIGRAM(S): 20 TABLET, DELAYED RELEASE ORAL at 08:10

## 2019-03-08 RX ADMIN — Medication 1 TABLET(S): at 08:10

## 2019-03-08 RX ADMIN — Medication 100 MILLIGRAM(S): at 08:10

## 2019-03-08 RX ADMIN — Medication 100 MILLIGRAM(S): at 21:17

## 2019-03-08 RX ADMIN — METFORMIN HYDROCHLORIDE 1000 MILLIGRAM(S): 850 TABLET ORAL at 08:11

## 2019-03-08 RX ADMIN — METFORMIN HYDROCHLORIDE 1000 MILLIGRAM(S): 850 TABLET ORAL at 21:17

## 2019-03-08 RX ADMIN — ARIPIPRAZOLE 30 MILLIGRAM(S): 15 TABLET ORAL at 21:17

## 2019-03-08 RX ADMIN — Medication 50 MILLIGRAM(S): at 16:31

## 2019-03-08 RX ADMIN — GABAPENTIN 600 MILLIGRAM(S): 400 CAPSULE ORAL at 21:17

## 2019-03-08 RX ADMIN — Medication 50 MILLIGRAM(S): at 21:17

## 2019-03-08 RX ADMIN — AMLODIPINE BESYLATE 2.5 MILLIGRAM(S): 2.5 TABLET ORAL at 08:11

## 2019-03-08 RX ADMIN — GABAPENTIN 600 MILLIGRAM(S): 400 CAPSULE ORAL at 08:10

## 2019-03-08 RX ADMIN — Medication 400 MILLIGRAM(S): at 11:51

## 2019-03-08 RX ADMIN — ESCITALOPRAM OXALATE 20 MILLIGRAM(S): 10 TABLET, FILM COATED ORAL at 21:17

## 2019-03-08 RX ADMIN — Medication 100 MILLIGRAM(S): at 08:11

## 2019-03-09 RX ORDER — CIPROFLOXACIN LACTATE 400MG/40ML
500 VIAL (ML) INTRAVENOUS EVERY 12 HOURS
Qty: 0 | Refills: 0 | Status: DISCONTINUED | OUTPATIENT
Start: 2019-03-09 | End: 2019-03-10

## 2019-03-09 RX ADMIN — METFORMIN HYDROCHLORIDE 1000 MILLIGRAM(S): 850 TABLET ORAL at 20:50

## 2019-03-09 RX ADMIN — Medication 50 MILLIGRAM(S): at 20:49

## 2019-03-09 RX ADMIN — Medication 500 MILLIGRAM(S): at 20:49

## 2019-03-09 RX ADMIN — Medication 1 TABLET(S): at 08:32

## 2019-03-09 RX ADMIN — Medication 100 MILLIGRAM(S): at 08:32

## 2019-03-09 RX ADMIN — Medication 100 MILLIGRAM(S): at 20:50

## 2019-03-09 RX ADMIN — Medication 500 MILLIGRAM(S): at 15:43

## 2019-03-09 RX ADMIN — ARIPIPRAZOLE 30 MILLIGRAM(S): 15 TABLET ORAL at 20:49

## 2019-03-09 RX ADMIN — METFORMIN HYDROCHLORIDE 1000 MILLIGRAM(S): 850 TABLET ORAL at 08:32

## 2019-03-09 RX ADMIN — GABAPENTIN 600 MILLIGRAM(S): 400 CAPSULE ORAL at 08:32

## 2019-03-09 RX ADMIN — GABAPENTIN 600 MILLIGRAM(S): 400 CAPSULE ORAL at 20:50

## 2019-03-09 RX ADMIN — PANTOPRAZOLE SODIUM 40 MILLIGRAM(S): 20 TABLET, DELAYED RELEASE ORAL at 08:32

## 2019-03-09 RX ADMIN — ESCITALOPRAM OXALATE 20 MILLIGRAM(S): 10 TABLET, FILM COATED ORAL at 20:50

## 2019-03-09 RX ADMIN — AMLODIPINE BESYLATE 2.5 MILLIGRAM(S): 2.5 TABLET ORAL at 08:32

## 2019-03-09 NOTE — CHART NOTE - NSCHARTNOTEFT_GEN_A_CORE
URINE CULTURE INDICATED G-R  50-90K  PRE-HERNÁNDEZ    WILL START CIPRO  500 MG PO BID X 7 DAYS   C/W MOTRIN / TYL PRN

## 2019-03-10 RX ADMIN — GABAPENTIN 600 MILLIGRAM(S): 400 CAPSULE ORAL at 08:09

## 2019-03-10 RX ADMIN — AMLODIPINE BESYLATE 2.5 MILLIGRAM(S): 2.5 TABLET ORAL at 08:10

## 2019-03-10 RX ADMIN — Medication 500 MILLIGRAM(S): at 08:09

## 2019-03-10 RX ADMIN — GABAPENTIN 600 MILLIGRAM(S): 400 CAPSULE ORAL at 20:42

## 2019-03-10 RX ADMIN — Medication 400 MILLIGRAM(S): at 17:24

## 2019-03-10 RX ADMIN — Medication 50 MILLIGRAM(S): at 20:42

## 2019-03-10 RX ADMIN — Medication 100 MILLIGRAM(S): at 08:09

## 2019-03-10 RX ADMIN — ESCITALOPRAM OXALATE 20 MILLIGRAM(S): 10 TABLET, FILM COATED ORAL at 20:42

## 2019-03-10 RX ADMIN — Medication 1 TABLET(S): at 21:27

## 2019-03-10 RX ADMIN — Medication 1 TABLET(S): at 08:09

## 2019-03-10 RX ADMIN — Medication 100 MILLIGRAM(S): at 08:10

## 2019-03-10 RX ADMIN — Medication 650 MILLIGRAM(S): at 18:24

## 2019-03-10 RX ADMIN — Medication 400 MILLIGRAM(S): at 17:58

## 2019-03-10 RX ADMIN — Medication 50 MILLIGRAM(S): at 18:25

## 2019-03-10 RX ADMIN — METFORMIN HYDROCHLORIDE 1000 MILLIGRAM(S): 850 TABLET ORAL at 20:42

## 2019-03-10 RX ADMIN — METFORMIN HYDROCHLORIDE 1000 MILLIGRAM(S): 850 TABLET ORAL at 08:09

## 2019-03-10 RX ADMIN — Medication 100 MILLIGRAM(S): at 20:42

## 2019-03-10 RX ADMIN — PANTOPRAZOLE SODIUM 40 MILLIGRAM(S): 20 TABLET, DELAYED RELEASE ORAL at 08:09

## 2019-03-10 RX ADMIN — ARIPIPRAZOLE 30 MILLIGRAM(S): 15 TABLET ORAL at 20:42

## 2019-03-10 NOTE — CHART NOTE - NSCHARTNOTEFT_GEN_A_CORE
UCx reviewed, sensitivity with resistance to fluoroquinolones.  Will change to Bactrim DS 1 PO BID x 7 days.

## 2019-03-11 LAB
C TRACH RRNA SPEC QL NAA+PROBE: SIGNIFICANT CHANGE UP
N GONORRHOEA RRNA SPEC QL NAA+PROBE: SIGNIFICANT CHANGE UP
SPECIMEN SOURCE: SIGNIFICANT CHANGE UP

## 2019-03-11 RX ORDER — LACTOBACILLUS ACIDOPHILUS 100MM CELL
1 CAPSULE ORAL
Qty: 0 | Refills: 0 | Status: DISCONTINUED | OUTPATIENT
Start: 2019-03-11 | End: 2019-03-16

## 2019-03-11 RX ORDER — AMOXICILLIN 250 MG/5ML
500 SUSPENSION, RECONSTITUTED, ORAL (ML) ORAL EVERY 8 HOURS
Qty: 0 | Refills: 0 | Status: DISCONTINUED | OUTPATIENT
Start: 2019-03-11 | End: 2019-03-16

## 2019-03-11 RX ADMIN — GABAPENTIN 600 MILLIGRAM(S): 400 CAPSULE ORAL at 08:12

## 2019-03-11 RX ADMIN — ESCITALOPRAM OXALATE 20 MILLIGRAM(S): 10 TABLET, FILM COATED ORAL at 20:47

## 2019-03-11 RX ADMIN — METFORMIN HYDROCHLORIDE 1000 MILLIGRAM(S): 850 TABLET ORAL at 20:47

## 2019-03-11 RX ADMIN — PANTOPRAZOLE SODIUM 40 MILLIGRAM(S): 20 TABLET, DELAYED RELEASE ORAL at 08:13

## 2019-03-11 RX ADMIN — Medication 1 TABLET(S): at 08:12

## 2019-03-11 RX ADMIN — Medication 100 MILLIGRAM(S): at 08:12

## 2019-03-11 RX ADMIN — Medication 100 MILLIGRAM(S): at 20:47

## 2019-03-11 RX ADMIN — Medication 1 TABLET(S): at 08:11

## 2019-03-11 RX ADMIN — Medication 500 MILLIGRAM(S): at 20:47

## 2019-03-11 RX ADMIN — GABAPENTIN 600 MILLIGRAM(S): 400 CAPSULE ORAL at 20:47

## 2019-03-11 RX ADMIN — METFORMIN HYDROCHLORIDE 1000 MILLIGRAM(S): 850 TABLET ORAL at 08:11

## 2019-03-11 RX ADMIN — ARIPIPRAZOLE 30 MILLIGRAM(S): 15 TABLET ORAL at 20:47

## 2019-03-11 RX ADMIN — Medication 50 MILLIGRAM(S): at 20:47

## 2019-03-11 RX ADMIN — AMLODIPINE BESYLATE 2.5 MILLIGRAM(S): 2.5 TABLET ORAL at 08:12

## 2019-03-11 RX ADMIN — Medication 1 TABLET(S): at 20:47

## 2019-03-11 NOTE — CHART NOTE - NSCHARTNOTEFT_GEN_A_CORE
Patient started on Bactrim for UT Iand now reports pruritis, with some erythema.    PE Some erythema upper neck area, all other areas clear    -D/C bactrim  -Start Ampicillin 500mg Q 8H X 5 days with Probiotic.

## 2019-03-12 RX ADMIN — ESCITALOPRAM OXALATE 20 MILLIGRAM(S): 10 TABLET, FILM COATED ORAL at 20:50

## 2019-03-12 RX ADMIN — METFORMIN HYDROCHLORIDE 1000 MILLIGRAM(S): 850 TABLET ORAL at 09:51

## 2019-03-12 RX ADMIN — Medication 100 MILLIGRAM(S): at 20:50

## 2019-03-12 RX ADMIN — GABAPENTIN 600 MILLIGRAM(S): 400 CAPSULE ORAL at 09:51

## 2019-03-12 RX ADMIN — Medication 50 MILLIGRAM(S): at 20:50

## 2019-03-12 RX ADMIN — Medication 1 TABLET(S): at 09:51

## 2019-03-12 RX ADMIN — AMLODIPINE BESYLATE 2.5 MILLIGRAM(S): 2.5 TABLET ORAL at 09:51

## 2019-03-12 RX ADMIN — Medication 100 MILLIGRAM(S): at 09:52

## 2019-03-12 RX ADMIN — GABAPENTIN 600 MILLIGRAM(S): 400 CAPSULE ORAL at 20:50

## 2019-03-12 RX ADMIN — METFORMIN HYDROCHLORIDE 1000 MILLIGRAM(S): 850 TABLET ORAL at 20:50

## 2019-03-12 RX ADMIN — ARIPIPRAZOLE 30 MILLIGRAM(S): 15 TABLET ORAL at 20:50

## 2019-03-12 RX ADMIN — PANTOPRAZOLE SODIUM 40 MILLIGRAM(S): 20 TABLET, DELAYED RELEASE ORAL at 09:51

## 2019-03-12 RX ADMIN — Medication 500 MILLIGRAM(S): at 20:50

## 2019-03-12 RX ADMIN — Medication 100 MILLIGRAM(S): at 09:50

## 2019-03-12 RX ADMIN — Medication 500 MILLIGRAM(S): at 09:52

## 2019-03-13 RX ADMIN — Medication 1 TABLET(S): at 08:16

## 2019-03-13 RX ADMIN — Medication 100 MILLIGRAM(S): at 08:16

## 2019-03-13 RX ADMIN — Medication 500 MILLIGRAM(S): at 20:49

## 2019-03-13 RX ADMIN — PANTOPRAZOLE SODIUM 40 MILLIGRAM(S): 20 TABLET, DELAYED RELEASE ORAL at 08:16

## 2019-03-13 RX ADMIN — Medication 1 TABLET(S): at 16:07

## 2019-03-13 RX ADMIN — AMLODIPINE BESYLATE 2.5 MILLIGRAM(S): 2.5 TABLET ORAL at 08:16

## 2019-03-13 RX ADMIN — Medication 500 MILLIGRAM(S): at 08:16

## 2019-03-13 RX ADMIN — GABAPENTIN 600 MILLIGRAM(S): 400 CAPSULE ORAL at 08:16

## 2019-03-13 RX ADMIN — GABAPENTIN 600 MILLIGRAM(S): 400 CAPSULE ORAL at 20:49

## 2019-03-13 RX ADMIN — ARIPIPRAZOLE 30 MILLIGRAM(S): 15 TABLET ORAL at 20:50

## 2019-03-13 RX ADMIN — METFORMIN HYDROCHLORIDE 1000 MILLIGRAM(S): 850 TABLET ORAL at 20:50

## 2019-03-13 RX ADMIN — Medication 50 MILLIGRAM(S): at 20:50

## 2019-03-13 RX ADMIN — Medication 300 MILLIGRAM(S): at 17:17

## 2019-03-13 RX ADMIN — METFORMIN HYDROCHLORIDE 1000 MILLIGRAM(S): 850 TABLET ORAL at 08:16

## 2019-03-13 RX ADMIN — ESCITALOPRAM OXALATE 20 MILLIGRAM(S): 10 TABLET, FILM COATED ORAL at 20:49

## 2019-03-13 RX ADMIN — Medication 100 MILLIGRAM(S): at 20:50

## 2019-03-14 RX ADMIN — Medication 100 MILLIGRAM(S): at 09:38

## 2019-03-14 RX ADMIN — ESCITALOPRAM OXALATE 20 MILLIGRAM(S): 10 TABLET, FILM COATED ORAL at 20:45

## 2019-03-14 RX ADMIN — Medication 400 MILLIGRAM(S): at 20:47

## 2019-03-14 RX ADMIN — Medication 1 TABLET(S): at 16:06

## 2019-03-14 RX ADMIN — Medication 400 MILLIGRAM(S): at 09:38

## 2019-03-14 RX ADMIN — AMLODIPINE BESYLATE 2.5 MILLIGRAM(S): 2.5 TABLET ORAL at 09:39

## 2019-03-14 RX ADMIN — Medication 500 MILLIGRAM(S): at 09:38

## 2019-03-14 RX ADMIN — Medication 100 MILLIGRAM(S): at 20:45

## 2019-03-14 RX ADMIN — ARIPIPRAZOLE 30 MILLIGRAM(S): 15 TABLET ORAL at 20:45

## 2019-03-14 RX ADMIN — GABAPENTIN 600 MILLIGRAM(S): 400 CAPSULE ORAL at 20:45

## 2019-03-14 RX ADMIN — PANTOPRAZOLE SODIUM 40 MILLIGRAM(S): 20 TABLET, DELAYED RELEASE ORAL at 09:39

## 2019-03-14 RX ADMIN — Medication 100 MILLIGRAM(S): at 09:39

## 2019-03-14 RX ADMIN — METFORMIN HYDROCHLORIDE 1000 MILLIGRAM(S): 850 TABLET ORAL at 09:39

## 2019-03-14 RX ADMIN — Medication 1 TABLET(S): at 09:39

## 2019-03-14 RX ADMIN — METFORMIN HYDROCHLORIDE 1000 MILLIGRAM(S): 850 TABLET ORAL at 20:45

## 2019-03-14 RX ADMIN — GABAPENTIN 600 MILLIGRAM(S): 400 CAPSULE ORAL at 09:38

## 2019-03-14 RX ADMIN — Medication 1 TABLET(S): at 09:38

## 2019-03-14 RX ADMIN — Medication 500 MILLIGRAM(S): at 20:45

## 2019-03-14 RX ADMIN — Medication 50 MILLIGRAM(S): at 20:45

## 2019-03-15 VITALS — SYSTOLIC BLOOD PRESSURE: 121 MMHG | HEART RATE: 111 BPM | DIASTOLIC BLOOD PRESSURE: 66 MMHG

## 2019-03-15 RX ADMIN — METFORMIN HYDROCHLORIDE 1000 MILLIGRAM(S): 850 TABLET ORAL at 09:07

## 2019-03-15 RX ADMIN — GABAPENTIN 600 MILLIGRAM(S): 400 CAPSULE ORAL at 20:46

## 2019-03-15 RX ADMIN — Medication 500 MILLIGRAM(S): at 20:47

## 2019-03-15 RX ADMIN — Medication 1 TABLET(S): at 17:43

## 2019-03-15 RX ADMIN — METFORMIN HYDROCHLORIDE 1000 MILLIGRAM(S): 850 TABLET ORAL at 20:46

## 2019-03-15 RX ADMIN — Medication 100 MILLIGRAM(S): at 09:06

## 2019-03-15 RX ADMIN — Medication 1 TABLET(S): at 09:08

## 2019-03-15 RX ADMIN — Medication 500 MILLIGRAM(S): at 09:06

## 2019-03-15 RX ADMIN — ESCITALOPRAM OXALATE 20 MILLIGRAM(S): 10 TABLET, FILM COATED ORAL at 20:46

## 2019-03-15 RX ADMIN — Medication 1 TABLET(S): at 09:06

## 2019-03-15 RX ADMIN — Medication 50 MILLIGRAM(S): at 20:47

## 2019-03-15 RX ADMIN — GABAPENTIN 600 MILLIGRAM(S): 400 CAPSULE ORAL at 09:07

## 2019-03-15 RX ADMIN — PANTOPRAZOLE SODIUM 40 MILLIGRAM(S): 20 TABLET, DELAYED RELEASE ORAL at 09:08

## 2019-03-15 RX ADMIN — Medication 100 MILLIGRAM(S): at 20:47

## 2019-03-15 RX ADMIN — Medication 50 MILLIGRAM(S): at 17:43

## 2019-03-15 RX ADMIN — ARIPIPRAZOLE 30 MILLIGRAM(S): 15 TABLET ORAL at 20:46

## 2019-03-15 RX ADMIN — Medication 400 MILLIGRAM(S): at 09:06

## 2019-03-15 RX ADMIN — Medication 100 MILLIGRAM(S): at 20:46

## 2019-03-15 RX ADMIN — AMLODIPINE BESYLATE 2.5 MILLIGRAM(S): 2.5 TABLET ORAL at 09:07

## 2019-03-16 RX ORDER — ESCITALOPRAM OXALATE 10 MG/1
1 TABLET, FILM COATED ORAL
Qty: 0 | Refills: 0 | DISCHARGE
Start: 2019-03-16

## 2019-03-16 RX ORDER — GABAPENTIN 400 MG/1
1 CAPSULE ORAL
Qty: 0 | Refills: 0 | DISCHARGE
Start: 2019-03-16

## 2019-03-16 RX ORDER — GABAPENTIN 400 MG/1
600 CAPSULE ORAL
Qty: 0 | Refills: 0 | COMMUNITY

## 2019-03-16 RX ORDER — PROPYLTHIOURACIL 50 MG
2 TABLET ORAL
Qty: 0 | Refills: 0 | COMMUNITY
Start: 2019-03-16

## 2019-03-16 RX ORDER — ESCITALOPRAM OXALATE 10 MG/1
1 TABLET, FILM COATED ORAL
Qty: 0 | Refills: 0 | COMMUNITY
Start: 2019-03-16

## 2019-03-16 RX ORDER — PROPYLTHIOURACIL 50 MG
1 TABLET ORAL
Qty: 0 | Refills: 0 | DISCHARGE
Start: 2019-03-16

## 2019-03-16 RX ORDER — PROPYLTHIOURACIL 50 MG
100 TABLET ORAL
Qty: 0 | Refills: 0 | COMMUNITY

## 2019-03-16 RX ORDER — LACTOBACILLUS ACIDOPHILUS 100MM CELL
1 CAPSULE ORAL
Qty: 0 | Refills: 0 | COMMUNITY
Start: 2019-03-16

## 2019-03-16 RX ORDER — AMLODIPINE BESYLATE 2.5 MG/1
1 TABLET ORAL
Qty: 0 | Refills: 0 | COMMUNITY
Start: 2019-03-16

## 2019-03-16 RX ORDER — TOPIRAMATE 25 MG
1 TABLET ORAL
Qty: 0 | Refills: 0 | COMMUNITY

## 2019-03-16 RX ORDER — METFORMIN HYDROCHLORIDE 850 MG/1
1 TABLET ORAL
Qty: 0 | Refills: 0 | DISCHARGE
Start: 2019-03-16

## 2019-03-16 RX ORDER — ARIPIPRAZOLE 15 MG/1
1 TABLET ORAL
Qty: 0 | Refills: 0 | DISCHARGE
Start: 2019-03-16

## 2019-03-16 RX ORDER — TOPIRAMATE 25 MG
1 TABLET ORAL
Qty: 0 | Refills: 0 | COMMUNITY
Start: 2019-03-16

## 2019-03-16 RX ORDER — ARIPIPRAZOLE 15 MG/1
0 TABLET ORAL
Qty: 0 | Refills: 0 | DISCHARGE
Start: 2019-03-16

## 2019-03-16 RX ADMIN — METFORMIN HYDROCHLORIDE 1000 MILLIGRAM(S): 850 TABLET ORAL at 08:26

## 2019-03-16 RX ADMIN — AMLODIPINE BESYLATE 2.5 MILLIGRAM(S): 2.5 TABLET ORAL at 08:25

## 2019-03-16 RX ADMIN — Medication 1 TABLET(S): at 08:26

## 2019-03-16 RX ADMIN — Medication 1 TABLET(S): at 08:25

## 2019-03-16 RX ADMIN — Medication 100 MILLIGRAM(S): at 08:25

## 2019-03-16 RX ADMIN — PANTOPRAZOLE SODIUM 40 MILLIGRAM(S): 20 TABLET, DELAYED RELEASE ORAL at 08:25

## 2019-03-16 RX ADMIN — Medication 500 MILLIGRAM(S): at 08:25

## 2019-03-16 RX ADMIN — GABAPENTIN 600 MILLIGRAM(S): 400 CAPSULE ORAL at 08:25

## 2019-03-16 NOTE — CHART NOTE - NSCHARTNOTEFT_GEN_A_CORE
Allergies:  Bactrim (Pruritus)  BuSpar (Unknown)  clonidine (Unknown)  Lamictal (Unknown)  Nicotine Patch (Rash (Mild))      Diet: Regular    Activity: as tolerated    Follow up with    1. PMD in 2 weeks    2. Psych in 2 weeks    3.    Follow up for abnormal labs/tests    1.    Extra Instructions:      Flu Vaccine given  Yes_____         No______      Diagnosis:  Chemical Dependency   Maintain sobriety  refrain from all use      Patient Signature___________________________________________  Date_________________      Nurse Signature_____________________________________________Date_________________

## 2019-03-18 PROBLEM — I10 ESSENTIAL (PRIMARY) HYPERTENSION: Chronic | Status: ACTIVE | Noted: 2019-03-05

## 2019-03-21 ENCOUNTER — OUTPATIENT (OUTPATIENT)
Dept: OUTPATIENT SERVICES | Facility: HOSPITAL | Age: 29
LOS: 1 days | Discharge: HOME | End: 2019-03-21

## 2019-03-21 DIAGNOSIS — F11.20 OPIOID DEPENDENCE, UNCOMPLICATED: ICD-10-CM

## 2019-03-21 DIAGNOSIS — E03.9 HYPOTHYROIDISM, UNSPECIFIED: ICD-10-CM

## 2019-03-21 DIAGNOSIS — F31.81 BIPOLAR II DISORDER: ICD-10-CM

## 2019-03-21 DIAGNOSIS — J45.909 UNSPECIFIED ASTHMA, UNCOMPLICATED: ICD-10-CM

## 2019-03-21 DIAGNOSIS — E66.9 OBESITY, UNSPECIFIED: ICD-10-CM

## 2019-03-21 DIAGNOSIS — I10 ESSENTIAL (PRIMARY) HYPERTENSION: ICD-10-CM

## 2019-03-21 DIAGNOSIS — E28.2 POLYCYSTIC OVARIAN SYNDROME: ICD-10-CM

## 2019-03-21 DIAGNOSIS — Z71.6 TOBACCO ABUSE COUNSELING: ICD-10-CM

## 2019-03-21 DIAGNOSIS — Z91.5 PERSONAL HISTORY OF SELF-HARM: ICD-10-CM

## 2019-03-21 DIAGNOSIS — F43.10 POST-TRAUMATIC STRESS DISORDER, UNSPECIFIED: ICD-10-CM

## 2019-03-21 DIAGNOSIS — B18.2 CHRONIC VIRAL HEPATITIS C: ICD-10-CM

## 2019-03-21 DIAGNOSIS — F17.200 NICOTINE DEPENDENCE, UNSPECIFIED, UNCOMPLICATED: ICD-10-CM

## 2019-03-21 DIAGNOSIS — Z79.84 LONG TERM (CURRENT) USE OF ORAL HYPOGLYCEMIC DRUGS: ICD-10-CM

## 2019-03-21 DIAGNOSIS — F13.20 SEDATIVE, HYPNOTIC OR ANXIOLYTIC DEPENDENCE, UNCOMPLICATED: ICD-10-CM

## 2019-03-21 DIAGNOSIS — F14.20 COCAINE DEPENDENCE, UNCOMPLICATED: ICD-10-CM

## 2019-03-21 DIAGNOSIS — F14.24 COCAINE DEPENDENCE WITH COCAINE-INDUCED MOOD DISORDER: ICD-10-CM

## 2019-03-21 DIAGNOSIS — F14.29 COCAINE DEPENDENCE WITH UNSPECIFIED COCAINE-INDUCED DISORDER: ICD-10-CM

## 2019-04-04 ENCOUNTER — INPATIENT (INPATIENT)
Facility: HOSPITAL | Age: 29
LOS: 3 days | Discharge: NURSING, REHAB OR LONG-TERM CARE | End: 2019-04-08
Attending: INTERNAL MEDICINE | Admitting: INTERNAL MEDICINE
Payer: MEDICARE

## 2019-04-04 ENCOUNTER — EMERGENCY (EMERGENCY)
Facility: HOSPITAL | Age: 29
LOS: 0 days | Discharge: HOME | End: 2019-04-04
Attending: EMERGENCY MEDICINE | Admitting: INTERNAL MEDICINE
Payer: MEDICARE

## 2019-04-04 VITALS
HEART RATE: 103 BPM | OXYGEN SATURATION: 96 % | WEIGHT: 293 LBS | DIASTOLIC BLOOD PRESSURE: 89 MMHG | RESPIRATION RATE: 22 BRPM | SYSTOLIC BLOOD PRESSURE: 140 MMHG | TEMPERATURE: 97 F

## 2019-04-04 VITALS
SYSTOLIC BLOOD PRESSURE: 150 MMHG | HEART RATE: 94 BPM | OXYGEN SATURATION: 99 % | TEMPERATURE: 97 F | RESPIRATION RATE: 19 BRPM | DIASTOLIC BLOOD PRESSURE: 78 MMHG

## 2019-04-04 DIAGNOSIS — Z90.89 ACQUIRED ABSENCE OF OTHER ORGANS: Chronic | ICD-10-CM

## 2019-04-04 DIAGNOSIS — F11.10 OPIOID ABUSE, UNCOMPLICATED: ICD-10-CM

## 2019-04-04 DIAGNOSIS — E05.90 THYROTOXICOSIS, UNSPECIFIED WITHOUT THYROTOXIC CRISIS OR STORM: ICD-10-CM

## 2019-04-04 DIAGNOSIS — F90.9 ATTENTION-DEFICIT HYPERACTIVITY DISORDER, UNSPECIFIED TYPE: ICD-10-CM

## 2019-04-04 DIAGNOSIS — Z86.69 PERSONAL HISTORY OF OTHER DISEASES OF THE NERVOUS SYSTEM AND SENSE ORGANS: Chronic | ICD-10-CM

## 2019-04-04 LAB
ALBUMIN SERPL ELPH-MCNC: 3.3 G/DL — LOW (ref 3.5–5.2)
ALP SERPL-CCNC: 64 U/L — SIGNIFICANT CHANGE UP (ref 30–115)
ALT FLD-CCNC: 33 U/L — SIGNIFICANT CHANGE UP (ref 0–41)
AMPHET UR-MCNC: NEGATIVE — SIGNIFICANT CHANGE UP
ANION GAP SERPL CALC-SCNC: 9 MMOL/L — SIGNIFICANT CHANGE UP (ref 7–14)
APAP SERPL-MCNC: <5 UG/ML — LOW (ref 10–30)
APPEARANCE UR: CLEAR — SIGNIFICANT CHANGE UP
APPEARANCE UR: CLEAR — SIGNIFICANT CHANGE UP
AST SERPL-CCNC: 20 U/L — SIGNIFICANT CHANGE UP (ref 0–41)
BACTERIA # UR AUTO: ABNORMAL
BACTERIA # UR AUTO: ABNORMAL
BARBITURATES UR SCN-MCNC: NEGATIVE — SIGNIFICANT CHANGE UP
BASOPHILS # BLD AUTO: 0.03 K/UL — SIGNIFICANT CHANGE UP (ref 0–0.2)
BASOPHILS NFR BLD AUTO: 0.4 % — SIGNIFICANT CHANGE UP (ref 0–1)
BENZODIAZ UR-MCNC: NEGATIVE — SIGNIFICANT CHANGE UP
BILIRUB SERPL-MCNC: 0.3 MG/DL — SIGNIFICANT CHANGE UP (ref 0.2–1.2)
BILIRUB UR-MCNC: NEGATIVE — SIGNIFICANT CHANGE UP
BILIRUB UR-MCNC: NEGATIVE — SIGNIFICANT CHANGE UP
BUN SERPL-MCNC: 11 MG/DL — SIGNIFICANT CHANGE UP (ref 10–20)
CALCIUM SERPL-MCNC: 8.7 MG/DL — SIGNIFICANT CHANGE UP (ref 8.5–10.1)
CHLORIDE SERPL-SCNC: 106 MMOL/L — SIGNIFICANT CHANGE UP (ref 98–110)
CO2 SERPL-SCNC: 27 MMOL/L — SIGNIFICANT CHANGE UP (ref 17–32)
COCAINE METAB.OTHER UR-MCNC: POSITIVE
COD CRY URNS QL: NEGATIVE — SIGNIFICANT CHANGE UP
COLOR SPEC: YELLOW — SIGNIFICANT CHANGE UP
COLOR SPEC: YELLOW — SIGNIFICANT CHANGE UP
CREAT SERPL-MCNC: 0.7 MG/DL — SIGNIFICANT CHANGE UP (ref 0.7–1.5)
DIFF PNL FLD: ABNORMAL
DIFF PNL FLD: ABNORMAL
EOSINOPHIL # BLD AUTO: 0.47 K/UL — SIGNIFICANT CHANGE UP (ref 0–0.7)
EOSINOPHIL NFR BLD AUTO: 5.5 % — SIGNIFICANT CHANGE UP (ref 0–8)
EPI CELLS # UR: ABNORMAL /HPF
EPI CELLS # UR: ABNORMAL /HPF
ETHANOL SERPL-MCNC: <10 MG/DL — SIGNIFICANT CHANGE UP
GLUCOSE SERPL-MCNC: 117 MG/DL — HIGH (ref 70–99)
GLUCOSE UR QL: NEGATIVE MG/DL — SIGNIFICANT CHANGE UP
GLUCOSE UR QL: NEGATIVE MG/DL — SIGNIFICANT CHANGE UP
GRAN CASTS # UR COMP ASSIST: NEGATIVE — SIGNIFICANT CHANGE UP
HCG UR QL: NEGATIVE — SIGNIFICANT CHANGE UP
HCT VFR BLD CALC: 35.4 % — LOW (ref 37–47)
HGB BLD-MCNC: 11.5 G/DL — LOW (ref 12–16)
HYALINE CASTS # UR AUTO: NEGATIVE — SIGNIFICANT CHANGE UP
IMM GRANULOCYTES NFR BLD AUTO: 0.4 % — HIGH (ref 0.1–0.3)
KETONES UR-MCNC: NEGATIVE — SIGNIFICANT CHANGE UP
KETONES UR-MCNC: NEGATIVE — SIGNIFICANT CHANGE UP
LEUKOCYTE ESTERASE UR-ACNC: NEGATIVE — SIGNIFICANT CHANGE UP
LEUKOCYTE ESTERASE UR-ACNC: NEGATIVE — SIGNIFICANT CHANGE UP
LYMPHOCYTES # BLD AUTO: 2.63 K/UL — SIGNIFICANT CHANGE UP (ref 1.2–3.4)
LYMPHOCYTES # BLD AUTO: 31 % — SIGNIFICANT CHANGE UP (ref 20.5–51.1)
MCHC RBC-ENTMCNC: 26.9 PG — LOW (ref 27–31)
MCHC RBC-ENTMCNC: 32.5 G/DL — SIGNIFICANT CHANGE UP (ref 32–37)
MCV RBC AUTO: 82.9 FL — SIGNIFICANT CHANGE UP (ref 81–99)
METHADONE UR-MCNC: NEGATIVE — SIGNIFICANT CHANGE UP
MONOCYTES # BLD AUTO: 0.38 K/UL — SIGNIFICANT CHANGE UP (ref 0.1–0.6)
MONOCYTES NFR BLD AUTO: 4.5 % — SIGNIFICANT CHANGE UP (ref 1.7–9.3)
NEUTROPHILS # BLD AUTO: 4.94 K/UL — SIGNIFICANT CHANGE UP (ref 1.4–6.5)
NEUTROPHILS NFR BLD AUTO: 58.2 % — SIGNIFICANT CHANGE UP (ref 42.2–75.2)
NITRITE UR-MCNC: NEGATIVE — SIGNIFICANT CHANGE UP
NITRITE UR-MCNC: NEGATIVE — SIGNIFICANT CHANGE UP
NRBC # BLD: 0 /100 WBCS — SIGNIFICANT CHANGE UP (ref 0–0)
OPIATES UR-MCNC: POSITIVE
PCP SPEC-MCNC: SIGNIFICANT CHANGE UP
PCP SPEC-MCNC: SIGNIFICANT CHANGE UP
PH UR: 6 — SIGNIFICANT CHANGE UP (ref 5–8)
PH UR: 7 — SIGNIFICANT CHANGE UP (ref 5–8)
PLATELET # BLD AUTO: 290 K/UL — SIGNIFICANT CHANGE UP (ref 130–400)
POTASSIUM SERPL-MCNC: 3.8 MMOL/L — SIGNIFICANT CHANGE UP (ref 3.5–5)
POTASSIUM SERPL-SCNC: 3.8 MMOL/L — SIGNIFICANT CHANGE UP (ref 3.5–5)
PROPOXYPHENE QUALITATIVE URINE RESULT: NEGATIVE — SIGNIFICANT CHANGE UP
PROT SERPL-MCNC: 6.7 G/DL — SIGNIFICANT CHANGE UP (ref 6–8)
PROT UR-MCNC: ABNORMAL MG/DL
PROT UR-MCNC: NEGATIVE MG/DL — SIGNIFICANT CHANGE UP
RBC # BLD: 4.27 M/UL — SIGNIFICANT CHANGE UP (ref 4.2–5.4)
RBC # FLD: 15 % — HIGH (ref 11.5–14.5)
RBC CASTS # UR COMP ASSIST: ABNORMAL /HPF
RBC CASTS # UR COMP ASSIST: ABNORMAL /HPF
SALICYLATES SERPL-MCNC: <0.3 MG/DL — LOW (ref 4–30)
SODIUM SERPL-SCNC: 142 MMOL/L — SIGNIFICANT CHANGE UP (ref 135–146)
SP GR SPEC: 1.02 — SIGNIFICANT CHANGE UP (ref 1.01–1.03)
SP GR SPEC: 1.02 — SIGNIFICANT CHANGE UP (ref 1.01–1.03)
TRI-PHOS CRY UR QL COMP ASSIST: NEGATIVE — SIGNIFICANT CHANGE UP
URATE CRY FLD QL MICRO: NEGATIVE — SIGNIFICANT CHANGE UP
UROBILINOGEN FLD QL: 2 MG/DL (ref 0.2–0.2)
UROBILINOGEN FLD QL: 2 MG/DL (ref 0.2–0.2)
WBC # BLD: 8.48 K/UL — SIGNIFICANT CHANGE UP (ref 4.8–10.8)
WBC # FLD AUTO: 8.48 K/UL — SIGNIFICANT CHANGE UP (ref 4.8–10.8)
WBC UR QL: ABNORMAL /HPF
WBC UR QL: SIGNIFICANT CHANGE UP /HPF

## 2019-04-04 PROCEDURE — 71046 X-RAY EXAM CHEST 2 VIEWS: CPT | Mod: 26

## 2019-04-04 PROCEDURE — 99283 EMERGENCY DEPT VISIT LOW MDM: CPT | Mod: GC

## 2019-04-04 PROCEDURE — 99285 EMERGENCY DEPT VISIT HI MDM: CPT | Mod: GC

## 2019-04-04 RX ORDER — ACETAMINOPHEN 500 MG
650 TABLET ORAL EVERY 8 HOURS
Qty: 0 | Refills: 0 | Status: DISCONTINUED | OUTPATIENT
Start: 2019-04-04 | End: 2019-04-08

## 2019-04-04 RX ORDER — METHADONE HYDROCHLORIDE 40 MG/1
5 TABLET ORAL EVERY 12 HOURS
Qty: 0 | Refills: 0 | Status: DISCONTINUED | OUTPATIENT
Start: 2019-04-05 | End: 2019-04-07

## 2019-04-04 RX ORDER — GABAPENTIN 400 MG/1
600 CAPSULE ORAL
Qty: 0 | Refills: 0 | Status: DISCONTINUED | OUTPATIENT
Start: 2019-04-04 | End: 2019-04-08

## 2019-04-04 RX ORDER — AMLODIPINE BESYLATE 2.5 MG/1
10 TABLET ORAL DAILY
Qty: 0 | Refills: 0 | Status: DISCONTINUED | OUTPATIENT
Start: 2019-04-05 | End: 2019-04-08

## 2019-04-04 RX ORDER — PROPYLTHIOURACIL 50 MG
100 TABLET ORAL DAILY
Qty: 0 | Refills: 0 | Status: DISCONTINUED | OUTPATIENT
Start: 2019-04-05 | End: 2019-04-08

## 2019-04-04 RX ORDER — IBUPROFEN 200 MG
400 TABLET ORAL EVERY 12 HOURS
Qty: 0 | Refills: 0 | Status: DISCONTINUED | OUTPATIENT
Start: 2019-04-04 | End: 2019-04-08

## 2019-04-04 RX ORDER — ESCITALOPRAM OXALATE 10 MG/1
20 TABLET, FILM COATED ORAL DAILY
Qty: 0 | Refills: 0 | Status: DISCONTINUED | OUTPATIENT
Start: 2019-04-05 | End: 2019-04-05

## 2019-04-04 RX ORDER — PSEUDOEPHEDRINE HCL 30 MG
60 TABLET ORAL EVERY 6 HOURS
Qty: 0 | Refills: 0 | Status: DISCONTINUED | OUTPATIENT
Start: 2019-04-04 | End: 2019-04-08

## 2019-04-04 RX ORDER — TOPIRAMATE 25 MG
100 TABLET ORAL
Qty: 0 | Refills: 0 | Status: DISCONTINUED | OUTPATIENT
Start: 2019-04-04 | End: 2019-04-08

## 2019-04-04 RX ORDER — MAGNESIUM HYDROXIDE 400 MG/1
30 TABLET, CHEWABLE ORAL ONCE
Qty: 0 | Refills: 0 | Status: DISCONTINUED | OUTPATIENT
Start: 2019-04-04 | End: 2019-04-08

## 2019-04-04 RX ORDER — METHADONE HYDROCHLORIDE 40 MG/1
TABLET ORAL
Qty: 0 | Refills: 0 | Status: COMPLETED | OUTPATIENT
Start: 2019-04-04 | End: 2019-04-07

## 2019-04-04 RX ORDER — OMEPRAZOLE 10 MG/1
1 CAPSULE, DELAYED RELEASE ORAL
Qty: 0 | Refills: 0 | COMMUNITY

## 2019-04-04 RX ORDER — HYDROXYZINE HCL 10 MG
50 TABLET ORAL EVERY 6 HOURS
Qty: 0 | Refills: 0 | Status: DISCONTINUED | OUTPATIENT
Start: 2019-04-04 | End: 2019-04-04

## 2019-04-04 RX ORDER — METHOCARBAMOL 500 MG/1
500 TABLET, FILM COATED ORAL EVERY 6 HOURS
Qty: 0 | Refills: 0 | Status: DISCONTINUED | OUTPATIENT
Start: 2019-04-04 | End: 2019-04-08

## 2019-04-04 RX ORDER — PROPYLTHIOURACIL 50 MG
50 TABLET ORAL
Qty: 0 | Refills: 0 | Status: DISCONTINUED | OUTPATIENT
Start: 2019-04-04 | End: 2019-04-08

## 2019-04-04 RX ORDER — HYDROXYZINE HCL 10 MG
100 TABLET ORAL AT BEDTIME
Qty: 0 | Refills: 0 | Status: DISCONTINUED | OUTPATIENT
Start: 2019-04-04 | End: 2019-04-08

## 2019-04-04 RX ORDER — SODIUM FLUORIDE 1.1 G/100G
1 GEL ORAL
Qty: 0 | Refills: 0 | COMMUNITY

## 2019-04-04 RX ORDER — METHADONE HYDROCHLORIDE 40 MG/1
10 TABLET ORAL EVERY 12 HOURS
Qty: 0 | Refills: 0 | Status: DISCONTINUED | OUTPATIENT
Start: 2019-04-04 | End: 2019-04-05

## 2019-04-04 RX ORDER — GUAIFENESIN/DEXTROMETHORPHAN 600MG-30MG
5 TABLET, EXTENDED RELEASE 12 HR ORAL EVERY 4 HOURS
Qty: 0 | Refills: 0 | Status: DISCONTINUED | OUTPATIENT
Start: 2019-04-04 | End: 2019-04-08

## 2019-04-04 RX ORDER — HYDROXYZINE HCL 10 MG
25 TABLET ORAL EVERY 6 HOURS
Qty: 0 | Refills: 0 | Status: DISCONTINUED | OUTPATIENT
Start: 2019-04-04 | End: 2019-04-08

## 2019-04-04 RX ORDER — ARIPIPRAZOLE 15 MG/1
30 TABLET ORAL DAILY
Qty: 0 | Refills: 0 | Status: DISCONTINUED | OUTPATIENT
Start: 2019-04-05 | End: 2019-04-05

## 2019-04-04 RX ORDER — METFORMIN HYDROCHLORIDE 850 MG/1
1000 TABLET ORAL
Qty: 0 | Refills: 0 | Status: DISCONTINUED | OUTPATIENT
Start: 2019-04-04 | End: 2019-04-08

## 2019-04-04 RX ORDER — TOPIRAMATE 25 MG
1 TABLET ORAL
Qty: 0 | Refills: 0 | COMMUNITY

## 2019-04-04 RX ORDER — MULTIVIT-MIN/FERROUS GLUCONATE 9 MG/15 ML
1 LIQUID (ML) ORAL DAILY
Qty: 0 | Refills: 0 | Status: DISCONTINUED | OUTPATIENT
Start: 2019-04-04 | End: 2019-04-08

## 2019-04-04 RX ADMIN — Medication 100 MILLIGRAM(S): at 22:02

## 2019-04-04 RX ADMIN — GABAPENTIN 600 MILLIGRAM(S): 400 CAPSULE ORAL at 22:02

## 2019-04-04 RX ADMIN — Medication 50 MILLIGRAM(S): at 22:02

## 2019-04-04 RX ADMIN — METHADONE HYDROCHLORIDE 10 MILLIGRAM(S): 40 TABLET ORAL at 22:02

## 2019-04-04 RX ADMIN — METFORMIN HYDROCHLORIDE 1000 MILLIGRAM(S): 850 TABLET ORAL at 22:02

## 2019-04-04 NOTE — ED ADULT NURSE NOTE - NSIMPLEMENTINTERV_GEN_ALL_ED
Implemented All Universal Safety Interventions:  Early to call system. Call bell, personal items and telephone within reach. Instruct patient to call for assistance. Room bathroom lighting operational. Non-slip footwear when patient is off stretcher. Physically safe environment: no spills, clutter or unnecessary equipment. Stretcher in lowest position, wheels locked, appropriate side rails in place.

## 2019-04-04 NOTE — ED PROVIDER NOTE - ATTENDING CONTRIBUTION TO CARE
28y female polysubstance abuser seen earlier for vague SI and cleared by psych returns via EMS after presenting same thoughts to ALIX Mendoza at detox intake, no change from earlier visit, seen again by Dr Aguirre of psych, again is cleared, will admit to detox via ED, admin aware

## 2019-04-04 NOTE — H&P ADULT - NSICDXPASTMEDICALHX_GEN_ALL_CORE_FT
PAST MEDICAL HISTORY:  Adult ADHD     Cocaine abuse     Depression     Hepatitis C     HTN (hypertension)     Hyperthyroidism     Obesity     Occasional tremors     Opioid abuse     PCOS (polycystic ovarian syndrome)

## 2019-04-04 NOTE — ED BEHAVIORAL HEALTH NOTE - BEHAVIORAL HEALTH NOTE
Reviewed and referred to chart notes of this and prior visits.    pt reports that she is homeless and is staying at [facility]. she stopped taking medications and did not f/u with PHP and addiction program upon discharge from rehab two weeks ago. relapsed using cocaine and heroin at the same time. she feels bad about her relapse but not insightful to accept personal responsibility for her aftercare. requests a place to stay or rehab again. no psychosis. no s/h ideations. no cognitive issues.    long hx of cocaine, opiate dependency, multiple detox and rehab recent discharge to weeks ago. hx of noncompliance with aftercare. also has hx  of mood disorder and is on medication. non compliance.    alert ox 3 mood depressed, no psychosis no threat to self or others, i/j limited.    cocaine, opioid dependance / induced mood disorder.  r/o bipolar disorder    no indication or benefit of ipp  pt to f/u with her php appointment or go to intake for rehab evaluation.

## 2019-04-04 NOTE — ED PROVIDER NOTE - NS ED ROS FT
Review of Systems    Constitutional: (-) fever  Cardiovascular: (-) chest pain, (-) syncope  Respiratory: (-) cough, (-) shortness of breath  Gastrointestinal: (-) vomiting, (-) diarrhea, (-) abdominal pain  Musculoskeletal: (-) neck pain, (-) back pain, (-) joint pain  Integumentary: (-) rash, (-) edema  Neurological: (-) headache, (-) altered mental status  Psych: (+) SI (-) HI AVH  Except as documented in the HPI, all other systems are negative.

## 2019-04-04 NOTE — ED PROVIDER NOTE - CLINICAL SUMMARY MEDICAL DECISION MAKING FREE TEXT BOX
28y female with above PMH c/o feeling sad and frustrated after relapse, +vague passive SI, no halluc, no HI, on exam vital signs appreciated, calm and cooperative in ED, seen by Dr Aguirre of psych, does not require IPP, will d/c to f/u with outpatient MH

## 2019-04-04 NOTE — H&P ADULT - HISTORY OF PRESENT ILLNESS
27 y/o female recently completed Detox 3/6/19 the went to [facility]: completed treatment on 3/19/19 at [facility].  Presents to ER for Opiate Dependence.  Heroin IV since 19 y/o  - Heroin use 4 days/week usually but using daily for last 2 weeks.  -AVG use: 3 bags/day  -last used yesterday: 3 bags.    Crack cocaine since 19 y/o   --uses daily, $100./day, smokes    34 attempts at Detox in the past    - seen by Psychiatrist in ERr due to feeling sad, frustarted, with vagus passive suicidal ideations. Cleared by Psych for Detox admission. No need for IPP.

## 2019-04-04 NOTE — ED ADULT NURSE NOTE - PMH
Cocaine abuse    Depression    Diabetes    Hepatitis C    HTN (hypertension)    Hyperthyroidism    Opioid abuse    PCOS (polycystic ovarian syndrome)

## 2019-04-04 NOTE — H&P ADULT - NSICDXPASTSURGICALHX_GEN_ALL_CORE_FT
PAST SURGICAL HISTORY:  History of retinal tear bilateral, s/p laser Sx    History of uvulectomy     S/P tonsillectomy

## 2019-04-04 NOTE — ED PROVIDER NOTE - OBJECTIVE STATEMENT
28yF with PMH HTN, DM, HCV, heroin, cocaine, hypothyroid, PCOS, PCP Dr. Rubi p/w suicidal ideation without plan. no hi/avh. pt states she just was discharged from inpatient rehab here at [facility]. she has not taken her meds (abilify, lexapro, topamax, gabapentin) for 2 weeks due to relapse. pt states last heroin/cocaine use was yesterday. otherwise no physical complaints.

## 2019-04-04 NOTE — ED ADULT NURSE REASSESSMENT NOTE - NS ED NURSE REASSESS COMMENT FT1
pt cleared by psych. refused VS, all belongs with pt. pt is a&ox3, able to ambulate to lobby, family was at the bedside.

## 2019-04-04 NOTE — H&P ADULT - NSHPPHYSICALEXAM_GEN_ALL_CORE
PHYSICAL EXAM:    Vital Signs Last 24 Hrs    T(F): 97.9 (04-04-19 @ 14:39), Max: 97.9 (04-04-19 @ 14:39)  HR: 90 (04-04-19 @ 14:39) (90 - 103)  BP: 125/68 (04-04-19 @ 14:39)  RR: 18 (04-04-19 @ 14:39) (18 - 22)  SpO2: 97% (04-04-19 @ 14:39) (96% - 99%)    Constitutional: NAD, A&O x3    Eyes: PERRLA    Respiratory: +air entry, no rales, no rhonchi, no wheezes    Cardiovascular: +S1 and S2, regular rate and rhythm    Gastrointestinal: +BS, soft, non-tender, not distended, obesity    Extremities:  no edema, no calf tenderness    Vascular: +dorsal pedis and radial pulses, no extremity cyanosis    Neurological: sensation intact, ROM equal B/L, CN II-XII intact    Skin: no rashes, normal turgor

## 2019-04-04 NOTE — H&P ADULT - NSHPSOCIALHISTORY_GEN_ALL_CORE
Tobacco use: X 13 yrs, 1 PPD   EtOH use: Occasionally  Illicit drug use: Yes  Marital Status: Single

## 2019-04-04 NOTE — ED PROVIDER NOTE - PHYSICAL EXAMINATION
Vital Signs: I have reviewed the initial vital signs.  Constitutional: NAD, large body habitus, appears stated age, no acute distress.  HEENT: Airway patent, moist MM, no erythema/swelling/deformity of oral structures. EOMI, PERRLA.  CV: regular rate, regular rhythm, well-perfused extremities, 2+ b/l DP and radial pulses equal.  Lungs: BCTA, no increased WOB.  ABD: NTND, no guarding or rebound, no pulsatile mass, no hernias.   MSK: Neck supple, nontender, nl ROM, no stepoff. Chest nontender. Back nontender in TLS spine or to b/l bony structures or flanks. Ext nontender, nl rom, no deformity.   INTEG: Skin warm, dry, no rash.  NEURO: A&Ox3, moving all extremities, normal speech  PSYCH: tearful but cooperative, normal affect and interaction. +SI no HI/AVH

## 2019-04-04 NOTE — H&P ADULT - ASSESSMENT
27 y/o female admitted for Opiate dependence, evaluated by Psych in ER for vague suicidal ideations and cleared for Detox admission.

## 2019-04-04 NOTE — ED PROVIDER NOTE - PHYSICAL EXAMINATION
Vital Signs: I have reviewed the initial vital signs.  Constitutional: NAD, large body habitus, appears stated age, no acute distress.  HEENT: Airway patent, moist MM, no erythema/swelling/deformity of oral structures. EOMI, PERRLA.  CV: regular rate, regular rhythm, well-perfused extremities, 2+ b/l DP and radial pulses equal.  Lungs: BCTA, no increased WOB.  ABD: NTND, no guarding or rebound, no pulsatile mass, no hernias.   MSK: Neck supple, nontender, nl ROM, no stepoff. Chest nontender. Back nontender in TLS spine or to b/l bony structures or flanks. Ext nontender, nl rom, no deformity.   INTEG: Skin warm, dry, no rash.  NEURO: A&Ox3, moving all extremities, normal speech  PSYCH: tearful but cooperative, normal affect and interaction. +SI without plan (cleared by psych)

## 2019-04-04 NOTE — H&P ADULT - NSHPLABSRESULTS_GEN_ALL_CORE
11.5   8.48  )-----------( 290      ( 2019 14:35 )             35.4           142  |  106  |  11  ----------------------------<  117<H>  3.8   |  27  |  0.7    Ca    8.7      2019 14:35    TPro  6.7  /  Alb  3.3<L>  /  TBili  0.3  /  DBili  x   /  AST  20  /  ALT  33  /  AlkPhos  64                Urinalysis Basic - ( 2019 14:22 )    Color: Yellow / Appearance: Clear / S.020 / pH: x  Gluc: x / Ketone: Negative  / Bili: Negative / Urobili: 2.0 mg/dL   Blood: x / Protein: Trace mg/dL / Nitrite: Negative   Leuk Esterase: Negative / RBC: 6-10 /HPF / WBC 3-5 /HPF   Sq Epi: x / Non Sq Epi: Moderate /HPF / Bacteria: Moderate     Xray Chest 2 Views PA/Lat (19 @ 13:24) >    Impression:      No radiographic evidence of acute cardiopulmonary disease.

## 2019-04-04 NOTE — ED PROVIDER NOTE - NSFOLLOWUPCLINICS_GEN_ALL_ED_FT
[facility]  Detox Mgmt  [address] [place] [address]  Phone: [phone]  Fax:   Follow Up Time:     [facility]  [facility]  [address] [place] [address]  Phone: [phone]  Fax:   Follow Up Time:

## 2019-04-04 NOTE — ED ADULT NURSE NOTE - NSIMPLEMENTINTERV_GEN_ALL_ED
Implemented All Universal Safety Interventions:  [place] to call system. Call bell, personal items and telephone within reach. Instruct patient to call for assistance. Room bathroom lighting operational. Non-slip footwear when patient is off stretcher. Physically safe environment: no spills, clutter or unnecessary equipment. Stretcher in lowest position, wheels locked, appropriate side rails in place.

## 2019-04-04 NOTE — ED BEHAVIORAL HEALTH NOTE - BEHAVIORAL HEALTH NOTE
see below the evaluation done this morning. discussed with intake staff. pt reported suicidal ideation if not admitted and helped with program. sent to ed for reevaluation. father at bed side. pt's presentation is that she wants to get help and is frustrated and voicing her feelings. she wants help, futuristic and does not want to hurt herself ot others. educated and counselled about self responsibility to maintain aftercare if received appropriate inpatient care to stabilize and restart medication. shei is willing to accept and family is willing to support her.     plan: no need for ipp or suicidal watch. pt is not at risk of hurting herself or others.   cleared for detox admission and possible rehab.  restart following medication.     ydrOXYzine hydrochloride 50 mg oral tablet: 2 tab(s) orally once a day (at bedtime), As needed, insomnia, Last Dose Taken:    · 	hydrOXYzine hydrochloride 50 mg oral tablet: 1 tab(s) orally every 6 hours, As needed, Anxiety, Last Dose Taken:    · 	amLODIPine 2.5 mg oral tablet: 1 tab(s) orally once a day (at bedtime), Last Dose Taken:    · 	propylthiouracil 50 mg oral tablet: 1 tab(s) orally every 8 hours, Last Dose Taken:    · 	ARIPiprazole 30 mg oral tablet: 1 tab(s) orally once a day (at bedtime), Last Dose Taken:    · 	metFORMIN 500 mg oral tablet: 1 tab(s) orally 3 times a day, Last Dose Taken:    · 	escitalopram 20 mg oral tablet: 1 tab(s) orally once a day (at bedtime), Last Dose Taken:    · 	Neurontin 600 mg oral tablet: 600 milligram(s) orally 2 times a day, Last Dose Taken:    · 	propylthiouracil 50 mg oral tablet: 100 milligram(s) orally once a day, Last Dose Taken:    · 	omeprazole 20 mg oral delayed release capsule: 1 cap(s) orally once a day, Last Dose Taken:    · 	Topamax 100 mg oral tablet: 1 tab(s) orally 2 times a day, Last Dose Taken:      Discussed with Reji THOMSON and Pete HAWTHORNE.        Reviewed and referred to chart notes of this and prior visits.    pt reports that she is homeless and is staying at [facility]. she stopped taking medications and did not f/u with PHP and addiction program upon discharge from rehab two weeks ago. relapsed using cocaine and heroin at the same time. she feels bad about her relapse but not insightful to accept personal responsibility for her aftercare. requests a place to stay or rehab again. no psychosis. no s/h ideations. no cognitive issues.    long hx of cocaine, opiate dependency, multiple detox and rehab recent discharge to weeks ago. hx of noncompliance with aftercare. also has hx  of mood disorder and is on medication. non compliance.    alert ox 3 mood depressed, no psychosis no threat to self or others, i/j limited.    cocaine, opioid dependance / induced mood disorder.  r/o bipolar disorder    no indication or benefit of ipp  pt to f/u with her php appointment or go to intake for rehab evaluation.

## 2019-04-05 DIAGNOSIS — Z02.9 ENCOUNTER FOR ADMINISTRATIVE EXAMINATIONS, UNSPECIFIED: ICD-10-CM

## 2019-04-05 PROBLEM — E11.9 TYPE 2 DIABETES MELLITUS WITHOUT COMPLICATIONS: Chronic | Status: INACTIVE | Noted: 2018-06-17 | Resolved: 2019-04-04

## 2019-04-05 LAB
AMPHET UR-MCNC: NEGATIVE — SIGNIFICANT CHANGE UP
BARBITURATES UR SCN-MCNC: NEGATIVE — SIGNIFICANT CHANGE UP
BENZODIAZ UR-MCNC: NEGATIVE — SIGNIFICANT CHANGE UP
COCAINE METAB.OTHER UR-MCNC: POSITIVE
METHADONE UR-MCNC: NEGATIVE — SIGNIFICANT CHANGE UP
OPIATES UR-MCNC: POSITIVE
PROPOXYPHENE QUALITATIVE URINE RESULT: NEGATIVE — SIGNIFICANT CHANGE UP
T4 AB SER-ACNC: 12.1 UG/DL — HIGH (ref 4.6–12)
TSH SERPL-MCNC: <0.01 UIU/ML — LOW (ref 0.27–4.2)

## 2019-04-05 RX ORDER — ESCITALOPRAM OXALATE 10 MG/1
20 TABLET, FILM COATED ORAL AT BEDTIME
Qty: 0 | Refills: 0 | Status: DISCONTINUED | OUTPATIENT
Start: 2019-04-05 | End: 2019-04-08

## 2019-04-05 RX ORDER — ARIPIPRAZOLE 15 MG/1
30 TABLET ORAL AT BEDTIME
Qty: 0 | Refills: 0 | Status: DISCONTINUED | OUTPATIENT
Start: 2019-04-05 | End: 2019-04-08

## 2019-04-05 RX ADMIN — Medication 50 MILLIGRAM(S): at 21:06

## 2019-04-05 RX ADMIN — METHADONE HYDROCHLORIDE 5 MILLIGRAM(S): 40 TABLET ORAL at 21:06

## 2019-04-05 RX ADMIN — Medication 100 MILLIGRAM(S): at 21:06

## 2019-04-05 RX ADMIN — Medication 100 MILLIGRAM(S): at 09:23

## 2019-04-05 RX ADMIN — METFORMIN HYDROCHLORIDE 1000 MILLIGRAM(S): 850 TABLET ORAL at 09:23

## 2019-04-05 RX ADMIN — Medication 300 MILLIGRAM(S): at 14:21

## 2019-04-05 RX ADMIN — AMLODIPINE BESYLATE 10 MILLIGRAM(S): 2.5 TABLET ORAL at 09:23

## 2019-04-05 RX ADMIN — ESCITALOPRAM OXALATE 20 MILLIGRAM(S): 10 TABLET, FILM COATED ORAL at 21:05

## 2019-04-05 RX ADMIN — GABAPENTIN 600 MILLIGRAM(S): 400 CAPSULE ORAL at 21:05

## 2019-04-05 RX ADMIN — METHADONE HYDROCHLORIDE 10 MILLIGRAM(S): 40 TABLET ORAL at 09:24

## 2019-04-05 RX ADMIN — Medication 1 TABLET(S): at 09:23

## 2019-04-05 RX ADMIN — GABAPENTIN 600 MILLIGRAM(S): 400 CAPSULE ORAL at 09:23

## 2019-04-05 RX ADMIN — ARIPIPRAZOLE 30 MILLIGRAM(S): 15 TABLET ORAL at 21:05

## 2019-04-05 RX ADMIN — METFORMIN HYDROCHLORIDE 1000 MILLIGRAM(S): 850 TABLET ORAL at 21:06

## 2019-04-06 RX ADMIN — ESCITALOPRAM OXALATE 20 MILLIGRAM(S): 10 TABLET, FILM COATED ORAL at 20:30

## 2019-04-06 RX ADMIN — Medication 300 MILLIGRAM(S): at 12:05

## 2019-04-06 RX ADMIN — ARIPIPRAZOLE 30 MILLIGRAM(S): 15 TABLET ORAL at 20:30

## 2019-04-06 RX ADMIN — Medication 100 MILLIGRAM(S): at 08:43

## 2019-04-06 RX ADMIN — METHADONE HYDROCHLORIDE 5 MILLIGRAM(S): 40 TABLET ORAL at 20:30

## 2019-04-06 RX ADMIN — GABAPENTIN 600 MILLIGRAM(S): 400 CAPSULE ORAL at 08:43

## 2019-04-06 RX ADMIN — METHADONE HYDROCHLORIDE 5 MILLIGRAM(S): 40 TABLET ORAL at 08:43

## 2019-04-06 RX ADMIN — Medication 25 MILLIGRAM(S): at 12:07

## 2019-04-06 RX ADMIN — GABAPENTIN 600 MILLIGRAM(S): 400 CAPSULE ORAL at 20:30

## 2019-04-06 RX ADMIN — AMLODIPINE BESYLATE 10 MILLIGRAM(S): 2.5 TABLET ORAL at 08:42

## 2019-04-06 RX ADMIN — METFORMIN HYDROCHLORIDE 1000 MILLIGRAM(S): 850 TABLET ORAL at 10:04

## 2019-04-06 RX ADMIN — Medication 50 MILLIGRAM(S): at 20:30

## 2019-04-06 RX ADMIN — Medication 1 TABLET(S): at 08:42

## 2019-04-06 RX ADMIN — METFORMIN HYDROCHLORIDE 1000 MILLIGRAM(S): 850 TABLET ORAL at 20:30

## 2019-04-06 RX ADMIN — Medication 100 MILLIGRAM(S): at 20:30

## 2019-04-06 NOTE — CHART NOTE - NSCHARTNOTEFT_GEN_A_CORE
Subsequent Inpatient Encounter                                       Detox Unit    LYNDON SMITH   28y   Female      Chief Complaint:    Follow up for Opiate  Dependency    HPI:     I reviewed previous notes. No Change, except if noted below.             Detail:_    ROS:   I reviewed with patient.  No changes from previous notes except if noted below.             Detail: _    PFSH I reviewed with patient. No changes from previous notes except if noted below.             Detail_    Medication reconciliation performed.    MEDICATIONS  (STANDING):  amLODIPine   Tablet 10 milliGRAM(s) Oral daily  ARIPiprazole 30 milliGRAM(s) Oral at bedtime  escitalopram 20 milliGRAM(s) Oral at bedtime  gabapentin 600 milliGRAM(s) Oral two times a day  metFORMIN 1000 milliGRAM(s) Oral two times a day  methadone    Tablet   Oral   methadone    Tablet 5 milliGRAM(s) Oral every 12 hours  multivitamin/minerals 1 Tablet(s) Oral daily  propylthiouracil 50 milliGRAM(s) Oral <User Schedule>  propylthiouracil 100 milliGRAM(s) Oral daily  topiramate 100 milliGRAM(s) Oral two times a day      MEDICATIONS  (PRN):  acetaminophen   Tablet .. 650 milliGRAM(s) Oral every 8 hours PRN Mild Pain (1 - 3)  aluminum hydroxide/magnesium hydroxide/simethicone Suspension 30 milliLiter(s) Oral every 6 hours PRN Heartburn  bismuth subsalicylate Liquid 30 milliLiter(s) Oral every 6 hours PRN Diarrhea  guaiFENesin/dextromethorphan  Syrup 5 milliLiter(s) Oral every 4 hours PRN Cough  hydrOXYzine hydrochloride 100 milliGRAM(s) Oral at bedtime PRN insomnia  hydrOXYzine hydrochloride 25 milliGRAM(s) Oral every 6 hours PRN Anxiety  ibuprofen  Tablet. 400 milliGRAM(s) Oral every 12 hours PRN Mild Pain (1 - 3)  magnesium hydroxide Suspension 30 milliLiter(s) Oral once PRN Constipation  methocarbamol 500 milliGRAM(s) Oral every 6 hours PRN muscle pain  pseudoephedrine 60 milliGRAM(s) Oral every 6 hours PRN Rhinitis  trimethobenzamide 300 milliGRAM(s) Oral every 6 hours PRN Nausea and/or Vomiting  trimethobenzamide Injectable 200 milliGRAM(s) IntraMuscular every 6 hours PRN Nausea and/or Vomiting      T(C): 36.5 (19 @ 06:00), Max: 36.5 (19 @ 06:00)  HR: 70 (19 @ 06:00) (70 - 88)  BP: 120/57 (19 @ 06:00) (103/55 - 132/82)  RR: 18 (19 @ 06:00) (14 - 18)  SpO2: --    PHYSICAL EXAM:      Constitutional: NAD, A&O x3    Eyes: PERRLA, no conjuctivitis    Neck: no lymphadenopathy    Respiratory: +air entry, no rales, no rhonchi, no wheezes    Cardiovascular: +S1 and S2, regular rate and rhythm    Gastrointestinal: +BS, soft, non-tender, not distended    Extremities:  no edema, no calf tenderness    Skin: no rashes, normal turgor                            11.5   8.48  )-----------( 290      ( 2019 14:35 )             35.4       142  |  106  |  11  ----------------------------<  117<H>  3.8   |  27  |  0.7    Ca    8.7      2019 14:35    TPro  6.7  /  Alb  3.3<L>  /  TBili  0.3  /  DBili  x   /  AST  20  /  ALT  33  /  AlkPhos  64            Urinalysis Basic - ( 2019 14:22 )    Color: Yellow / Appearance: Clear / S.020 / pH: x  Gluc: x / Ketone: Negative  / Bili: Negative / Urobili: 2.0 mg/dL   Blood: x / Protein: Trace mg/dL / Nitrite: Negative   Leuk Esterase: Negative / RBC: 6-10 /HPF / WBC 3-5 /HPF   Sq Epi: x / Non Sq Epi: Moderate /HPF / Bacteria: Moderate          Impression and Plan:    Primary Diagnosis:  Opiate Dependency                                Medication: Methadone Protocol    Secondary Diagnosis:     DM                                                             Medication: on meds    Tertiary Diagnosis:          Anxiety/depression                                                             Medication on meds      Continue Detox Protocols. Use of PRNS as needed for withdrawal and comfort.    Adjustments to protocols:    Labs/ Tests reviewed.    Tests ordered:     Likely Disposition: _X__Home       ___Rehab       ___Outpatient Program    ___Self Help     _____Other    Estimated Length of stay:__4__

## 2019-04-06 NOTE — CHART NOTE - NSCHARTNOTEFT_GEN_A_CORE
Allergies:  Bactrim (Pruritus; Rash)  BuSpar (Rash)  clonidine (Other (Mild))  Lamictal (Rash)  Nicotine Patch (Rash (Mild))      Diet: Regular    Activity: as tolerated    Follow up with    1. PMD in 2 weeks    2. Psych in 2 weeks    3.    Follow up for abnormal labs/tests    1.    Extra Instructions:      Flu Vaccine given  Yes_____         No______      Diagnosis:  Chemical Dependency   Maintain sobriety  refrain from all use      Patient Signature___________________________________________  Date_________________      Nurse Signature_____________________________________________Date_________________

## 2019-04-06 NOTE — CONSULT NOTE ADULT - SUBJECTIVE AND OBJECTIVE BOX
psych consult on 4/5/19 reviewed, pt seen and discussed  with staff    28 year old female with a long h/o poly substance abuse , cocaine , Opoid presented to ED  with passive suicidal ideation if not admitted and helped with program.. pt's presentation is that she wants to get help and is frustrated and voicing her feelings. she wants help, futuristic and does not want to hurt herself ot others. educated and counselled about self responsibility to maintain aftercare if received appropriate inpatient care to stabilize and restart medication. shei is willing to accept and family is willing to support her. She states that she wants to complete partial day program  and her father will allow her stay  with him , if she continue her  program.     pt reports that she is homeless and is staying at [facility]. she stopped taking medications and did not f/u with PHP and addiction program upon discharge from rehab two weeks ago. relapsed using cocaine and heroin at the same time. she feels bad about her relapse but not insightful to accept personal responsibility for her aftercare. requests a place to stay or rehab again. no psychosis. no s/h ideations. no cognitive issues.      plan: no need for ipp or suicidal watch. pt is not at risk of hurting herself or others.   cleared for detox and further rehab .  continue following medication.     hydroxyzine  hydrochloride 50 mg oral tablet: 2 tab(s) orally once a day (at bedtime), As needed, insomnia, Last Dose Taken:    · 	hydrOXYzine hydrochloride 50 mg oral tablet: 1 tab(s) orally every 6 hours, As needed, Anxiety, Last Dose Taken:    · 	amLODIPine 2.5 mg oral tablet: 1 tab(s) orally once a day (at bedtime), Last Dose Taken:    · 	propylthiouracil 50 mg oral tablet: 1 tab(s) orally every 8 hours, Last Dose Taken:    · 	ARIPiprazole 30 mg oral tablet: 1 tab(s) orally once a day (at bedtime), Last Dose Taken:    · 	metFORMIN 500 mg oral tablet: 1 tab(s) orally 3 times a day, Last Dose Taken:    · 	escitalopram 20 mg oral tablet: 1 tab(s) orally once a day (at bedtime), Last Dose Taken:    · 	Neurontin 600 mg oral tablet: 600 milligram(s) orally 2 times a day, Last Dose Taken:    · 	propylthiouracil 50 mg oral tablet: 100 milligram(s) orally once a day, Last Dose Taken:    · 	omeprazole 20 mg oral delayed release capsule: 1 cap(s) orally once a day, Last Dose Taken:    · 	Topamax 100 mg oral tablet: 1 tab(s) orally 2 times a day, Last Dose Taken:      Discussed with the nurse .           long hx of cocaine, opiate dependency, multiple detox and rehab recent discharge to weeks ago. hx of noncompliance with aftercare. also has hx  of mood disorder and is on medication. non compliance.  cocaine, opioid dependance / induced mood disorder.  r/o bipolar disorder    no indication or benefit of ipp  pt to f/u with her php appointment or go to intake for rehab evaluation.  Discharge planning team

## 2019-04-07 RX ADMIN — ESCITALOPRAM OXALATE 20 MILLIGRAM(S): 10 TABLET, FILM COATED ORAL at 20:58

## 2019-04-07 RX ADMIN — Medication 100 MILLIGRAM(S): at 08:28

## 2019-04-07 RX ADMIN — Medication 30 MILLILITER(S): at 15:54

## 2019-04-07 RX ADMIN — ARIPIPRAZOLE 30 MILLIGRAM(S): 15 TABLET ORAL at 20:58

## 2019-04-07 RX ADMIN — Medication 100 MILLIGRAM(S): at 20:58

## 2019-04-07 RX ADMIN — METFORMIN HYDROCHLORIDE 1000 MILLIGRAM(S): 850 TABLET ORAL at 08:28

## 2019-04-07 RX ADMIN — Medication 650 MILLIGRAM(S): at 15:53

## 2019-04-07 RX ADMIN — AMLODIPINE BESYLATE 10 MILLIGRAM(S): 2.5 TABLET ORAL at 08:28

## 2019-04-07 RX ADMIN — Medication 1 TABLET(S): at 08:28

## 2019-04-07 RX ADMIN — METHADONE HYDROCHLORIDE 5 MILLIGRAM(S): 40 TABLET ORAL at 08:29

## 2019-04-07 RX ADMIN — Medication 50 MILLIGRAM(S): at 20:58

## 2019-04-07 RX ADMIN — METFORMIN HYDROCHLORIDE 1000 MILLIGRAM(S): 850 TABLET ORAL at 20:58

## 2019-04-07 RX ADMIN — GABAPENTIN 600 MILLIGRAM(S): 400 CAPSULE ORAL at 08:28

## 2019-04-07 RX ADMIN — GABAPENTIN 600 MILLIGRAM(S): 400 CAPSULE ORAL at 20:58

## 2019-04-07 NOTE — CHART NOTE - NSCHARTNOTEFT_GEN_A_CORE
Subsequent Inpatient Encounter                                       Detox Unit    LYNDON SMITH   28y   Female      Chief Complaint:    Follow up for Opiate  Dependency    HPI:     I reviewed previous notes. No Change, except if noted below.             Detail:_    ROS:   I reviewed with patient.  No changes from previous notes except if noted below.             Detail: _    PFSH I reviewed with patient. No changes from previous notes except if noted below.             Detail_    Medication reconciliation performed.    MEDICATIONS  (STANDING):  amLODIPine   Tablet 10 milliGRAM(s) Oral daily  ARIPiprazole 30 milliGRAM(s) Oral at bedtime  escitalopram 20 milliGRAM(s) Oral at bedtime  gabapentin 600 milliGRAM(s) Oral two times a day  metFORMIN 1000 milliGRAM(s) Oral two times a day  methadone    Tablet   Oral   methadone    Tablet 5 milliGRAM(s) Oral every 12 hours  multivitamin/minerals 1 Tablet(s) Oral daily  propylthiouracil 50 milliGRAM(s) Oral <User Schedule>  propylthiouracil 100 milliGRAM(s) Oral daily  topiramate 100 milliGRAM(s) Oral two times a day      MEDICATIONS  (PRN):  acetaminophen   Tablet .. 650 milliGRAM(s) Oral every 8 hours PRN Mild Pain (1 - 3)  aluminum hydroxide/magnesium hydroxide/simethicone Suspension 30 milliLiter(s) Oral every 6 hours PRN Heartburn  bismuth subsalicylate Liquid 30 milliLiter(s) Oral every 6 hours PRN Diarrhea  guaiFENesin/dextromethorphan  Syrup 5 milliLiter(s) Oral every 4 hours PRN Cough  hydrOXYzine hydrochloride 100 milliGRAM(s) Oral at bedtime PRN insomnia  hydrOXYzine hydrochloride 25 milliGRAM(s) Oral every 6 hours PRN Anxiety  ibuprofen  Tablet. 400 milliGRAM(s) Oral every 12 hours PRN Mild Pain (1 - 3)  magnesium hydroxide Suspension 30 milliLiter(s) Oral once PRN Constipation  methocarbamol 500 milliGRAM(s) Oral every 6 hours PRN muscle pain  pseudoephedrine 60 milliGRAM(s) Oral every 6 hours PRN Rhinitis  trimethobenzamide 300 milliGRAM(s) Oral every 6 hours PRN Nausea and/or Vomiting  trimethobenzamide Injectable 200 milliGRAM(s) IntraMuscular every 6 hours PRN Nausea and/or Vomiting      T(C): 36.5 (19 @ 06:00), Max: 36.5 (19 @ 06:00)  HR: 70 (19 @ 06:00) (70 - 88)  BP: 120/57 (19 @ 06:00) (103/55 - 132/82)  RR: 18 (19 @ 06:00) (14 - 18)  SpO2: --    PHYSICAL EXAM:      Constitutional: NAD, A&O x3    Eyes: PERRLA, no conjuctivitis    Neck: no lymphadenopathy    Respiratory: +air entry, no rales, no rhonchi, no wheezes    Cardiovascular: +S1 and S2, regular rate and rhythm    Gastrointestinal: +BS, soft, non-tender, not distended    Extremities:  no edema, no calf tenderness    Skin: no rashes, normal turgor                            11.5   8.48  )-----------( 290      ( 2019 14:35 )             35.4       142  |  106  |  11  ----------------------------<  117<H>  3.8   |  27  |  0.7    Ca    8.7      2019 14:35    TPro  6.7  /  Alb  3.3<L>  /  TBili  0.3  /  DBili  x   /  AST  20  /  ALT  33  /  AlkPhos  64            Urinalysis Basic - ( 2019 14:22 )    Color: Yellow / Appearance: Clear / S.020 / pH: x  Gluc: x / Ketone: Negative  / Bili: Negative / Urobili: 2.0 mg/dL   Blood: x / Protein: Trace mg/dL / Nitrite: Negative   Leuk Esterase: Negative / RBC: 6-10 /HPF / WBC 3-5 /HPF   Sq Epi: x / Non Sq Epi: Moderate /HPF / Bacteria: Moderate          Impression and Plan:    Primary Diagnosis:  Opiate Dependency                                Medication: Methadone Protocol completed.  Needs bed at [facility] none available today.  Will hold discharge until AM    Secondary Diagnosis:     DM                                                             Medication: on meds    Tertiary Diagnosis:          Anxiety/depression                                                             Medication on meds      Continue Detox Protocols. Use of PRNS as needed for withdrawal and comfort.    Adjustments to protocols:    Labs/ Tests reviewed.    Tests ordered:     Likely Disposition: _X__Home       ___Rehab       ___Outpatient Program    ___Self Help     _____Other    Estimated Length of stay:__4__ Subsequent Inpatient Encounter                                       Detox Unit    LYNDON SMITH   28y   Female      Chief Complaint:    Follow up for Opiate  Dependency    HPI:     I reviewed previous notes. No Change, except if noted below.             Detail:_    ROS:   I reviewed with patient.  No changes from previous notes except if noted below.             Detail: _    PFSH I reviewed with patient. No changes from previous notes except if noted below.             Detail_    Medication reconciliation performed.    MEDICATIONS  (STANDING):  amLODIPine   Tablet 10 milliGRAM(s) Oral daily  ARIPiprazole 30 milliGRAM(s) Oral at bedtime  escitalopram 20 milliGRAM(s) Oral at bedtime  gabapentin 600 milliGRAM(s) Oral two times a day  metFORMIN 1000 milliGRAM(s) Oral two times a day  methadone    Tablet   Oral   methadone    Tablet 5 milliGRAM(s) Oral every 12 hours  multivitamin/minerals 1 Tablet(s) Oral daily  propylthiouracil 50 milliGRAM(s) Oral <User Schedule>  propylthiouracil 100 milliGRAM(s) Oral daily  topiramate 100 milliGRAM(s) Oral two times a day      MEDICATIONS  (PRN):  acetaminophen   Tablet .. 650 milliGRAM(s) Oral every 8 hours PRN Mild Pain (1 - 3)  aluminum hydroxide/magnesium hydroxide/simethicone Suspension 30 milliLiter(s) Oral every 6 hours PRN Heartburn  bismuth subsalicylate Liquid 30 milliLiter(s) Oral every 6 hours PRN Diarrhea  guaiFENesin/dextromethorphan  Syrup 5 milliLiter(s) Oral every 4 hours PRN Cough  hydrOXYzine hydrochloride 100 milliGRAM(s) Oral at bedtime PRN insomnia  hydrOXYzine hydrochloride 25 milliGRAM(s) Oral every 6 hours PRN Anxiety  ibuprofen  Tablet. 400 milliGRAM(s) Oral every 12 hours PRN Mild Pain (1 - 3)  magnesium hydroxide Suspension 30 milliLiter(s) Oral once PRN Constipation  methocarbamol 500 milliGRAM(s) Oral every 6 hours PRN muscle pain  pseudoephedrine 60 milliGRAM(s) Oral every 6 hours PRN Rhinitis  trimethobenzamide 300 milliGRAM(s) Oral every 6 hours PRN Nausea and/or Vomiting  trimethobenzamide Injectable 200 milliGRAM(s) IntraMuscular every 6 hours PRN Nausea and/or Vomiting      T(C): 36.5 (19 @ 06:00), Max: 36.5 (19 @ 06:00)  HR: 70 (19 @ 06:00) (70 - 88)  BP: 120/57 (19 @ 06:00) (103/55 - 132/82)  RR: 18 (19 @ 06:00) (14 - 18)  SpO2: --    PHYSICAL EXAM:      Constitutional: NAD, A&O x3    Eyes: PERRLA, no conjuctivitis    Neck: no lymphadenopathy    Respiratory: +air entry, no rales, no rhonchi, no wheezes    Cardiovascular: +S1 and S2, regular rate and rhythm    Gastrointestinal: +BS, soft, non-tender, not distended    Extremities:  no edema, no calf tenderness    Skin: no rashes, normal turgor                            11.5   8.48  )-----------( 290      ( 2019 14:35 )             35.4       142  |  106  |  11  ----------------------------<  117<H>  3.8   |  27  |  0.7    Ca    8.7      2019 14:35    TPro  6.7  /  Alb  3.3<L>  /  TBili  0.3  /  DBili  x   /  AST  20  /  ALT  33  /  AlkPhos  64            Urinalysis Basic - ( 2019 14:22 )    Color: Yellow / Appearance: Clear / S.020 / pH: x  Gluc: x / Ketone: Negative  / Bili: Negative / Urobili: 2.0 mg/dL   Blood: x / Protein: Trace mg/dL / Nitrite: Negative   Leuk Esterase: Negative / RBC: 6-10 /HPF / WBC 3-5 /HPF   Sq Epi: x / Non Sq Epi: Moderate /HPF / Bacteria: Moderate          Impression and Plan:    Primary Diagnosis:  Opiate Dependency                                Medication: Methadone Protocol completed.  Needs bed at [facility] none available today.  Will hold discharge until AM    Secondary Diagnosis:     DM                                                             Medication: on meds    Tertiary Diagnosis:          Anxiety/depression                                                             Medication on meds      Continue Detox Protocols. Use of PRNS as needed for withdrawal and comfort.    Adjustments to protocols:    Labs/ Tests reviewed.    Tests ordered:     Likely Disposition: ___Home       _x__Rehab       ___Outpatient Program    ___Self Help     _____Other    Estimated Length of stay:__4__

## 2019-04-08 VITALS
TEMPERATURE: 98 F | HEART RATE: 92 BPM | DIASTOLIC BLOOD PRESSURE: 56 MMHG | RESPIRATION RATE: 16 BRPM | SYSTOLIC BLOOD PRESSURE: 98 MMHG

## 2019-04-08 RX ADMIN — METFORMIN HYDROCHLORIDE 1000 MILLIGRAM(S): 850 TABLET ORAL at 08:49

## 2019-04-08 RX ADMIN — Medication 100 MILLIGRAM(S): at 08:49

## 2019-04-08 RX ADMIN — Medication 1 TABLET(S): at 08:49

## 2019-04-08 RX ADMIN — GABAPENTIN 600 MILLIGRAM(S): 400 CAPSULE ORAL at 08:49

## 2019-04-08 RX ADMIN — AMLODIPINE BESYLATE 10 MILLIGRAM(S): 2.5 TABLET ORAL at 08:50

## 2019-04-08 NOTE — CHART NOTE - NSCHARTNOTEFT_GEN_A_CORE
Subsequent Inpatient Encounter                                       Detox Unit    LYNDON SMITH   28y   Female      Chief Complaint:    Follow up for Opiate  Dependency    HPI:     I reviewed previous notes. No Change, except if noted below.             Detail:_    ROS:   I reviewed with patient.  No changes from previous notes except if noted below.             Detail: _    PFSH I reviewed with patient. No changes from previous notes except if noted below.             Detail_    Medication reconciliation performed.    MEDICATIONS  (STANDING):  amLODIPine   Tablet 10 milliGRAM(s) Oral daily  ARIPiprazole 30 milliGRAM(s) Oral at bedtime  escitalopram 20 milliGRAM(s) Oral at bedtime  gabapentin 600 milliGRAM(s) Oral two times a day  metFORMIN 1000 milliGRAM(s) Oral two times a day  methadone    Tablet   Oral   methadone    Tablet 5 milliGRAM(s) Oral every 12 hours  multivitamin/minerals 1 Tablet(s) Oral daily  propylthiouracil 50 milliGRAM(s) Oral <User Schedule>  propylthiouracil 100 milliGRAM(s) Oral daily  topiramate 100 milliGRAM(s) Oral two times a day      MEDICATIONS  (PRN):  acetaminophen   Tablet .. 650 milliGRAM(s) Oral every 8 hours PRN Mild Pain (1 - 3)  aluminum hydroxide/magnesium hydroxide/simethicone Suspension 30 milliLiter(s) Oral every 6 hours PRN Heartburn  bismuth subsalicylate Liquid 30 milliLiter(s) Oral every 6 hours PRN Diarrhea  guaiFENesin/dextromethorphan  Syrup 5 milliLiter(s) Oral every 4 hours PRN Cough  hydrOXYzine hydrochloride 100 milliGRAM(s) Oral at bedtime PRN insomnia  hydrOXYzine hydrochloride 25 milliGRAM(s) Oral every 6 hours PRN Anxiety  ibuprofen  Tablet. 400 milliGRAM(s) Oral every 12 hours PRN Mild Pain (1 - 3)  magnesium hydroxide Suspension 30 milliLiter(s) Oral once PRN Constipation  methocarbamol 500 milliGRAM(s) Oral every 6 hours PRN muscle pain  pseudoephedrine 60 milliGRAM(s) Oral every 6 hours PRN Rhinitis  trimethobenzamide 300 milliGRAM(s) Oral every 6 hours PRN Nausea and/or Vomiting  trimethobenzamide Injectable 200 milliGRAM(s) IntraMuscular every 6 hours PRN Nausea and/or Vomiting      T(C): 36.5 (19 @ 06:00), Max: 36.5 (19 @ 06:00)  HR: 70 (19 @ 06:00) (70 - 88)  BP: 120/57 (19 @ 06:00) (103/55 - 132/82)  RR: 18 (19 @ 06:00) (14 - 18)  SpO2: --    PHYSICAL EXAM:      Constitutional: NAD, A&O x3    Eyes: PERRLA, no conjuctivitis    Neck: no lymphadenopathy    Respiratory: +air entry, no rales, no rhonchi, no wheezes    Cardiovascular: +S1 and S2, regular rate and rhythm    Gastrointestinal: +BS, soft, non-tender, not distended    Extremities:  no edema, no calf tenderness    Skin: no rashes, normal turgor                            11.5   8.48  )-----------( 290      ( 2019 14:35 )             35.4       142  |  106  |  11  ----------------------------<  117<H>  3.8   |  27  |  0.7    Ca    8.7      2019 14:35    TPro  6.7  /  Alb  3.3<L>  /  TBili  0.3  /  DBili  x   /  AST  20  /  ALT  33  /  AlkPhos  64            Urinalysis Basic - ( 2019 14:22 )    Color: Yellow / Appearance: Clear / S.020 / pH: x  Gluc: x / Ketone: Negative  / Bili: Negative / Urobili: 2.0 mg/dL   Blood: x / Protein: Trace mg/dL / Nitrite: Negative   Leuk Esterase: Negative / RBC: 6-10 /HPF / WBC 3-5 /HPF   Sq Epi: x / Non Sq Epi: Moderate /HPF / Bacteria: Moderate          Impression and Plan:    Primary Diagnosis:  Opiate Dependency                                Medication: Methadone Protocol completed.  For discharge today    Secondary Diagnosis:     DM                                                             Medication: on meds    Tertiary Diagnosis:          Anxiety/depression                                                             Medication on meds      Continue Detox Protocols. Use of PRNS as needed for withdrawal and comfort.    Adjustments to protocols:    Labs/ Tests reviewed.    Tests ordered:     Likely Disposition: ___Home       _x__Rehab       ___Outpatient Program    ___Self Help     _____Other    Estimated Length of stay:__4__

## 2019-04-08 NOTE — CHART NOTE - NSCHARTNOTEFT_GEN_A_CORE
The patient was admitted to the inpt detox unit CDU, for   ETOH____ Opioid_x__  Benzo____Polysubstance _____ Dependency.    Pt was admitted from ED____, Intake_x___, Med/surg Floor_______.    Details are present in the preceding History & Physical section and follow up chart notes.  patient was evaluated on daily detox team  rounds.  Withdrawal symptoms and signs were reviewed on a daily basis, and the protocols were adjusted accordingly.    Labs and imaging results were reviewed and discussed with the patient.    All questions from the patient were addressed.  The patient was seen by the Chemical dependency counselors, and different options for after care were discussed.  The patient attended groups, meetings and 1:1 sessions with the counselors.  Narcane Kit was offered and instructions given prior to discharge.    Psychiatry consultation reviewed______, N/A__x___    Physical therapy evaluation reviewed_____, N/A_x___    Pt was given copies of labs and imaging reports, if applicable.    Prescriptions if needed, were sent through [facility] [facility] system to the pharmacy amnd are noted in the discharge instruction sheet.    After care was arranged by counselors and pt was discharged to:    Home___, Outpt. Program___, Rehab _x([facility])  __, Long term____ [facility] ____ [facility]____ SNF____, AMA___, Admin Discharge____    Principal Diagnosis: Alcohol Dependency____ Opioid Dependency_x__ Benzo Dependency____ Polysubstance Dependency____

## 2019-04-11 LAB
ALFENTANIL UR QUANT: SIGNIFICANT CHANGE UP NG/MG CREAT
BUPRENORPHINE UR CONFIRMATION: NEGATIVE — SIGNIFICANT CHANGE UP
BUPRENORPHINE UR QUANT: SIGNIFICANT CHANGE UP NG/MG CREAT
CODEINE UR CFM-MCNC: SIGNIFICANT CHANGE UP NG/MG CREAT
DHC UR-MCNC: SIGNIFICANT CHANGE UP NG/MG CREAT
EDDP UR CFM-MCNC: SIGNIFICANT CHANGE UP NG/MG CREAT
FENTANYL UR CFM-MCNC: 3 NG/MG CREAT — SIGNIFICANT CHANGE UP
FENTANYL UR CFM-MCNC: ABNORMAL
HYDROCODONE UR CFM-MCNC: SIGNIFICANT CHANGE UP NG/MG CREAT
HYDROMORPHONE UR CFM-MCNC: SIGNIFICANT CHANGE UP NG/MG CREAT
METHADONE UR CFM-MCNC: NEGATIVE — SIGNIFICANT CHANGE UP
METHADONE UR CFM-MCNC: SIGNIFICANT CHANGE UP NG/MG CREAT
MORPHINE UR CFM-MCNC: 795 NG/MG CREAT — SIGNIFICANT CHANGE UP
NORBUPRENORPHINE QUANT UR: SIGNIFICANT CHANGE UP NG/MG CREAT
NORCODEINE UR-MCNC: SIGNIFICANT CHANGE UP NG/MG CREAT
NORFENTANYL UR-MCNC: 59 NG/MG CREAT — SIGNIFICANT CHANGE UP
NORHYDROCODONE UR CFM-MCNC: SIGNIFICANT CHANGE UP NG/MG CREAT
NORMORPHINE UR QUANT: SIGNIFICANT CHANGE UP NG/MG CREAT
NORMORPHINE UR-MCNC: SIGNIFICANT CHANGE UP NG/MG CREAT
NOROXYCODONE UR CFM-MCNC: SIGNIFICANT CHANGE UP NG/MG CREAT
NOROXYMORPHONE UR CFM-MCNC: SIGNIFICANT CHANGE UP NG/MG CREAT
OPIATES UR CFM-MCNC: ABNORMAL
OXYCODONE UR-MCNC: NEGATIVE — SIGNIFICANT CHANGE UP
OXYCODONE UR-MCNC: SIGNIFICANT CHANGE UP NG/MG CREAT
OXYMORPHONE UR CFM-MCNC: SIGNIFICANT CHANGE UP NG/MG CREAT
SUFENTANIL UR QUANT: SIGNIFICANT CHANGE UP NG/MG CREAT
TAPENTADOL UR CONFIRMATION: NEGATIVE — SIGNIFICANT CHANGE UP
TAPENTADOL UR QUANT: SIGNIFICANT CHANGE UP NG/MG CREAT

## 2019-04-12 DIAGNOSIS — F14.20 COCAINE DEPENDENCE, UNCOMPLICATED: ICD-10-CM

## 2019-04-12 DIAGNOSIS — F32.9 MAJOR DEPRESSIVE DISORDER, SINGLE EPISODE, UNSPECIFIED: ICD-10-CM

## 2019-04-12 DIAGNOSIS — E28.2 POLYCYSTIC OVARIAN SYNDROME: ICD-10-CM

## 2019-04-12 DIAGNOSIS — E05.90 THYROTOXICOSIS, UNSPECIFIED WITHOUT THYROTOXIC CRISIS OR STORM: ICD-10-CM

## 2019-04-12 DIAGNOSIS — B19.20 UNSPECIFIED VIRAL HEPATITIS C WITHOUT HEPATIC COMA: ICD-10-CM

## 2019-04-12 DIAGNOSIS — Z81.8 FAMILY HISTORY OF OTHER MENTAL AND BEHAVIORAL DISORDERS: ICD-10-CM

## 2019-04-12 DIAGNOSIS — E11.9 TYPE 2 DIABETES MELLITUS WITHOUT COMPLICATIONS: ICD-10-CM

## 2019-04-12 DIAGNOSIS — Z56.0 UNEMPLOYMENT, UNSPECIFIED: ICD-10-CM

## 2019-04-12 DIAGNOSIS — F11.20 OPIOID DEPENDENCE, UNCOMPLICATED: ICD-10-CM

## 2019-04-12 DIAGNOSIS — R45.851 SUICIDAL IDEATIONS: ICD-10-CM

## 2019-04-12 DIAGNOSIS — F17.210 NICOTINE DEPENDENCE, CIGARETTES, UNCOMPLICATED: ICD-10-CM

## 2019-04-12 DIAGNOSIS — Z91.5 PERSONAL HISTORY OF SELF-HARM: ICD-10-CM

## 2019-04-12 DIAGNOSIS — G47.00 INSOMNIA, UNSPECIFIED: ICD-10-CM

## 2019-04-12 DIAGNOSIS — F90.9 ATTENTION-DEFICIT HYPERACTIVITY DISORDER, UNSPECIFIED TYPE: ICD-10-CM

## 2019-04-12 DIAGNOSIS — Z59.0 HOMELESSNESS: ICD-10-CM

## 2019-04-12 DIAGNOSIS — Z91.19 PATIENT'S NONCOMPLIANCE WITH OTHER MEDICAL TREATMENT AND REGIMEN: ICD-10-CM

## 2019-04-12 DIAGNOSIS — Z79.84 LONG TERM (CURRENT) USE OF ORAL HYPOGLYCEMIC DRUGS: ICD-10-CM

## 2019-04-12 DIAGNOSIS — Z62.810 PERSONAL HISTORY OF PHYSICAL AND SEXUAL ABUSE IN CHILDHOOD: ICD-10-CM

## 2019-04-12 SDOH — ECONOMIC STABILITY - HOUSING INSECURITY: HOMELESSNESS: Z59.0

## 2019-04-12 SDOH — ECONOMIC STABILITY - INCOME SECURITY: UNEMPLOYMENT, UNSPECIFIED: Z56.0

## 2019-04-14 ENCOUNTER — EMERGENCY (EMERGENCY)
Facility: HOSPITAL | Age: 29
LOS: 0 days | Discharge: HOME | End: 2019-04-14
Attending: EMERGENCY MEDICINE | Admitting: EMERGENCY MEDICINE
Payer: MEDICARE

## 2019-04-14 VITALS
TEMPERATURE: 99 F | RESPIRATION RATE: 18 BRPM | OXYGEN SATURATION: 95 % | SYSTOLIC BLOOD PRESSURE: 114 MMHG | DIASTOLIC BLOOD PRESSURE: 67 MMHG | HEART RATE: 93 BPM

## 2019-04-14 VITALS
DIASTOLIC BLOOD PRESSURE: 58 MMHG | OXYGEN SATURATION: 97 % | TEMPERATURE: 99 F | SYSTOLIC BLOOD PRESSURE: 141 MMHG | HEART RATE: 70 BPM | RESPIRATION RATE: 18 BRPM

## 2019-04-14 DIAGNOSIS — Z79.899 OTHER LONG TERM (CURRENT) DRUG THERAPY: ICD-10-CM

## 2019-04-14 DIAGNOSIS — E03.9 HYPOTHYROIDISM, UNSPECIFIED: ICD-10-CM

## 2019-04-14 DIAGNOSIS — Z90.89 ACQUIRED ABSENCE OF OTHER ORGANS: Chronic | ICD-10-CM

## 2019-04-14 DIAGNOSIS — Z86.69 PERSONAL HISTORY OF OTHER DISEASES OF THE NERVOUS SYSTEM AND SENSE ORGANS: Chronic | ICD-10-CM

## 2019-04-14 DIAGNOSIS — J45.909 UNSPECIFIED ASTHMA, UNCOMPLICATED: ICD-10-CM

## 2019-04-14 DIAGNOSIS — F31.9 BIPOLAR DISORDER, UNSPECIFIED: ICD-10-CM

## 2019-04-14 DIAGNOSIS — I10 ESSENTIAL (PRIMARY) HYPERTENSION: ICD-10-CM

## 2019-04-14 DIAGNOSIS — Z88.2 ALLERGY STATUS TO SULFONAMIDES: ICD-10-CM

## 2019-04-14 DIAGNOSIS — R10.9 UNSPECIFIED ABDOMINAL PAIN: ICD-10-CM

## 2019-04-14 DIAGNOSIS — Z79.84 LONG TERM (CURRENT) USE OF ORAL HYPOGLYCEMIC DRUGS: ICD-10-CM

## 2019-04-14 DIAGNOSIS — N30.00 ACUTE CYSTITIS WITHOUT HEMATURIA: ICD-10-CM

## 2019-04-14 DIAGNOSIS — Z88.8 ALLERGY STATUS TO OTHER DRUGS, MEDICAMENTS AND BIOLOGICAL SUBSTANCES: ICD-10-CM

## 2019-04-14 DIAGNOSIS — Z88.1 ALLERGY STATUS TO OTHER ANTIBIOTIC AGENTS STATUS: ICD-10-CM

## 2019-04-14 DIAGNOSIS — R19.7 DIARRHEA, UNSPECIFIED: ICD-10-CM

## 2019-04-14 PROBLEM — R25.1 TREMOR, UNSPECIFIED: Chronic | Status: ACTIVE | Noted: 2019-04-04

## 2019-04-14 PROBLEM — E66.9 OBESITY, UNSPECIFIED: Chronic | Status: ACTIVE | Noted: 2019-04-04

## 2019-04-14 PROBLEM — F90.9 ATTENTION-DEFICIT HYPERACTIVITY DISORDER, UNSPECIFIED TYPE: Chronic | Status: ACTIVE | Noted: 2019-04-04

## 2019-04-14 LAB
ALBUMIN SERPL ELPH-MCNC: 3.5 G/DL — SIGNIFICANT CHANGE UP (ref 3.5–5.2)
ALP SERPL-CCNC: 67 U/L — SIGNIFICANT CHANGE UP (ref 30–115)
ALT FLD-CCNC: 14 U/L — SIGNIFICANT CHANGE UP (ref 0–41)
ANION GAP SERPL CALC-SCNC: 14 MMOL/L — SIGNIFICANT CHANGE UP (ref 7–14)
APPEARANCE UR: ABNORMAL
AST SERPL-CCNC: 13 U/L — SIGNIFICANT CHANGE UP (ref 0–41)
BACTERIA # UR AUTO: ABNORMAL /HPF
BASE EXCESS BLDV CALC-SCNC: -4 MMOL/L — LOW (ref -2–2)
BASOPHILS # BLD AUTO: 0.02 K/UL — SIGNIFICANT CHANGE UP (ref 0–0.2)
BASOPHILS NFR BLD AUTO: 0.1 % — SIGNIFICANT CHANGE UP (ref 0–1)
BILIRUB SERPL-MCNC: 0.3 MG/DL — SIGNIFICANT CHANGE UP (ref 0.2–1.2)
BILIRUB UR-MCNC: NEGATIVE — SIGNIFICANT CHANGE UP
BUN SERPL-MCNC: 14 MG/DL — SIGNIFICANT CHANGE UP (ref 10–20)
CA-I SERPL-SCNC: 1.23 MMOL/L — SIGNIFICANT CHANGE UP (ref 1.12–1.3)
CALCIUM SERPL-MCNC: 9 MG/DL — SIGNIFICANT CHANGE UP (ref 8.5–10.1)
CHLORIDE SERPL-SCNC: 106 MMOL/L — SIGNIFICANT CHANGE UP (ref 98–110)
CO2 SERPL-SCNC: 20 MMOL/L — SIGNIFICANT CHANGE UP (ref 17–32)
COLOR SPEC: YELLOW — SIGNIFICANT CHANGE UP
CREAT SERPL-MCNC: 0.7 MG/DL — SIGNIFICANT CHANGE UP (ref 0.7–1.5)
DIFF PNL FLD: NEGATIVE — SIGNIFICANT CHANGE UP
EOSINOPHIL # BLD AUTO: 0.26 K/UL — SIGNIFICANT CHANGE UP (ref 0–0.7)
EOSINOPHIL NFR BLD AUTO: 1.6 % — SIGNIFICANT CHANGE UP (ref 0–8)
EPI CELLS # UR: ABNORMAL /HPF
GAS PNL BLDV: 142 MMOL/L — SIGNIFICANT CHANGE UP (ref 136–145)
GAS PNL BLDV: SIGNIFICANT CHANGE UP
GLUCOSE SERPL-MCNC: 75 MG/DL — SIGNIFICANT CHANGE UP (ref 70–99)
GLUCOSE UR QL: NEGATIVE MG/DL — SIGNIFICANT CHANGE UP
HCO3 BLDV-SCNC: 22 MMOL/L — SIGNIFICANT CHANGE UP (ref 22–29)
HCT VFR BLD CALC: 39.3 % — SIGNIFICANT CHANGE UP (ref 37–47)
HCT VFR BLDA CALC: 41.9 % — SIGNIFICANT CHANGE UP (ref 34–44)
HGB BLD CALC-MCNC: 13.7 G/DL — LOW (ref 14–18)
HGB BLD-MCNC: 12.9 G/DL — SIGNIFICANT CHANGE UP (ref 12–16)
IMM GRANULOCYTES NFR BLD AUTO: 0.4 % — HIGH (ref 0.1–0.3)
KETONES UR-MCNC: NEGATIVE — SIGNIFICANT CHANGE UP
LACTATE BLDV-MCNC: 1.8 MMOL/L — HIGH (ref 0.5–1.6)
LEUKOCYTE ESTERASE UR-ACNC: NEGATIVE — SIGNIFICANT CHANGE UP
LIDOCAIN IGE QN: 13 U/L — SIGNIFICANT CHANGE UP (ref 7–60)
LYMPHOCYTES # BLD AUTO: 1.63 K/UL — SIGNIFICANT CHANGE UP (ref 1.2–3.4)
LYMPHOCYTES # BLD AUTO: 10.1 % — LOW (ref 20.5–51.1)
MCHC RBC-ENTMCNC: 26.8 PG — LOW (ref 27–31)
MCHC RBC-ENTMCNC: 32.8 G/DL — SIGNIFICANT CHANGE UP (ref 32–37)
MCV RBC AUTO: 81.5 FL — SIGNIFICANT CHANGE UP (ref 81–99)
MONOCYTES # BLD AUTO: 0.87 K/UL — HIGH (ref 0.1–0.6)
MONOCYTES NFR BLD AUTO: 5.4 % — SIGNIFICANT CHANGE UP (ref 1.7–9.3)
NEUTROPHILS # BLD AUTO: 13.29 K/UL — HIGH (ref 1.4–6.5)
NEUTROPHILS NFR BLD AUTO: 82.4 % — HIGH (ref 42.2–75.2)
NITRITE UR-MCNC: NEGATIVE — SIGNIFICANT CHANGE UP
NRBC # BLD: 0 /100 WBCS — SIGNIFICANT CHANGE UP (ref 0–0)
PCO2 BLDV: 41 MMHG — SIGNIFICANT CHANGE UP (ref 41–51)
PH BLDV: 7.33 — SIGNIFICANT CHANGE UP (ref 7.26–7.43)
PH UR: 5 — SIGNIFICANT CHANGE UP (ref 5–8)
PLATELET # BLD AUTO: 389 K/UL — SIGNIFICANT CHANGE UP (ref 130–400)
PO2 BLDV: 57 MMHG — HIGH (ref 20–40)
POTASSIUM BLDV-SCNC: 4 MMOL/L — SIGNIFICANT CHANGE UP (ref 3.3–5.6)
POTASSIUM SERPL-MCNC: 4.6 MMOL/L — SIGNIFICANT CHANGE UP (ref 3.5–5)
POTASSIUM SERPL-SCNC: 4.6 MMOL/L — SIGNIFICANT CHANGE UP (ref 3.5–5)
PROT SERPL-MCNC: 7.2 G/DL — SIGNIFICANT CHANGE UP (ref 6–8)
PROT UR-MCNC: NEGATIVE MG/DL — SIGNIFICANT CHANGE UP
RBC # BLD: 4.82 M/UL — SIGNIFICANT CHANGE UP (ref 4.2–5.4)
RBC # FLD: 14.1 % — SIGNIFICANT CHANGE UP (ref 11.5–14.5)
SAO2 % BLDV: 89 % — SIGNIFICANT CHANGE UP
SODIUM SERPL-SCNC: 140 MMOL/L — SIGNIFICANT CHANGE UP (ref 135–146)
SP GR SPEC: 1.02 — SIGNIFICANT CHANGE UP (ref 1.01–1.03)
UROBILINOGEN FLD QL: 0.2 MG/DL — SIGNIFICANT CHANGE UP (ref 0.2–0.2)
WBC # BLD: 16.14 K/UL — HIGH (ref 4.8–10.8)
WBC # FLD AUTO: 16.14 K/UL — HIGH (ref 4.8–10.8)
WBC UR QL: SIGNIFICANT CHANGE UP /HPF

## 2019-04-14 PROCEDURE — 36000 PLACE NEEDLE IN VEIN: CPT | Mod: GC

## 2019-04-14 PROCEDURE — 76705 ECHO EXAM OF ABDOMEN: CPT | Mod: 26,GC

## 2019-04-14 PROCEDURE — 74177 CT ABD & PELVIS W/CONTRAST: CPT | Mod: 26

## 2019-04-14 PROCEDURE — 99284 EMERGENCY DEPT VISIT MOD MDM: CPT | Mod: 25,GC

## 2019-04-14 RX ORDER — NITROFURANTOIN MACROCRYSTAL 50 MG
1 CAPSULE ORAL
Qty: 10 | Refills: 0 | OUTPATIENT
Start: 2019-04-14 | End: 2019-04-18

## 2019-04-14 RX ORDER — NITROFURANTOIN MACROCRYSTAL 50 MG
1 CAPSULE ORAL
Qty: 10 | Refills: 0
Start: 2019-04-14 | End: 2019-04-18

## 2019-04-14 RX ORDER — SODIUM CHLORIDE 9 MG/ML
1000 INJECTION, SOLUTION INTRAVENOUS ONCE
Qty: 0 | Refills: 0 | Status: COMPLETED | OUTPATIENT
Start: 2019-04-14 | End: 2019-04-14

## 2019-04-14 RX ADMIN — SODIUM CHLORIDE 1000 MILLILITER(S): 9 INJECTION, SOLUTION INTRAVENOUS at 12:15

## 2019-04-14 NOTE — ED PROVIDER NOTE - OBJECTIVE STATEMENT
28y F w PMH hyperthyroid, Hep C, PCOS, cocaine and heroin abuse last used 4/3 and now in rehab presents for abdominal pain for the last 1 week associated with diarrhea. Stools range from soft to liquid with a maximum of 7 episodes in one day. Vomited several times in the last 2 days. No f/c. No CP or SOB. No bloody stools or vomitus.   Also complaining of dysuria for the last 3 days.

## 2019-04-14 NOTE — ED PROVIDER NOTE - CLINICAL SUMMARY MEDICAL DECISION MAKING FREE TEXT BOX
CT concerning for cystitis and pt is symptomatic, will dc home w abx, f/u pmd 1 week, strict return precautions provided

## 2019-04-14 NOTE — ED PROVIDER NOTE - PHYSICAL EXAMINATION
Constitutional: Well developed, well nourished. NAD. Good general hygiene  Head: Atraumatic.  Eyes: PERRLA. EOMI without discomfort.   ENT: No nasal discharge. Mucous membranes moist.  Neck: Supple. Painless ROM.  Cardiovascular: Regular rhythm. Regular rate. Normal S1 and S2. No murmurs. 2+ pulses in all extremities.   Pulmonary: Normal respiratory rate and effort. Lungs clear to auscultation bilaterally. No wheezing, rales, or rhonchi. Bilateral, equal lung expansion.   Abdominal: Soft. Nondistended. Morbidly obese. RUQ abd tenderness.   Extremities: Pelvis stable. No lower extremity edema. Symmetric calves.  Skin: No rashes. Track marks in b/l AC fossae.   Neuro: AAOx3. No focal neurological deficits.  Psych: Normal mood. Normal affect.

## 2019-04-14 NOTE — ED PROVIDER NOTE - NS ED ROS FT
Constitutional: No fever or chills. Decreased appetite. No unintended weight loss.   Eyes: No vision changes.  ENT: No hearing changes. No ear pain. No sore throat.  Neck: No neck pain or stiffness.  Cardiovascular: No chest pain, palpitations, or edema.  Pulmonary: No cough or SOB. No hemoptysis.  Abdominal: + abdominal pain, nausea, vomiting, and diarrhea.   : +dysuria. No frequency. No hematuria.   Neuro: No headache, syncope, or dizziness.  MS: No back pain. No calf pain/swelling.  Psych: No suicidal or homicidal ideations.

## 2019-04-14 NOTE — ED PROVIDER NOTE - CARE PROVIDER_API CALL
Chucky Rubi)  Internal Medicine  [address] [place] [address]  Phone: [phone]  Fax: [phone]  Follow Up Time: 4-6 Days

## 2019-04-14 NOTE — ED PROVIDER NOTE - ATTENDING CONTRIBUTION TO CARE
28F PMH hep c, opiate/cocaine abuser sober x 1mo, currently living at [facility], htn, hypothyroid, obese, pcos, bipolar/ptsd, asthma, plans to go to [facility] for inpt rehab soon, p/w 7 days watery diarrhea (5x per day). no recent travel, antibiotics. no fever. n/v, x 1 d, 3 x nbnb. c/o RUQ pain assoc. constant, sharp nonradiating. also reports few days of dysuria and freq, feels like uti. no hematuria.     on exam, AFVSS, well ellen nad, ncat, eomi, perrla, mmm, lctab, rrr nl s1s2 no mrg, abd soft mild ruq tto, no rebound or rigidity, no cvat, nd, aaox3, no focal deficits, no le edema or calf ttp,     a/p; RUQ Abd pain, n/v/d, UTI sx, will do labs, ua r/o uti, CT a/p r/o colitis, POCUS RUQ r/o acute cholecystitis, ivf, antiemtic, pain control, re-eval

## 2019-04-14 NOTE — ED PROVIDER NOTE - NSFOLLOWUPINSTRUCTIONS_ED_ALL_ED_FT
Please follow up with your primary doctor in the next 2-3 days.   DO NOT TAKE METFORMIN FOR 48 HOURS.     Urinary Tract Infection    A urinary tract infection (UTI) is an infection of any part of the urinary tract, which includes the kidneys, ureters, bladder, and urethra. Risk factors include ignoring your need to urinate, wiping back to front if female, being an uncircumcised male, and having diabetes or a weak immune system. Symptoms include frequent urination, pain or burning with urination, foul smelling urine, cloudy urine, pain in the lower abdomen, blood in the urine, and fever. If you were prescribed an antibiotic medicine, take it as told by your health care provider. Do not stop taking the antibiotic even if you start to feel better.    SEEK IMMEDIATE MEDICAL CARE IF YOU HAVE ANY OF THE FOLLOWING SYMPTOMS: severe back or abdominal pain, fever, inability to keep fluids or medicine down, dizziness/lightheadedness, or a change in mental status.

## 2019-04-14 NOTE — ED ADULT NURSE NOTE - PMH
Adult ADHD    Cocaine abuse    Depression    Hepatitis C    HTN (hypertension)    Hyperthyroidism    IV drug abuse    Obesity    Occasional tremors    Opioid abuse    PCOS (polycystic ovarian syndrome)

## 2019-04-14 NOTE — ED PROCEDURE NOTE - ATTENDING CONTRIBUTION TO CARE
I was present for and supervised the key/critical aspects of the procedures performed during the care of the patient.
I was present for and supervised the key/critical aspects of the procedures performed during the care of the patient.
Universal Safety Interventions

## 2019-04-15 LAB
CULTURE RESULTS: SIGNIFICANT CHANGE UP
SPECIMEN SOURCE: SIGNIFICANT CHANGE UP

## 2019-04-21 ENCOUNTER — EMERGENCY (EMERGENCY)
Facility: HOSPITAL | Age: 29
LOS: 0 days | Discharge: HOME | End: 2019-04-21
Attending: EMERGENCY MEDICINE | Admitting: EMERGENCY MEDICINE
Payer: MEDICARE

## 2019-04-21 VITALS
TEMPERATURE: 98 F | HEART RATE: 77 BPM | WEIGHT: 289.91 LBS | RESPIRATION RATE: 18 BRPM | HEIGHT: 66 IN | OXYGEN SATURATION: 98 %

## 2019-04-21 VITALS
SYSTOLIC BLOOD PRESSURE: 111 MMHG | DIASTOLIC BLOOD PRESSURE: 60 MMHG | RESPIRATION RATE: 18 BRPM | OXYGEN SATURATION: 98 % | HEART RATE: 91 BPM

## 2019-04-21 DIAGNOSIS — Z88.8 ALLERGY STATUS TO OTHER DRUGS, MEDICAMENTS AND BIOLOGICAL SUBSTANCES: ICD-10-CM

## 2019-04-21 DIAGNOSIS — R19.7 DIARRHEA, UNSPECIFIED: ICD-10-CM

## 2019-04-21 DIAGNOSIS — R10.84 GENERALIZED ABDOMINAL PAIN: ICD-10-CM

## 2019-04-21 DIAGNOSIS — Z86.69 PERSONAL HISTORY OF OTHER DISEASES OF THE NERVOUS SYSTEM AND SENSE ORGANS: Chronic | ICD-10-CM

## 2019-04-21 DIAGNOSIS — Z98.890 OTHER SPECIFIED POSTPROCEDURAL STATES: ICD-10-CM

## 2019-04-21 DIAGNOSIS — Z90.89 ACQUIRED ABSENCE OF OTHER ORGANS: ICD-10-CM

## 2019-04-21 DIAGNOSIS — F90.9 ATTENTION-DEFICIT HYPERACTIVITY DISORDER, UNSPECIFIED TYPE: ICD-10-CM

## 2019-04-21 DIAGNOSIS — Z90.89 ACQUIRED ABSENCE OF OTHER ORGANS: Chronic | ICD-10-CM

## 2019-04-21 DIAGNOSIS — Z87.42 PERSONAL HISTORY OF OTHER DISEASES OF THE FEMALE GENITAL TRACT: ICD-10-CM

## 2019-04-21 DIAGNOSIS — R10.9 UNSPECIFIED ABDOMINAL PAIN: ICD-10-CM

## 2019-04-21 DIAGNOSIS — Z79.2 LONG TERM (CURRENT) USE OF ANTIBIOTICS: ICD-10-CM

## 2019-04-21 DIAGNOSIS — I10 ESSENTIAL (PRIMARY) HYPERTENSION: ICD-10-CM

## 2019-04-21 DIAGNOSIS — Z79.84 LONG TERM (CURRENT) USE OF ORAL HYPOGLYCEMIC DRUGS: ICD-10-CM

## 2019-04-21 DIAGNOSIS — R11.0 NAUSEA: ICD-10-CM

## 2019-04-21 DIAGNOSIS — E78.5 HYPERLIPIDEMIA, UNSPECIFIED: ICD-10-CM

## 2019-04-21 PROBLEM — F19.10 OTHER PSYCHOACTIVE SUBSTANCE ABUSE, UNCOMPLICATED: Chronic | Status: ACTIVE | Noted: 2019-04-14

## 2019-04-21 LAB
ALBUMIN SERPL ELPH-MCNC: 3.8 G/DL — SIGNIFICANT CHANGE UP (ref 3.5–5.2)
ALP SERPL-CCNC: 64 U/L — SIGNIFICANT CHANGE UP (ref 30–115)
ALT FLD-CCNC: 16 U/L — SIGNIFICANT CHANGE UP (ref 0–41)
ANION GAP SERPL CALC-SCNC: 10 MMOL/L — SIGNIFICANT CHANGE UP (ref 7–14)
APPEARANCE UR: CLEAR — SIGNIFICANT CHANGE UP
AST SERPL-CCNC: 13 U/L — SIGNIFICANT CHANGE UP (ref 0–41)
BASOPHILS # BLD AUTO: 0.02 K/UL — SIGNIFICANT CHANGE UP (ref 0–0.2)
BASOPHILS NFR BLD AUTO: 0.1 % — SIGNIFICANT CHANGE UP (ref 0–1)
BILIRUB SERPL-MCNC: 0.4 MG/DL — SIGNIFICANT CHANGE UP (ref 0.2–1.2)
BILIRUB UR-MCNC: NEGATIVE — SIGNIFICANT CHANGE UP
BUN SERPL-MCNC: 18 MG/DL — SIGNIFICANT CHANGE UP (ref 10–20)
C DIFF BY PCR RESULT: NEGATIVE — SIGNIFICANT CHANGE UP
C DIFF TOX GENS STL QL NAA+PROBE: SIGNIFICANT CHANGE UP
CALCIUM SERPL-MCNC: 9.6 MG/DL — SIGNIFICANT CHANGE UP (ref 8.5–10.1)
CHLORIDE SERPL-SCNC: 107 MMOL/L — SIGNIFICANT CHANGE UP (ref 98–110)
CO2 SERPL-SCNC: 20 MMOL/L — SIGNIFICANT CHANGE UP (ref 17–32)
COLOR SPEC: YELLOW — SIGNIFICANT CHANGE UP
CREAT SERPL-MCNC: 0.7 MG/DL — SIGNIFICANT CHANGE UP (ref 0.7–1.5)
DIFF PNL FLD: NEGATIVE — SIGNIFICANT CHANGE UP
EOSINOPHIL # BLD AUTO: 0.18 K/UL — SIGNIFICANT CHANGE UP (ref 0–0.7)
EOSINOPHIL NFR BLD AUTO: 1.1 % — SIGNIFICANT CHANGE UP (ref 0–8)
GLUCOSE SERPL-MCNC: 89 MG/DL — SIGNIFICANT CHANGE UP (ref 70–99)
GLUCOSE UR QL: NEGATIVE MG/DL — SIGNIFICANT CHANGE UP
HCG SERPL QL: NEGATIVE — SIGNIFICANT CHANGE UP
HCT VFR BLD CALC: 42.7 % — SIGNIFICANT CHANGE UP (ref 37–47)
HGB BLD-MCNC: 13.8 G/DL — SIGNIFICANT CHANGE UP (ref 12–16)
IMM GRANULOCYTES NFR BLD AUTO: 0.4 % — HIGH (ref 0.1–0.3)
KETONES UR-MCNC: NEGATIVE — SIGNIFICANT CHANGE UP
LACTATE SERPL-SCNC: 1.6 MMOL/L — SIGNIFICANT CHANGE UP (ref 0.5–2.2)
LEUKOCYTE ESTERASE UR-ACNC: NEGATIVE — SIGNIFICANT CHANGE UP
LIDOCAIN IGE QN: 14 U/L — SIGNIFICANT CHANGE UP (ref 7–60)
LYMPHOCYTES # BLD AUTO: 13 % — LOW (ref 20.5–51.1)
LYMPHOCYTES # BLD AUTO: 2.14 K/UL — SIGNIFICANT CHANGE UP (ref 1.2–3.4)
MCHC RBC-ENTMCNC: 26.5 PG — LOW (ref 27–31)
MCHC RBC-ENTMCNC: 32.3 G/DL — SIGNIFICANT CHANGE UP (ref 32–37)
MCV RBC AUTO: 82.1 FL — SIGNIFICANT CHANGE UP (ref 81–99)
MONOCYTES # BLD AUTO: 0.63 K/UL — HIGH (ref 0.1–0.6)
MONOCYTES NFR BLD AUTO: 3.8 % — SIGNIFICANT CHANGE UP (ref 1.7–9.3)
NEUTROPHILS # BLD AUTO: 13.43 K/UL — HIGH (ref 1.4–6.5)
NEUTROPHILS NFR BLD AUTO: 81.6 % — HIGH (ref 42.2–75.2)
NITRITE UR-MCNC: NEGATIVE — SIGNIFICANT CHANGE UP
NRBC # BLD: 0 /100 WBCS — SIGNIFICANT CHANGE UP (ref 0–0)
PH UR: 5 — SIGNIFICANT CHANGE UP (ref 5–8)
PLATELET # BLD AUTO: 432 K/UL — HIGH (ref 130–400)
POTASSIUM SERPL-MCNC: 4.2 MMOL/L — SIGNIFICANT CHANGE UP (ref 3.5–5)
POTASSIUM SERPL-SCNC: 4.2 MMOL/L — SIGNIFICANT CHANGE UP (ref 3.5–5)
PROT SERPL-MCNC: 7.5 G/DL — SIGNIFICANT CHANGE UP (ref 6–8)
PROT UR-MCNC: NEGATIVE MG/DL — SIGNIFICANT CHANGE UP
RBC # BLD: 5.2 M/UL — SIGNIFICANT CHANGE UP (ref 4.2–5.4)
RBC # FLD: 14.3 % — SIGNIFICANT CHANGE UP (ref 11.5–14.5)
SODIUM SERPL-SCNC: 137 MMOL/L — SIGNIFICANT CHANGE UP (ref 135–146)
SP GR SPEC: 1.02 — SIGNIFICANT CHANGE UP (ref 1.01–1.03)
UROBILINOGEN FLD QL: 0.2 MG/DL — SIGNIFICANT CHANGE UP (ref 0.2–0.2)
WBC # BLD: 16.47 K/UL — HIGH (ref 4.8–10.8)
WBC # FLD AUTO: 16.47 K/UL — HIGH (ref 4.8–10.8)

## 2019-04-21 PROCEDURE — 99284 EMERGENCY DEPT VISIT MOD MDM: CPT

## 2019-04-21 RX ORDER — FAMOTIDINE 10 MG/ML
1 INJECTION INTRAVENOUS
Qty: 6 | Refills: 0
Start: 2019-04-21 | End: 2019-04-23

## 2019-04-21 RX ORDER — FAMOTIDINE 10 MG/ML
20 INJECTION INTRAVENOUS DAILY
Qty: 0 | Refills: 0 | Status: DISCONTINUED | OUTPATIENT
Start: 2019-04-21 | End: 2019-04-21

## 2019-04-21 RX ORDER — SODIUM CHLORIDE 9 MG/ML
1000 INJECTION INTRAMUSCULAR; INTRAVENOUS; SUBCUTANEOUS ONCE
Qty: 0 | Refills: 0 | Status: COMPLETED | OUTPATIENT
Start: 2019-04-21 | End: 2019-04-21

## 2019-04-21 RX ORDER — ACETAMINOPHEN 500 MG
975 TABLET ORAL ONCE
Qty: 0 | Refills: 0 | Status: COMPLETED | OUTPATIENT
Start: 2019-04-21 | End: 2019-04-21

## 2019-04-21 RX ORDER — ONDANSETRON 8 MG/1
4 TABLET, FILM COATED ORAL ONCE
Qty: 0 | Refills: 0 | Status: COMPLETED | OUTPATIENT
Start: 2019-04-21 | End: 2019-04-21

## 2019-04-21 RX ORDER — DIPHENHYDRAMINE HYDROCHLORIDE AND LIDOCAINE HYDROCHLORIDE AND ALUMINUM HYDROXIDE AND MAGNESIUM HYDRO
30 KIT ONCE
Qty: 0 | Refills: 0 | Status: COMPLETED | OUTPATIENT
Start: 2019-04-21 | End: 2019-04-21

## 2019-04-21 RX ADMIN — Medication 975 MILLIGRAM(S): at 12:34

## 2019-04-21 RX ADMIN — DIPHENHYDRAMINE HYDROCHLORIDE AND LIDOCAINE HYDROCHLORIDE AND ALUMINUM HYDROXIDE AND MAGNESIUM HYDRO 30 MILLILITER(S): KIT at 14:32

## 2019-04-21 RX ADMIN — Medication 975 MILLIGRAM(S): at 13:04

## 2019-04-21 RX ADMIN — FAMOTIDINE 100 MILLIGRAM(S): 10 INJECTION INTRAVENOUS at 14:32

## 2019-04-21 RX ADMIN — SODIUM CHLORIDE 1000 MILLILITER(S): 9 INJECTION INTRAMUSCULAR; INTRAVENOUS; SUBCUTANEOUS at 12:34

## 2019-04-21 RX ADMIN — Medication 20 MILLIGRAM(S): at 15:09

## 2019-04-21 RX ADMIN — ONDANSETRON 4 MILLIGRAM(S): 8 TABLET, FILM COATED ORAL at 12:34

## 2019-04-21 RX ADMIN — SODIUM CHLORIDE 1000 MILLILITER(S): 9 INJECTION INTRAMUSCULAR; INTRAVENOUS; SUBCUTANEOUS at 13:34

## 2019-04-21 NOTE — ED PROVIDER NOTE - ATTENDING CONTRIBUTION TO CARE
Pt presents with abdominal pain. Hx of same for almost two weeks. Was seen in the ED one week ago and had CT scan which was normal. Then treated with Macrodantin. Now has diarrhea. On exam mild diffuse tenderness that is not consistently reproduced. No right lower quad tenderness. No right upper quad tenderness. Pt started cymbalta one week ago, however symptoms started before that.

## 2019-04-21 NOTE — ED PROVIDER NOTE - CLINICAL SUMMARY MEDICAL DECISION MAKING FREE TEXT BOX
Pt had neg work up one week ago. Elevated wbc noted. Pt will need a GI consult. Pt had neg work up one week ago. Elevated wbc noted. Pt will need a GI consult. Responded to bentyl.

## 2019-04-21 NOTE — ED PROVIDER NOTE - PROGRESS NOTE DETAILS
Discussed results with pt.  All questions were answered and return precautions discussed.  Pt is asx and comfortable at this time.  Unremarkable re-exam. tolerating PO without difficulty. no further episodes of diarrhea. labs similar to previous. No further concerns at this time from pt.  Will follow up with PMD.  Pt understands and agrees with tx plan.

## 2019-04-21 NOTE — ED PROVIDER NOTE - NS ED ROS FT
Constitutional: See HPI.  Eyes: No visual changes, eye pain or discharge.   ENMT: No hearing changes, pain, discharge or infections.  Cardiac: No SOB or edema. No chest pain with exertion.  Respiratory: No cough or respiratory distress.   GI: + nausea, + diarrhea, + abd cramping. no vomiting.   : No dysuria, frequency or burning. No Discharge  MS: No myalgia, muscle weakness, joint pain or back pain.  Neuro: No headache or weakness.   Skin: No skin rash.  Except as documented in the HPI, all other systems are negative.

## 2019-04-21 NOTE — ED PROVIDER NOTE - NSFOLLOWUPINSTRUCTIONS_ED_ALL_ED_FT
Nausea / Vomiting    Nausea is the feeling that you have to vomit. As nausea gets worse, it can lead to vomiting. Vomiting puts you at an increased risk for dehydration. Older adults and people with other diseases or a weak immune system are at higher risk for dehydration. Drink clear fluids in small but frequent amounts as tolerated. Eat bland, easy-to-digest foods in small amounts as tolerated.    SEEK IMMEDIATE MEDICAL CARE IF YOU HAVE ANY OF THE FOLLOWING SYMPTOMS: fever, inability to keep sufficient fluids down, black or bloody vomitus, black or bloody stools, lightheadedness/dizziness, chest pain, severe headache, rash, shortness of breath, cold or clammy skin, confusion, pain with urination, or any signs of dehydration.    Diarrhea    Diarrhea is frequent loose or watery bowel movements that has many causes. Diarrhea can make you feel weak and cause you to become dehydrated. Diarrhea typically lasts 2–3 days, but can last longer if it is a sign of something more serious. Drink clear fluids to prevent dehydration. Eat bland, easy-to-digest foods as tolerated.     SEEK IMMEDIATE MEDICAL CARE IF YOU HAVE ANY OF THE FOLLOWING SYMPTOMS: high fevers, lightheadedness/dizziness, chest pain, black or bloody stools, shortness of breath, severe abdominal or back pain, or any signs of dehydration.    Follow up with your primary medical doctor in 1-2 days

## 2019-04-21 NOTE — ED PROVIDER NOTE - CARE PROVIDER_API CALL
Tram Locke)  Gastroenterology  [address] [place] [address]  Phone: [phone]  Fax: [phone]  Follow Up Time: 1-3 Days

## 2019-04-21 NOTE — ED PROVIDER NOTE - PHYSICAL EXAMINATION
CONST: Well appearing in NAD  EYES: Sclera and conjunctiva clear.  CARD: Normal S1 S2; Normal rate and rhythm  RESP: Equal BS B/L, No wheezes, rhonchi or rales. No distress  GI: Soft, non-tender, non-distended, no CVA tenderness   MS: Normal ROM in all extremities. No edema of lower extremities, no calf pain, radial pulses 2+ bilaterally  SKIN: + tract pace bilateral ACs, Warm, dry, no acute rashes. Good turgor  NEURO: A&Ox3, No focal deficits. Strength 5/5 with no sensory deficits

## 2019-04-21 NOTE — ED PROVIDER NOTE - OBJECTIVE STATEMENT
28 y.o female w/ hx of IVDA, ADHD, HTN, Hep C, HLD, depression presents to the ED for evaluation of abd pain x 1 day.  States that last night developed abdominal cramping associated with nausea and 8 small episodes of watery diarrhea prompting visit to the ED. States that she has hx of c. diff in the past and states that this feels similar. Recently on abx.  No further complaints at this time.  Denies constipation, vaginal d/c or bleeding, urinary sxs, fever, chills.

## 2019-04-22 LAB
CULTURE RESULTS: SIGNIFICANT CHANGE UP
SPECIMEN SOURCE: SIGNIFICANT CHANGE UP

## 2019-05-01 ENCOUNTER — OUTPATIENT (OUTPATIENT)
Dept: OUTPATIENT SERVICES | Facility: HOSPITAL | Age: 29
LOS: 1 days | End: 2019-05-01
Payer: MEDICARE

## 2019-05-01 DIAGNOSIS — Z90.89 ACQUIRED ABSENCE OF OTHER ORGANS: Chronic | ICD-10-CM

## 2019-05-01 DIAGNOSIS — Z86.69 PERSONAL HISTORY OF OTHER DISEASES OF THE NERVOUS SYSTEM AND SENSE ORGANS: Chronic | ICD-10-CM

## 2019-05-01 PROCEDURE — G9001: CPT

## 2019-05-13 DIAGNOSIS — Z71.89 OTHER SPECIFIED COUNSELING: ICD-10-CM

## 2019-05-24 NOTE — ED ADULT TRIAGE NOTE - NS ED TRIAGE EKG
Patient:   SANTI KIDD            MRN: [id number]            FIN: [id number]              Age:   87 years     Sex:  MALE     :  34   Associated Diagnoses:   None   Author:   SIXTO SANTILLAN     Basic Information   Present at bedside:  Wife, son and goddaughter.    Source of history:  Self.    Referral source:  Emergency department.    History limitation:  Clinical condition.    Chief Complaint: AMS  Location: Generalized   Quality :  Severity :  Duration: Several weeks   Timing : Constant  Context :  Modifying Factors : None  Associated Signs and Symptoms: None     History of Present Illness   Mr. Kidd is an 88y/o with a history of hypothyroidism, HTN, IDDM and dementia who presented for worsening confusion. Mr. Kidd reports rhinorrhea. However, Mr. Kidd's wife reports noticing his blood sugar has been elevated for the last several days. He was diagnosed with dementia in 2017. However, the last few weeks, his wife has noticed he has been more disoriented and is argumentative.  He roams at night. His wife has been noticing a decline  since the beginning of the year, but the last few weeks in particular have been worse. He recently has been urinating all over the house, as he reports he is unable to make it to the bathroom.  Mr. Kidd refuses to leave the house, even to go to the [facility] to get his hair and cooper trimmed. Mr. Kidd hasn't had nausea/vomiting or diarrhea per his wife. He hasn't had LE swelling. He denies pain with urination. Mr. Kidd is very unsteady walking even when using his cane. He has a good appetite. Mr. Kidd's wife doesn't feel like she can continue to care for him.     Review of Systems   A comprehensive ROS obtained and negative except as per HPI     Health Status   Allergies:    Allergic Reactions (All)  NKA   Current medications:    Medications (1) Active  Scheduled: (1)  pneumococcal 23-polyvalent vaccine  0.5 mL, IM, On Call  Continuous: (0)  PRN: (0)      Histories    Past Medical History: Problem List / Past Medical History   CAD (coronary artery disease) s/p CABG  Diabetes  HLD (hyperlipidemia)  HTN (hypertension)  Hypothyroid  Mild aortic stenosis  Dementia  Prostate Cancer s/p 43 rounds of chemo   Family History:    MOTHER:  ()  Cause of Death: Breast cancer  Age at Death: 82 years.   CA - Breast cancer  FATHER  BPH (benign prostatic hyperplasia)  BROTHER  Diabetes mellitus type 2    Procedure history:    CABG x 3 - Coronary artery bypass grafts x 3 ([id number]).  Cardiac pacemaker procedure ([id number]).  Comments:  2019 18:32 - Torey-GINA, Lizbeth  [facility]  Hydrocelectomy ([id number]).   Social History       Family denies alcohol or illicit drugs. He quit smoking 30 years ago. He is a retired     Alcohol  Details: Alcohol Abuse in Household: No.  Use: None.  Exercise  Details: Exercise: Never.  Sexual  Details: Sexual orientation: Straight or heterosexual.  Gender Identity: Identifies as male.  Preferred Pronouns: Male.  Substance Abuse  Details: Substance Abuse in Household: No.  Use: None.  Tobacco  Details: Smoked/Smokeless Tobacco Last 30 Days: No.  Use: Former smoker.  Cultural/Mormonism Practices  Details: Mormonism or Cultural Practices While in Hospital: No.  .       Physical Examination   VS/Measurements       Vitals between:   22-MAY-2019 18:56:08   TO   23-MAY-2019 18:56:08                   LAST RESULT MINIMUM MAXIMUM  Temperature 37.1 37.1 37.5  Heart Rate 63 63 77  Respiratory Rate 16 16 16  NISBP           152 152 159  NIDBP           56 56 63  NIMBP           88 88 95  SpO2                    98 98 98    General:  No acute distress, sitting up in bed.    Eye:  Extraocular movements are intact, Normal conjunctiva, arcus senilus.   HENT:  Normocephalic, Oral mucosa is moist.    Respiratory:  Lungs are clear to auscultation, Respirations are non-labored, Breath sounds are equal.   Cardiovascular:  Normal rate, Regular rhythm, No murmur.     Gastrointestinal:  Soft, Non-tender, Non-distended, Normal bowel sounds.   Integumentary:  Warm, Moist, No rash.    Neurologic:  Alert, generalized weakness.    Cognition and Speech:  Oriented (to self), Speech clear and coherent.   Psychiatric:  Cooperative, Appropriate mood & affect.      Review / Management   Results review:       Labs between:  22-MAY-2019 18:56 to 23-MAY-2019 18:56    CBC:                 WBC  HgB  Hct  Plt  MCV  RDW   23-MAY-2019 5.0  (L) 10.9  (L) 34.8  (L) 107  98.3  14.1     DIFF:                 Seg  Neutroph//ABS  Lymph//ABS  Mono//ABS  EOS/ABS  23-MAY-2019 NOT APPLICABLE  73 // 3.7 16 // (L) 0.8  10 // 0.5 1 // (L) 0.0     BMP:                 Na  Cl  BUN  Glu   23-MAY-2019 137  101  (H) 24  (!) 568                              K  CO2  Cr  Ca                              4.3  29  (H) 1.60  9.8     CMP:                 AST  ALT  AlkPhos  Bili  Albumin   23-MAY-2019 10  22  59  0.4  (L) 3.1     POC GLU:                 Latest Result  Latest Date  Minimum  Min Date  Maximum  Max Date                             (H) 426  23-MAY-2019 (H) 426  23-MAY-2019 (!) 501  23-MAY-2019    COAG:                 INR  PT  PTT  Ddimer  Fibrinogen    23-MAY-2019 3.4  (H) 33.5  (H) 39                      .      Impression and Plan   Dx and Plan   I expect patient will require two or more nights of hospital care and services as per my plan below:  Mr. Kidd is an 88 y/o with a history of CAD s/p CABG, Complete heart block s/p pacemaker, hypothyroidism, IDDM and dementia who presented with AMS and was found to be hyperglycemic.    Acute metabolic encephalopathy on chronic encephalopathy  - Likely due to worsening of dementia, vs related to hyperglycemia  - frequent reorientation, normalization of sleep-wake cycle and minimization of night time interruptions  - Continue mirtazapine prn insomnia    IDDM with hyperglycemia  - Consult [facility], Dr. Palla, appreciate recs  - Change Levemir to Lantus qHS and decrease  dose  - Start Humalog  - Hold Glipizide  - Accuchecks and SSI  - Check HgA1c    Supratherapeutic INR  - Hold warfarin tonight  - Resume warfarin tomorrow    Normocytic anemia  - Monitor H/H  - Check iron studies  - Transfuse prn Hg <7    Thrombocytopenia  - Unclear etilogy  - Repeat in am    CKD stage III  - Prior baseline 1.2-1.4  - Slight elevation may be progression of prior dc  - Monitor with IVF    CAD s/p CABG, HTN: Continue Metoprolol, lasix and atorvastatin in place of home Crestor  Hypothyroidism: Continue Levothyroxine. Check TSH  Bladder spasms: Continue Bethanechol  Other: Continue Vit D and B12    Diabetic diet, IVF  Xarelto  Full (non-decisional due to dementia. POA wife- Petty [phone])   EKG completed

## 2019-06-08 ENCOUNTER — EMERGENCY (EMERGENCY)
Facility: HOSPITAL | Age: 29
LOS: 0 days | Discharge: HOME | End: 2019-06-08
Attending: EMERGENCY MEDICINE
Payer: MEDICARE

## 2019-06-08 VITALS
RESPIRATION RATE: 18 BRPM | WEIGHT: 293 LBS | HEART RATE: 105 BPM | SYSTOLIC BLOOD PRESSURE: 125 MMHG | TEMPERATURE: 100 F | HEIGHT: 67 IN | OXYGEN SATURATION: 98 % | DIASTOLIC BLOOD PRESSURE: 69 MMHG

## 2019-06-08 DIAGNOSIS — Z90.89 ACQUIRED ABSENCE OF OTHER ORGANS: Chronic | ICD-10-CM

## 2019-06-08 DIAGNOSIS — Z86.69 PERSONAL HISTORY OF OTHER DISEASES OF THE NERVOUS SYSTEM AND SENSE ORGANS: Chronic | ICD-10-CM

## 2019-06-08 DIAGNOSIS — Z02.9 ENCOUNTER FOR ADMINISTRATIVE EXAMINATIONS, UNSPECIFIED: ICD-10-CM

## 2019-06-08 PROCEDURE — 99283 EMERGENCY DEPT VISIT LOW MDM: CPT

## 2019-06-08 RX ORDER — AZTREONAM 2 G
1 VIAL (EA) INJECTION
Qty: 20 | Refills: 0
Start: 2019-06-08 | End: 2019-06-17

## 2019-06-08 RX ADMIN — Medication 1 TABLET(S): at 22:06

## 2019-06-08 NOTE — ED ADULT TRIAGE NOTE - CHIEF COMPLAINT QUOTE
pt states she uses IV drugs crystal meth, last injected 3 days ago into left upper arm, and states left upper arm is red, swollen and painful, has tried warm compresses but symptoms have not improved.

## 2019-06-08 NOTE — ED PROVIDER NOTE - NS ED ROS FT
Constitutional: no fever, chills, no recent weight loss, change in appetite or malaise  Cardiac: No chest pain, SOB or edema.  Respiratory: No cough or respiratory distress  MS: no pain to back or extremities, no loss of ROM, no weakness  Neuro: No headache or weakness. No LOC.  Skin: + area of redness/warmth/swelling over L bicep  Endocrine: No history of diabetes.  Except as documented in the HPI, all other systems are negative.

## 2019-06-08 NOTE — ED ADULT NURSE NOTE - NSIMPLEMENTINTERV_GEN_ALL_ED
Implemented All Universal Safety Interventions:  [place] to call system. Call bell, personal items and telephone within reach. Instruct patient to call for assistance. Room bathroom lighting operational. Non-slip footwear when patient is off stretcher. Physically safe environment: no spills, clutter or unnecessary equipment. Stretcher in lowest position, wheels locked, appropriate side rails in place.

## 2019-06-08 NOTE — ED PROVIDER NOTE - OBJECTIVE STATEMENT
28 year old F with hx of IVDA, HTN, opioid abuse cocaine use, hep c, hyperthyroidism c/o infection to left bicep x 3 days. Pt sts she noticed redness, pain and swelling to L bicep 3 days ago that has been worsening. She denies any other fever/chills, nausea, vomiting, drainage, vesicles, trauma/injuries.

## 2019-06-08 NOTE — ED PROVIDER NOTE - PHYSICAL EXAMINATION
CONSTITUTIONAL: + obese female  NECK: Supple; non-tender; no cervical lymphadenopathy.   CARDIOVASCULAR: Normal S1, S2; no murmurs, rubs, or gallops.   RESPIRATORY: Normal chest excursion with respiration; breath sounds clear and equal bilaterally; no wheezes, rhonchi, or rales.  MS: No evidence of trauma or deformity. Normal ROM in all four extremities; non-tender to palpation;  radial pulses intact  SKIN: + area of erythema, warmth/swelling over L bicep. + area of induration noted. No area of fluctuance  NEURO/PSYCH: A & O x 4; grossly unremarkable. mood and manner are appropriate.

## 2019-06-10 ENCOUNTER — INPATIENT (INPATIENT)
Facility: HOSPITAL | Age: 29
LOS: 1 days | Discharge: HOME | End: 2019-06-12
Attending: HOSPITALIST | Admitting: HOSPITALIST
Payer: MEDICARE

## 2019-06-10 VITALS
HEART RATE: 88 BPM | TEMPERATURE: 99 F | WEIGHT: 293 LBS | DIASTOLIC BLOOD PRESSURE: 86 MMHG | OXYGEN SATURATION: 100 % | RESPIRATION RATE: 16 BRPM | SYSTOLIC BLOOD PRESSURE: 145 MMHG

## 2019-06-10 DIAGNOSIS — Z90.89 ACQUIRED ABSENCE OF OTHER ORGANS: Chronic | ICD-10-CM

## 2019-06-10 DIAGNOSIS — Z86.69 PERSONAL HISTORY OF OTHER DISEASES OF THE NERVOUS SYSTEM AND SENSE ORGANS: Chronic | ICD-10-CM

## 2019-06-10 LAB
BASOPHILS # BLD AUTO: 0.02 K/UL — SIGNIFICANT CHANGE UP (ref 0–0.2)
BASOPHILS NFR BLD AUTO: 0.2 % — SIGNIFICANT CHANGE UP (ref 0–1)
EOSINOPHIL # BLD AUTO: 0.43 K/UL — SIGNIFICANT CHANGE UP (ref 0–0.7)
EOSINOPHIL NFR BLD AUTO: 3.5 % — SIGNIFICANT CHANGE UP (ref 0–8)
HCT VFR BLD CALC: 38.4 % — SIGNIFICANT CHANGE UP (ref 37–47)
HGB BLD-MCNC: 12.5 G/DL — SIGNIFICANT CHANGE UP (ref 12–16)
IMM GRANULOCYTES NFR BLD AUTO: 0.3 % — SIGNIFICANT CHANGE UP (ref 0.1–0.3)
LYMPHOCYTES # BLD AUTO: 27.5 % — SIGNIFICANT CHANGE UP (ref 20.5–51.1)
LYMPHOCYTES # BLD AUTO: 3.36 K/UL — SIGNIFICANT CHANGE UP (ref 1.2–3.4)
MCHC RBC-ENTMCNC: 26.7 PG — LOW (ref 27–31)
MCHC RBC-ENTMCNC: 32.6 G/DL — SIGNIFICANT CHANGE UP (ref 32–37)
MCV RBC AUTO: 82.1 FL — SIGNIFICANT CHANGE UP (ref 81–99)
MONOCYTES # BLD AUTO: 0.64 K/UL — HIGH (ref 0.1–0.6)
MONOCYTES NFR BLD AUTO: 5.2 % — SIGNIFICANT CHANGE UP (ref 1.7–9.3)
NEUTROPHILS # BLD AUTO: 7.73 K/UL — HIGH (ref 1.4–6.5)
NEUTROPHILS NFR BLD AUTO: 63.3 % — SIGNIFICANT CHANGE UP (ref 42.2–75.2)
NRBC # BLD: 0 /100 WBCS — SIGNIFICANT CHANGE UP (ref 0–0)
PLATELET # BLD AUTO: 331 K/UL — SIGNIFICANT CHANGE UP (ref 130–400)
RBC # BLD: 4.68 M/UL — SIGNIFICANT CHANGE UP (ref 4.2–5.4)
RBC # FLD: 14.6 % — HIGH (ref 11.5–14.5)
WBC # BLD: 12.22 K/UL — HIGH (ref 4.8–10.8)
WBC # FLD AUTO: 12.22 K/UL — HIGH (ref 4.8–10.8)

## 2019-06-10 PROCEDURE — 10060 I&D ABSCESS SIMPLE/SINGLE: CPT

## 2019-06-10 PROCEDURE — 99285 EMERGENCY DEPT VISIT HI MDM: CPT | Mod: 25

## 2019-06-10 NOTE — ED ADULT NURSE NOTE - NSIMPLEMENTINTERV_GEN_ALL_ED
Implemented All Universal Safety Interventions:  [place] to call system. Call bell, personal items and telephone within reach. Instruct patient to call for assistance. Room bathroom lighting operational. Non-slip footwear when patient is off stretcher. Physically safe environment: no spills, clutter or unnecessary equipment. Stretcher in lowest position, wheels locked, appropriate side rails in place.

## 2019-06-10 NOTE — ED ADULT NURSE NOTE - CHIEF COMPLAINT QUOTE
abscess on left upper arm, patient admits to "shooting up" in area prior to abscess developing.  Here 3 days ago for same complaint, was placed on Bactrim.

## 2019-06-11 ENCOUNTER — TRANSCRIPTION ENCOUNTER (OUTPATIENT)
Age: 29
End: 2019-06-11

## 2019-06-11 DIAGNOSIS — F90.9 ATTENTION-DEFICIT HYPERACTIVITY DISORDER, UNSPECIFIED TYPE: ICD-10-CM

## 2019-06-11 DIAGNOSIS — E28.2 POLYCYSTIC OVARIAN SYNDROME: ICD-10-CM

## 2019-06-11 DIAGNOSIS — F19.10 OTHER PSYCHOACTIVE SUBSTANCE ABUSE, UNCOMPLICATED: ICD-10-CM

## 2019-06-11 DIAGNOSIS — Z79.899 OTHER LONG TERM (CURRENT) DRUG THERAPY: ICD-10-CM

## 2019-06-11 DIAGNOSIS — B19.20 UNSPECIFIED VIRAL HEPATITIS C WITHOUT HEPATIC COMA: ICD-10-CM

## 2019-06-11 DIAGNOSIS — E66.9 OBESITY, UNSPECIFIED: ICD-10-CM

## 2019-06-11 DIAGNOSIS — L02.91 CUTANEOUS ABSCESS, UNSPECIFIED: ICD-10-CM

## 2019-06-11 DIAGNOSIS — I10 ESSENTIAL (PRIMARY) HYPERTENSION: ICD-10-CM

## 2019-06-11 DIAGNOSIS — L03.114 CELLULITIS OF LEFT UPPER LIMB: ICD-10-CM

## 2019-06-11 LAB
ANION GAP SERPL CALC-SCNC: 10 MMOL/L — SIGNIFICANT CHANGE UP (ref 7–14)
BUN SERPL-MCNC: 13 MG/DL — SIGNIFICANT CHANGE UP (ref 10–20)
CALCIUM SERPL-MCNC: 8.9 MG/DL — SIGNIFICANT CHANGE UP (ref 8.5–10.1)
CHLORIDE SERPL-SCNC: 105 MMOL/L — SIGNIFICANT CHANGE UP (ref 98–110)
CO2 SERPL-SCNC: 23 MMOL/L — SIGNIFICANT CHANGE UP (ref 17–32)
CREAT SERPL-MCNC: 0.9 MG/DL — SIGNIFICANT CHANGE UP (ref 0.7–1.5)
GLUCOSE SERPL-MCNC: 107 MG/DL — HIGH (ref 70–99)
LACTATE SERPL-SCNC: 1.4 MMOL/L — SIGNIFICANT CHANGE UP (ref 0.5–2.2)
POTASSIUM SERPL-MCNC: 4 MMOL/L — SIGNIFICANT CHANGE UP (ref 3.5–5)
POTASSIUM SERPL-SCNC: 4 MMOL/L — SIGNIFICANT CHANGE UP (ref 3.5–5)
SODIUM SERPL-SCNC: 138 MMOL/L — SIGNIFICANT CHANGE UP (ref 135–146)

## 2019-06-11 PROCEDURE — 99221 1ST HOSP IP/OBS SF/LOW 40: CPT | Mod: 25

## 2019-06-11 PROCEDURE — 99223 1ST HOSP IP/OBS HIGH 75: CPT | Mod: AI

## 2019-06-11 PROCEDURE — 11042 DBRDMT SUBQ TIS 1ST 20SQCM/<: CPT

## 2019-06-11 PROCEDURE — 73060 X-RAY EXAM OF HUMERUS: CPT | Mod: 26,LT

## 2019-06-11 PROCEDURE — 93971 EXTREMITY STUDY: CPT | Mod: 26,LT

## 2019-06-11 PROCEDURE — 76882 US LMTD JT/FCL EVL NVASC XTR: CPT | Mod: 26,LT

## 2019-06-11 RX ORDER — PROPYLTHIOURACIL 50 MG
100 TABLET ORAL
Refills: 0 | Status: DISCONTINUED | OUTPATIENT
Start: 2019-06-11 | End: 2019-06-12

## 2019-06-11 RX ORDER — PROPYLTHIOURACIL 50 MG
50 TABLET ORAL
Refills: 0 | Status: DISCONTINUED | OUTPATIENT
Start: 2019-06-11 | End: 2019-06-12

## 2019-06-11 RX ORDER — GABAPENTIN 400 MG/1
600 CAPSULE ORAL
Refills: 0 | Status: DISCONTINUED | OUTPATIENT
Start: 2019-06-11 | End: 2019-06-12

## 2019-06-11 RX ORDER — METHADONE HYDROCHLORIDE 40 MG/1
10 TABLET ORAL DAILY
Refills: 0 | Status: DISCONTINUED | OUTPATIENT
Start: 2019-06-11 | End: 2019-06-11

## 2019-06-11 RX ORDER — FAMOTIDINE 10 MG/ML
20 INJECTION INTRAVENOUS
Refills: 0 | Status: DISCONTINUED | OUTPATIENT
Start: 2019-06-11 | End: 2019-06-12

## 2019-06-11 RX ORDER — PROPYLTHIOURACIL 50 MG
100 TABLET ORAL DAILY
Refills: 0 | Status: DISCONTINUED | OUTPATIENT
Start: 2019-06-11 | End: 2019-06-12

## 2019-06-11 RX ORDER — AMLODIPINE BESYLATE 2.5 MG/1
10 TABLET ORAL DAILY
Refills: 0 | Status: DISCONTINUED | OUTPATIENT
Start: 2019-06-11 | End: 2019-06-12

## 2019-06-11 RX ORDER — ARIPIPRAZOLE 15 MG/1
30 TABLET ORAL AT BEDTIME
Refills: 0 | Status: DISCONTINUED | OUTPATIENT
Start: 2019-06-11 | End: 2019-06-12

## 2019-06-11 RX ORDER — VANCOMYCIN HCL 1 G
1500 VIAL (EA) INTRAVENOUS ONCE
Refills: 0 | Status: COMPLETED | OUTPATIENT
Start: 2019-06-11 | End: 2019-06-11

## 2019-06-11 RX ORDER — ESCITALOPRAM OXALATE 10 MG/1
20 TABLET, FILM COATED ORAL DAILY
Refills: 0 | Status: DISCONTINUED | OUTPATIENT
Start: 2019-06-11 | End: 2019-06-12

## 2019-06-11 RX ORDER — TOPIRAMATE 25 MG
100 TABLET ORAL
Refills: 0 | Status: DISCONTINUED | OUTPATIENT
Start: 2019-06-11 | End: 2019-06-12

## 2019-06-11 RX ORDER — ENOXAPARIN SODIUM 100 MG/ML
40 INJECTION SUBCUTANEOUS EVERY 24 HOURS
Refills: 0 | Status: DISCONTINUED | OUTPATIENT
Start: 2019-06-11 | End: 2019-06-12

## 2019-06-11 RX ORDER — HYDROXYZINE HCL 10 MG
25 TABLET ORAL
Refills: 0 | Status: DISCONTINUED | OUTPATIENT
Start: 2019-06-11 | End: 2019-06-12

## 2019-06-11 RX ADMIN — Medication 110 MILLIGRAM(S): at 06:32

## 2019-06-11 RX ADMIN — Medication 50 MILLIGRAM(S): at 21:06

## 2019-06-11 RX ADMIN — Medication 100 MILLIGRAM(S): at 17:23

## 2019-06-11 RX ADMIN — Medication 110 MILLIGRAM(S): at 00:26

## 2019-06-11 RX ADMIN — FAMOTIDINE 20 MILLIGRAM(S): 10 INJECTION INTRAVENOUS at 17:23

## 2019-06-11 RX ADMIN — Medication 300 MILLIGRAM(S): at 11:16

## 2019-06-11 RX ADMIN — GABAPENTIN 600 MILLIGRAM(S): 400 CAPSULE ORAL at 06:31

## 2019-06-11 RX ADMIN — GABAPENTIN 600 MILLIGRAM(S): 400 CAPSULE ORAL at 17:22

## 2019-06-11 RX ADMIN — Medication 300 MILLIGRAM(S): at 14:22

## 2019-06-11 RX ADMIN — Medication 10 MILLIGRAM(S): at 06:31

## 2019-06-11 RX ADMIN — Medication 100 MILLIGRAM(S): at 08:49

## 2019-06-11 RX ADMIN — Medication 300 MILLIGRAM(S): at 17:22

## 2019-06-11 RX ADMIN — Medication 2 TABLET(S): at 17:22

## 2019-06-11 RX ADMIN — ARIPIPRAZOLE 30 MILLIGRAM(S): 15 TABLET ORAL at 21:06

## 2019-06-11 RX ADMIN — FAMOTIDINE 20 MILLIGRAM(S): 10 INJECTION INTRAVENOUS at 06:31

## 2019-06-11 RX ADMIN — AMLODIPINE BESYLATE 10 MILLIGRAM(S): 2.5 TABLET ORAL at 06:31

## 2019-06-11 RX ADMIN — Medication 100 MILLIGRAM(S): at 06:31

## 2019-06-11 RX ADMIN — Medication 300 MILLIGRAM(S): at 23:22

## 2019-06-11 NOTE — CONSULT NOTE ADULT - ASSESSMENT
29 y/o s/p I and  D  of left arm abcess in ED 27 y/o s/p I and  D  of left arm abscess in ED. Patient has significant residual necrotic tissue and induration most of necrotic tissue was debrided by bedside and wound packed and dressed. patient already seen by Id and can be discharged later today with antibiotics and outpatient followup.

## 2019-06-11 NOTE — ED PROCEDURE NOTE - PROCEDURE ADDITIONAL DETAILS
3 cm horizontal incision made over center of abscess at point of most fluctuance w profuse drainage of purulent and bloody fluid.

## 2019-06-11 NOTE — H&P ADULT - NSHPLABSRESULTS_GEN_ALL_CORE
12.5   12.22 )-----------( 331      ( 10 Gallo 2019 23:35 )             38.4     06-10    138  |  105  |  13  ----------------------------<  107<H>  4.0   |  23  |  0.9    Ca    8.9      10 Gallo 2019 23:35                Lactate Trend  06-10 @ 23:35 Lactate:1.4         CAPILLARY BLOOD GLUCOSE

## 2019-06-11 NOTE — DISCHARGE NOTE PROVIDER - CARE PROVIDER_API CALL
ODALYS,   Phone: (   )    -  Fax: (   )    -  Follow Up Time:     Marianne Montenegro)  Surgery  [address] [place] [address]  Phone: [phone]  Fax: [phone]  Follow Up Time: Marianne Montenegro)  Surgery  [address] [place] [address]  Phone: [phone]  Fax: [phone]  Follow Up Time:     PMD,   Phone: (   )    -  Fax: (   )    -  Follow Up Time:     Central intake,   Phone: [phone]  Fax: (   )    -  Follow Up Time: 1-3 days

## 2019-06-11 NOTE — ED PROVIDER NOTE - CARE PLAN
Principal Discharge DX:	Abscess  Secondary Diagnosis:	Cellulitis  Secondary Diagnosis:	Failure of outpatient treatment  Secondary Diagnosis:	Intravenous drug abuse

## 2019-06-11 NOTE — CONSULT NOTE ADULT - SUBJECTIVE AND OBJECTIVE BOX
LYNDON SMITH  28y, Female  Allergy: BuSpar (Rash)  clonidine (Other (Mild))  Lamictal (Rash)  Nicotine Patch (Rash (Mild))      CHIEF COMPLAINT: left arm cellulitis, abscess (11 Jun 2019 03:26)      HPI:  (Patient very sleepy, used chart information too) 29yo female presented to ER for evaluation of infection to left arm. She told ER that she used that arm to inject drugs a few days ago. Actually came to the ER 3 days ago, received diagnosis of and was started on oral antibiotics Augmentin and bactrim). However area of involvement apparently swelled up to size of a "baseball" and she developed fever of 101 so she returned to ER. While there an I + D was done by staff (11 Jun 2019 03:26)    FAMILY HISTORY:  No pertinent family history in first degree relatives    PAST MEDICAL & SURGICAL HISTORY:  IV drug abuse  Obesity  Adult ADHD  Occasional tremors  HTN (hypertension)  Opioid abuse  Cocaine abuse  Hepatitis C  Depression  PCOS (polycystic ovarian syndrome)  Hyperthyroidism  History of retinal tear: bilateral, s/p laser Sx  S/P tonsillectomy  History of uvulectomy      Substance Use (  ) never used  (X  ) IVDU (  ) Other:  Tobacco Usage:  (   ) never smoked   (   ) former smoker   (  X ) current smoker   Alcohol Usage: (X   ) social  (   ) daily use (   ) denies  Sexual History: not relevant       ROS  10 system review - unremarkable     VITALS:  T(F): 98.7, Max: 98.7 (06-11-19 @ 06:03)  HR: 92  BP: 118/65  RR: 16Vital Signs Last 24 Hrs  T(C): 37.1 (11 Jun 2019 06:03), Max: 37.1 (11 Jun 2019 06:03)  T(F): 98.7 (11 Jun 2019 06:03), Max: 98.7 (11 Jun 2019 06:03)  HR: 92 (11 Jun 2019 06:03) (88 - 106)  BP: 118/65 (11 Jun 2019 06:03) (118/65 - 145/86)  BP(mean): --  RR: 16 (11 Jun 2019 06:03) (16 - 18)  SpO2: 100% (10 Gallo 2019 22:15) (100% - 100%)    PHYSICAL EXAM:  Gen: NAD, resting in bed  HEENT: Normocephalic, atraumatic  Neck: supple, no lymphadenopathy  CV: s1 s2 +   Lungs: clear   Abdomen: Soft, BS present. obese  Ext: Warm, well perfused. left arm wound - minimal exudate   Neuro: non focal, awake  Skin: no rash    TESTS & MEASUREMENTS:                        12.5   12.22 )-----------( 331      ( 10 Gallo 2019 23:35 )             38.4     06-10    138  |  105  |  13  ----------------------------<  107<H>  4.0   |  23  |  0.9    Ca    8.9      10 Gallo 2019 23:35      eGFR if Non African American: 88 mL/min/1.73M2 (06-10-19 @ 23:35)  eGFR if : 102 mL/min/1.73M2 (06-10-19 @ 23:35)            Lactate, Blood: 1.4 mmol/L (06-10-19 @ 23:35)      INFECTIOUS DISEASES TESTING  Hepatitis B Surface Antigen: Nonreact (03-05-19 @ 17:07)  HIV-1/2 Combo Result: Nonreact (03-05-19 @ 17:07)  Hepatitis B Surface Antigen: Nonreact (07-16-18 @ 12:29)  HIV-1/2 Combo Result: Nonreact (07-16-18 @ 12:29)      RADIOLOGY & ADDITIONAL TESTS:  I have personally reviewed the last Chest xray  CXR      CT      CARDIOLOGY TESTING      MEDICATIONS  amLODIPine   Tablet 10  ARIPiprazole 30  clindamycin   Capsule 300  dicyclomine 10  enoxaparin Injectable 40  escitalopram 20  famotidine    Tablet 20  gabapentin 600  propylthiouracil 100  propylthiouracil 50  topiramate 100  trimethoprim  160 mG/sulfamethoxazole 800 mG 2      ANTIBIOTICS:  clindamycin   Capsule 300 milliGRAM(s) Oral four times a day  trimethoprim  160 mG/sulfamethoxazole 800 mG 2 Tablet(s) Oral two times a day

## 2019-06-11 NOTE — H&P ADULT - HISTORY OF PRESENT ILLNESS
(Patient very sleepy, used chart information too) (Patient very sleepy, used chart information too) 28y (Patient very sleepy, used chart information too) 27yo female presented to ER for evaluation of infection to left arm. She told ER that she used that arm to inject drugs a few days ago. Actually came to the ER 3 days ago, received diagnosis of and was started on oral antibiotics Augmentin and bactrim). However area of involvement apparently swelled up to size of a "baseball" and she developed fever of 101 so she returned to ER. While there an I + D was done by staff

## 2019-06-11 NOTE — DISCHARGE NOTE PROVIDER - CARE PROVIDERS DIRECT ADDRESSES
,DirectAddress_Unknown,[email or web address],DirectAddress_Unknown,DirectAddress_Unknown

## 2019-06-11 NOTE — CHART NOTE - NSCHARTNOTEFT_GEN_A_CORE
PT SEEN BY SURGICAL TEAM , WITH DR. SUSAN HOWARD     BEDSIDE I+D PREFORMED , NECROTIC TISSUE REMOVED FROM WOUND     DRESSING APPLIED:  4X4 GAUZE IN WOUND TO KEEP WOUND OPEN AND COVERED WITH BLAYNE WRAP.    PT MAY GO HOME WITH PO ABX, AND CHANGE DRESSINGS TWICE  A DAY , PT MAY SHOWER    IF WOUND GETS WORSE WITH ERYTHEMA , INCREASE PUS , FEVER OVER 102 , REPORT TO ER       ABOVE D/W DR. SAL - [facility]

## 2019-06-11 NOTE — DISCHARGE NOTE PROVIDER - NSDCFUADDINST_GEN_ALL_CORE_FT
Continue local wound care Twice daily: 4x4 Gauze packing to keep wound open. Cover with Sanjay wrap.      IF WOUND GETS WORSE WITH INCREASING REDNESS & PUS DRAINAGE OR FEVER OVER 102, REPORT TO ER IMMEDIATELY.

## 2019-06-11 NOTE — DISCHARGE NOTE PROVIDER - HOSPITAL COURSE
28 year Female with Morbidly obese, active IV drug abuse with Heroin and Crack cocaine use, ADHD, HTN, Hepatitis C, Depression, PCOS (polycystic ovarian syndrome)    & Hyperthyroidism admitted with left upper arm abscess and Cellulitis. She had been injecting drugs in her left arm a few days ago when she noticed some redness and swelling at the site. She was seen in ER 3 days ago and was given Augmentin and Bactrim but returned due to worsening symptoms with fevers up to 101F at home.    In the ED she had an I&D with drainage of pus. She was admitted for Further management.            Assessment and Plan:     1. Left Upper extremity Abscess & cellulitis:    s/p I & D 6/11/19.    Started on IV Doxycycline.    ID consulted: Abscess most likely due to Staph aureus. Recommended Bactrim DS 2 tabs 2x daily and Clindamycin 300mg 4x a day for 7 days .    Surgery consulted: bedside debridement done with wound packing. s/p IV Vancomycin 1500 mg.    Continued local wound care Twice daily: 4x4 Gauze packing to keep wound open. Cover with Sanjay wrap.            2. Morbid Obesity:    BMI 63.2    Life style and dietary modification advised.            3. PCOS (polycystic ovarian syndrome):    Resumed Metformin on Discharge.            4. Acute IVDA/Opioid Dependence:    Counselling offered.    Outpatient follow up at [facility]. Continue methadone low dose to prevent withdrawal.             5. ADHD:    Stable. Continued home medications.            6. Hepatitis C:    RNA undetectable 3/2019.            7. HTN (hypertension):    Stable Amlodipine.            8. Hyperthyroidism:    Continued Propylthiouracil. 28 year Female with Morbidly obese, active IV drug abuse with Heroin (last use 6 years ago) and Crack cocaine use 6 days ago, ADHD, HTN, Hepatitis C, Depression, PCOS (polycystic ovarian syndrome) & Hyperthyroidism admitted with left upper arm abscess and Cellulitis. She had been injecting drugs in her left arm a few days ago when she noticed some redness and swelling at the site. She was seen in ER 3 days ago and was given Augmentin and Bactrim but returned due to worsening symptoms with fevers up to 101F at home.    In the ED she had an I&D with drainage of pus. She was admitted for Further management.            Assessment and Plan:     1. Left Upper extremity Abscess & cellulitis:    s/p I & D 6/11/19.    Started on IV Doxycycline.    ID consulted: Abscess most likely due to Staph aureus. Recommended Bactrim DS 2 tabs 2x daily and Clindamycin 300mg 4x a day for 7 days .    Surgery consulted: bedside debridement done with wound packing. s/p IV Vancomycin 1500 mg.    Continued local wound care Twice daily: 4x4 Gauze packing to keep wound open. Cover with Sanjay wrap.            2. Morbid Obesity:    BMI 63.2    Life style and dietary modification advised.            3. PCOS (polycystic ovarian syndrome):    Resumed Metformin on Discharge.            4. Acute IVDA with crack cocaine:    Counselling offered.    Outpatient follow up [facility].             5. ADHD:    Stable. Continued home medications.            6. Hepatitis C:    RNA undetectable 3/2019.            7. HTN (hypertension):    Stable Amlodipine.            8. Hyperthyroidism:    Continued Propylthiouracil. 28 year Female with Morbidly obese, active IV drug abuse with Heroin (last use 6 years ago) and Crack cocaine use 6 days ago, ADHD, HTN, Hepatitis C, Depression, PCOS (polycystic ovarian syndrome) & Hyperthyroidism admitted with left upper arm abscess and Cellulitis. She had been injecting drugs in her left arm a few days ago when she noticed some redness and swelling at the site. She was seen in ER 3 days ago and was given Augmentin and Bactrim but returned due to worsening symptoms with fevers up to 101F at home.    In the ED she had an I&D with drainage of pus. She was admitted for Further management.            Patient admitted with Left Upper extremity Abscess & cellulitis:    s/p I & D 6/11/19.    Started on IV Doxycycline.     ID consulted: Abscess most likely due to Staph aureus. Recommended Bactrim DS 2 tabs 2x daily and Clindamycin 300mg 4x a day for 7 days .    Surgery consulted: bedside debridement done with wound packing. s/p IV Vancomycin 1500 mg.    Continued local wound care Twice daily: 4x4 Gauze packing to keep wound open. Cover with Sanjay wrap.            Other medical conditions -         Morbid Obesity:    BMI 63.2    Life style and dietary modification advised.        PCOS (polycystic ovarian syndrome): on metformin            Acute IVDA with crack cocaine:    Counselling offered.    Outpatient follow up [facility].             ADHD:    Stable. Continued home medications.            Hepatitis C:    RNA undetectable 3/2019.        HTN (hypertension):    Stable Amlodipine.            Hyperthyroidism:    Continued Propylthiouracil.        Dispo: medically stable for d/c today

## 2019-06-11 NOTE — ED PROVIDER NOTE - OBJECTIVE STATEMENT
28 year female past medical history of IVDA states that she shot up drugs in her left arm a few days and was seen in emergency room 3 days ago and was given augmentin and bactrim. patient states that she returns today because now its the size of a baseball and has redness surrounding and she has been having 101 fevers at home.

## 2019-06-11 NOTE — H&P ADULT - PROBLEM SELECTOR PLAN 9
Patient cannot verify her EMR medication list in particular concerned about thyoid medications and current listed oral antibiotics. Need to verify all meds when more awake Patient clarified medications this am

## 2019-06-11 NOTE — DISCHARGE NOTE PROVIDER - NSDCCPCAREPLAN_GEN_ALL_CORE_FT
PRINCIPAL DISCHARGE DIAGNOSIS  Diagnosis: Cutaneous abscess of left upper extremity  Assessment and Plan of Treatment: Continue Antibiotics as prescribed.   Continued local wound care Twice daily: 4x4 Gauze packing to keep wound open. Cover with Sanjay wrap.      SECONDARY DISCHARGE DIAGNOSES  Diagnosis: Hypertension  Assessment and Plan of Treatment: Continue Amlodipine.    Diagnosis: Hyperthyroidism  Assessment and Plan of Treatment: Continue Propylthiouracil.    Diagnosis: Intravenous drug abuse  Assessment and Plan of Treatment: Counselling offered.  Outpatient follow up at [facility].

## 2019-06-11 NOTE — H&P ADULT - NSICDXPASTMEDICALHX_GEN_ALL_CORE_FT
PAST MEDICAL HISTORY:  Adult ADHD     Cocaine abuse     Depression     Hepatitis C     HTN (hypertension)     Hyperthyroidism     IV drug abuse     Obesity     Occasional tremors     Opioid abuse     PCOS (polycystic ovarian syndrome)

## 2019-06-11 NOTE — ED PROVIDER NOTE - NS ED ROS FT
Constitutional: (+) fever  Eyes/ENT: (-) blurry vision, (-) epistaxis  Cardiovascular: (-) chest pain, (-) syncope  Respiratory: (-) cough, (-) shortness of breath  Gastrointestinal: (-) vomiting, (-) diarrhea  Musculoskeletal: (-) neck pain, (-) back pain, (-) joint pain  Integumentary: +abscess  Neurological: (-) headache, (-) altered mental status  Psychiatric: (-) hallucinations  Allergic/Immunologic: (-) pruritus

## 2019-06-11 NOTE — DISCHARGE NOTE PROVIDER - PROVIDER TOKENS
FREE:[LAST:[PMD],PHONE:[(   )    -],FAX:[(   )    -]],PROVIDER:[TOKEN:[[id number]]] PROVIDER:[TOKEN:[[id number]]],FREE:[LAST:[PMD],PHONE:[(   )    -],FAX:[(   )    -]],FREE:[LAST:[Central intake],PHONE:[phone]],FAX:[(   )    -],FOLLOWUP:[1-3 days]]

## 2019-06-11 NOTE — CONSULT NOTE ADULT - PROBLEM SELECTOR RECOMMENDATION 9
likely Staph aureus  DC planning   PO abx - 7 days  new issue - s/p drainage and improving     NO BACTRIM allergy per pt   recall if needed

## 2019-06-11 NOTE — CONSULT NOTE ADULT - SUBJECTIVE AND OBJECTIVE BOX
This is a 28y patient admitted for   abcess left arm Surgery called to evaluate pt post i and d         Pmhx: drug use  Psurghx  Allergy: Bactrim (Pruritus; Rash)  BuSpar (Rash)  clonidine (Other (Mild))  Lamictal (Rash)  Nicotine Patch (Rash (Mild))    Meds:   SH:       Vital Signs Last 24 Hrs  T(C): 37 (10 Gallo 2019 22:15), Max: 37 (10 Gallo 2019 22:15)  T(F): 98.6 (10 Gallo 2019 22:15), Max: 98.6 (10 Gallo 2019 22:15)  HR: 88 (10 Gallo 2019 22:15) (88 - 88)  BP: 145/86 (10 Gallo 2019 22:15) (145/86 - 145/86)  BP(mean): --  RR: 16 (10 Gallo 2019 22:15) (16 - 16)  SpO2: 100% (10 Gallo 2019 22:15) (100% - 100%)      PE:  GEN: pt in no acute distress  Left arm abcess  CV: [id number]  Lungs: + air entry bilat  abd: soft no rebound tendernesss  no guarding  ext: no calf tenderness  neuro: alert and oriented                            12.5   12.22 )-----------( 331      ( 10 Gallo 2019 23:35 )             38.4       A/P : 27 y/o s/p I and  D  of left arm abcess This is a 28y F patient admitted for   abscess left arm Surgery called to evaluate pt post i and d in ED        Pmhx: drug use  Psurghx  Allergy: Bactrim (Pruritus; Rash)  BuSpar (Rash)  clonidine (Other (Mild))  Lamictal (Rash)  Nicotine Patch (Rash (Mild))    Meds:   SH:       Vital Signs Last 24 Hrs  T(C): 37 (10 Gallo 2019 22:15), Max: 37 (10 Gallo 2019 22:15)  T(F): 98.6 (10 Gallo 2019 22:15), Max: 98.6 (10 Gallo 2019 22:15)  HR: 88 (10 Gallo 2019 22:15) (88 - 88)  BP: 145/86 (10 Gallo 2019 22:15) (145/86 - 145/86)  RR: 16 (10 Gallo 2019 22:15) (16 - 16)  SpO2: 100% (10 Gallo 2019 22:15) (100% - 100%)      PE:  GEN: Obese built female, uncomfortable, but pt in no acute distress  Left arm abcess with 1.5 cm wound medial aspect of upper arm with slight necrosis at edge of skin with underlying necrotic tissue and fat necrosis with surrondin erythema for 5 cm around the center  CV: [id number]  Lungs: + air entry bilat  abd: soft no rebound tenderness  no guarding  ext: no calf tenderness  neuro: alert and oriented                            12.5   12.22 )-----------( 331      ( 10 Gallo 2019 23:35 )             38.4       A/P : 29 y/o s/p I and  D  of left arm abcess

## 2019-06-11 NOTE — ED PROVIDER NOTE - CLINICAL SUMMARY MEDICAL DECISION MAKING FREE TEXT BOX
Pt pw  left  arm cellulitis  ans abscess  x 1 week  after IV injection use.   pt has been on 2 Augmentin and bactrim compliant  for 3 days with worsening  of symptoms- now with fever 101   I and D in ED with  purulent outpt,  IV abx  given  Doxycycline  admitted -  surgical consult

## 2019-06-11 NOTE — CONSULT NOTE ADULT - ATTENDING COMMENTS
Patient seen and examined with surgery PA on rounds and discussed management plans with patient and hospitalist. Necrotic tissue was debrided by bedside, sono no collection.

## 2019-06-11 NOTE — ED PROVIDER NOTE - PHYSICAL EXAMINATION
Physical Exam    Vital Signs: I have reviewed the initial vital signs.  Constitutional: well-nourished, appears stated age, no acute distress  Eyes: Conjunctiva pink, Sclera clear, PERRLA, EOMI.  Cardiovascular: S1 and S2, regular rate, regular rhythm, well-perfused extremities, radial pulses equal and 2+  Respiratory: unlabored respiratory effort, clear to auscultation bilaterally no wheezing, rales and rhonchi  Gastrointestinal: soft, non-tender abdomen, no pulsatile mass, normal bowl sounds  Musculoskeletal: supple neck, no lower extremity edema, no midline tenderness  Integumentary: warm, dry, + left upper arm abscess large [id number] with surrounding erythema warmth and tenderness.   Neurologic: awake, alert, cranial nerves II-XII grossly intact, extremities’ motor and sensory functions grossly intact  Psychiatric: appropriate mood, appropriate affect

## 2019-06-12 ENCOUNTER — TRANSCRIPTION ENCOUNTER (OUTPATIENT)
Age: 29
End: 2019-06-12

## 2019-06-12 VITALS
TEMPERATURE: 98 F | RESPIRATION RATE: 16 BRPM | SYSTOLIC BLOOD PRESSURE: 113 MMHG | DIASTOLIC BLOOD PRESSURE: 62 MMHG | HEART RATE: 91 BPM

## 2019-06-12 DIAGNOSIS — E05.90 THYROTOXICOSIS, UNSPECIFIED WITHOUT THYROTOXIC CRISIS OR STORM: ICD-10-CM

## 2019-06-12 PROCEDURE — 99239 HOSP IP/OBS DSCHRG MGMT >30: CPT

## 2019-06-12 RX ADMIN — Medication 10 MILLIGRAM(S): at 06:24

## 2019-06-12 RX ADMIN — Medication 100 MILLIGRAM(S): at 05:48

## 2019-06-12 RX ADMIN — Medication 300 MILLIGRAM(S): at 05:48

## 2019-06-12 RX ADMIN — FAMOTIDINE 20 MILLIGRAM(S): 10 INJECTION INTRAVENOUS at 05:48

## 2019-06-12 RX ADMIN — AMLODIPINE BESYLATE 10 MILLIGRAM(S): 2.5 TABLET ORAL at 05:47

## 2019-06-12 RX ADMIN — Medication 100 MILLIGRAM(S): at 07:52

## 2019-06-12 RX ADMIN — Medication 2 TABLET(S): at 05:48

## 2019-06-12 RX ADMIN — GABAPENTIN 600 MILLIGRAM(S): 400 CAPSULE ORAL at 05:48

## 2019-06-12 NOTE — PROGRESS NOTE ADULT - SUBJECTIVE AND OBJECTIVE BOX
LYNDON SMITH  28y  Female      Patient is a 28y old  Female who presents with a chief complaint of left arm cellulitis, abscess (11 Jun 2019 11:43)      INTERVAL HPI/OVERNIGHT EVENTS:  Patient seen and examined lying comfortably in bed.  In NAD.  s/p I&D yesterday - no complaints today  Afebrile for 24hrs      REVIEW OF SYSTEMS:  CONSTITUTIONAL: No fever, weight loss, or fatigue  EYES: No eye pain, visual disturbances, or discharge  ENMT:  No difficulty hearing, tinnitus, vertigo; No sinus or throat pain  NECK: No pain or stiffness  RESPIRATORY: No cough, wheezing, chills or hemoptysis; No shortness of breath  CARDIOVASCULAR: No chest pain, palpitations, dizziness, or leg swelling  GASTROINTESTINAL: No abdominal or epigastric pain. No nausea, vomiting, or hematemesis; No diarrhea or constipation. No melena or hematochezia.  GENITOURINARY: No dysuria, frequency, hematuria, or incontinence  NEUROLOGICAL: No headaches, memory loss, loss of strength, numbness, or tremors  SKIN: LUE arm wrapped in area of cellulitis  LYMPH NODES: No enlarged glands  ENDOCRINE: No heat or cold intolerance; No hair loss  MUSCULOSKELETAL: No joint pain or swelling; No muscle, back, or extremity pain  PSYCHIATRIC: No depression, anxiety, mood swings, or difficulty sleeping  HEME/LYMPH: No easy bruising, or bleeding gums  ALLERY AND IMMUNOLOGIC: No hives or eczema    T(C): 36.7 (06-12-19 @ 05:39), Max: 36.7 (06-11-19 @ 14:02)  HR: 91 (06-12-19 @ 05:39) (91 - 94)  BP: 113/62 (06-12-19 @ 05:39) (105/54 - 113/62)  RR: 16 (06-12-19 @ 05:39) (16 - 16)  SpO2: --    PHYSICAL EXAM:  GENERAL: NAD, well-groomed, well-developed  HEAD:  Atraumatic, Normocephalic  EYES: EOMI, PERRLA, conjunctiva and sclera clear  ENMT: No tonsillar erythema, exudates, or enlargement; Moist mucous membranes, Good dentition, No lesions  NECK: Supple, No JVD, Normal thyroid  NERVOUS SYSTEM:  Alert & Oriented X3, Good concentration; Motor Strength 5/5 B/L upper and lower extremities; DTRs 2+ intact and symmetric  CHEST/LUNG: Clear to percussion bilaterally; No rales, rhonchi, wheezing, or rubs  HEART: Regular rate and rhythm; No murmurs, rubs, or gallops  ABDOMEN: Soft, Nontender, Nondistended; Bowel sounds present  EXTREMITIES:  2+ Peripheral Pulses, No clubbing, cyanosis, or edema  LYMPH: No lymphadenopathy noted  SKIN: LUE wrapped    Consultant(s) Notes Reviewed:  [x ] YES  [ ] NO  Care Discussed with Consultants/Other Providers [ x] YES  [ ] NO    LAB:  Abnormal labs discussed with pt    06-10    138  |  105  |  13  ----------------------------<  107<H>  4.0   |  23  |  0.9    Ca    8.9      10 Gallo 2019 23:35                            12.5   12.22 )-----------( 331      ( 10 Gallo 2019 23:35 )             38.4       Drug Dosing Weight  Height (cm): 170.18 (11 Jun 2019 01:40)  Weight (kg): 183 (11 Jun 2019 01:40)  BMI (kg/m2): 63.2 (11 Jun 2019 01:40)  BSA (m2): 2.72 (11 Jun 2019 01:40)  CAPILLARY BLOOD GLUCOSE      RADIOLOGY & ADDITIONAL TESTS:    Imaging Personally Reviewed:  [x] YES  [ ] NO    HEALTH ISSUES - PROBLEM Dx:  Medication management: Medication management  Cellulitis of left upper extremity: Cellulitis of left upper extremity  HTN (hypertension): HTN (hypertension)  Hepatitis C: Hepatitis C  Adult ADHD: Adult ADHD  Intravenous drug abuse: Intravenous drug abuse  PCOS (polycystic ovarian syndrome): PCOS (polycystic ovarian syndrome)  Obesity: Obesity  Abscess: Abscess          MEDS:  amLODIPine   Tablet 10 milliGRAM(s) Oral daily  ARIPiprazole 30 milliGRAM(s) Oral at bedtime  clindamycin   Capsule 300 milliGRAM(s) Oral four times a day  dicyclomine 10 milliGRAM(s) Oral two times a day before meals  enoxaparin Injectable 40 milliGRAM(s) SubCutaneous every 24 hours  escitalopram 20 milliGRAM(s) Oral daily  famotidine    Tablet 20 milliGRAM(s) Oral two times a day  gabapentin 600 milliGRAM(s) Oral two times a day  hydrOXYzine hydrochloride 25 milliGRAM(s) Oral four times a day PRN  propylthiouracil 100 milliGRAM(s) Oral daily  propylthiouracil 50 milliGRAM(s) Oral <User Schedule>  propylthiouracil 100 milliGRAM(s) Oral <User Schedule>  topiramate 100 milliGRAM(s) Oral two times a day  trimethoprim  160 mG/sulfamethoxazole 800 mG 2 Tablet(s) Oral two times a day      Progress Note Handoff  Pending:  Consults none  Tests none  Results none  Family Discussion: d/c home today- pt aware and in agreement with the plan  Disposition: Home__x_/SNF____/Other______________/Unknown at this time_____    Please call me with any questions at extension [phone]

## 2019-06-12 NOTE — PROGRESS NOTE ADULT - PROBLEM SELECTOR PLAN 1
continue oral abx as per ID - clinda and bactrim  dressing change prior to d/c today  surgery follow up as outpt   s/p I&D yesterday

## 2019-06-12 NOTE — DISCHARGE NOTE NURSING/CASE MANAGEMENT/SOCIAL WORK - NSDCDPATPORTLINK_GEN_ALL_CORE
You can access the [facility] Patient Portal, offered by [facility], by registering with the following website: [email or web address]

## 2019-06-13 PROBLEM — Z00.00 ENCOUNTER FOR PREVENTIVE HEALTH EXAMINATION: Status: ACTIVE | Noted: 2019-06-13

## 2019-06-13 LAB
-  AMPICILLIN/SULBACTAM: SIGNIFICANT CHANGE UP
-  CEFAZOLIN: SIGNIFICANT CHANGE UP
-  CLINDAMYCIN: SIGNIFICANT CHANGE UP
-  ERYTHROMYCIN: SIGNIFICANT CHANGE UP
-  GENTAMICIN: SIGNIFICANT CHANGE UP
-  OXACILLIN: SIGNIFICANT CHANGE UP
-  PENICILLIN: SIGNIFICANT CHANGE UP
-  RIFAMPIN: SIGNIFICANT CHANGE UP
-  TETRACYCLINE: SIGNIFICANT CHANGE UP
-  TRIMETHOPRIM/SULFAMETHOXAZOLE: SIGNIFICANT CHANGE UP
-  VANCOMYCIN: SIGNIFICANT CHANGE UP
METHOD TYPE: SIGNIFICANT CHANGE UP

## 2019-06-16 LAB
CULTURE RESULTS: SIGNIFICANT CHANGE UP
ORGANISM # SPEC MICROSCOPIC CNT: SIGNIFICANT CHANGE UP
ORGANISM # SPEC MICROSCOPIC CNT: SIGNIFICANT CHANGE UP
SPECIMEN SOURCE: SIGNIFICANT CHANGE UP

## 2019-06-17 ENCOUNTER — APPOINTMENT (OUTPATIENT)
Dept: SURGERY | Facility: CLINIC | Age: 29
End: 2019-06-17
Payer: MEDICARE

## 2019-06-17 VITALS
WEIGHT: 293 LBS | DIASTOLIC BLOOD PRESSURE: 80 MMHG | BODY MASS INDEX: 45.99 KG/M2 | SYSTOLIC BLOOD PRESSURE: 140 MMHG | HEIGHT: 67 IN

## 2019-06-17 DIAGNOSIS — Z87.09 PERSONAL HISTORY OF OTHER DISEASES OF THE RESPIRATORY SYSTEM: ICD-10-CM

## 2019-06-17 DIAGNOSIS — Z86.2 PERSONAL HISTORY OF DISEASES OF THE BLOOD AND BLOOD-FORMING ORGANS AND CERTAIN DISORDERS INVOLVING THE IMMUNE MECHANISM: ICD-10-CM

## 2019-06-17 DIAGNOSIS — F17.200 NICOTINE DEPENDENCE, UNSPECIFIED, UNCOMPLICATED: ICD-10-CM

## 2019-06-17 DIAGNOSIS — B19.20 UNSPECIFIED VIRAL HEPATITIS C W/OUT HEPATIC COMA: ICD-10-CM

## 2019-06-17 DIAGNOSIS — E11.9 TYPE 2 DIABETES MELLITUS W/OUT COMPLICATIONS: ICD-10-CM

## 2019-06-17 DIAGNOSIS — I10 ESSENTIAL (PRIMARY) HYPERTENSION: ICD-10-CM

## 2019-06-17 DIAGNOSIS — F17.210 NICOTINE DEPENDENCE, CIGARETTES, UNCOMPLICATED: ICD-10-CM

## 2019-06-17 DIAGNOSIS — F19.11 OTHER PSYCHOACTIVE SUBSTANCE ABUSE, IN REMISSION: ICD-10-CM

## 2019-06-17 DIAGNOSIS — Z86.59 PERSONAL HISTORY OF OTHER MENTAL AND BEHAVIORAL DISORDERS: ICD-10-CM

## 2019-06-17 DIAGNOSIS — L02.419 CUTANEOUS ABSCESS OF LIMB, UNSPECIFIED: ICD-10-CM

## 2019-06-17 DIAGNOSIS — Z86.39 PERSONAL HISTORY OF OTHER ENDOCRINE, NUTRITIONAL AND METABOLIC DISEASE: ICD-10-CM

## 2019-06-17 DIAGNOSIS — Z48.89 ENCOUNTER FOR OTHER SPECIFIED SURGICAL AFTERCARE: ICD-10-CM

## 2019-06-17 PROCEDURE — 99211 OFF/OP EST MAY X REQ PHY/QHP: CPT

## 2019-06-17 RX ORDER — GABAPENTIN 600 MG/1
600 TABLET, COATED ORAL
Refills: 0 | Status: ACTIVE | COMMUNITY

## 2019-06-17 RX ORDER — METFORMIN HYDROCHLORIDE 1000 MG/1
1000 TABLET, COATED ORAL
Refills: 0 | Status: ACTIVE | COMMUNITY

## 2019-06-17 RX ORDER — BUPROPION HYDROCHLORIDE 100 MG/1
TABLET, FILM COATED ORAL
Refills: 0 | Status: ACTIVE | COMMUNITY

## 2019-06-17 RX ORDER — TOPIRAMATE 100 MG/1
100 TABLET, FILM COATED ORAL
Refills: 0 | Status: ACTIVE | COMMUNITY

## 2019-06-17 RX ORDER — ARIPIPRAZOLE 2 MG/1
TABLET ORAL
Refills: 0 | Status: ACTIVE | COMMUNITY

## 2019-06-17 RX ORDER — AMLODIPINE BESYLATE 10 MG/1
10 TABLET ORAL
Refills: 0 | Status: ACTIVE | COMMUNITY

## 2019-06-17 RX ORDER — GABAPENTIN 300 MG/1
300 CAPSULE ORAL
Refills: 0 | Status: ACTIVE | COMMUNITY

## 2019-06-17 NOTE — REASON FOR VISIT
[Post Op: _________] : a [unfilled] post op visit [Parent] : parent [Source: ________] : History obtained from [unfilled] [FreeTextEntry1] : Carlota is being see today for postop after  abscess in left upper arm

## 2019-06-17 NOTE — ASSESSMENT
[FreeTextEntry1] : 28-year-old male with status post abscess of the left arm improving slowly. Patient was told to stop packing of the wound and return to the office if any problems

## 2019-06-17 NOTE — CHART NOTE - NSCHARTNOTEFT_GEN_A_CORE
Received abscess culture results - moderate staph aureus sensitive to bactrim and clinda that the pt was discharged on.

## 2019-06-17 NOTE — REVIEW OF SYSTEMS
[Anxiety] : anxiety [Depression] : depression [Fever] : no fever [Chills] : no chills [Chest Pain] : no chest pain [Shortness Of Breath] : no shortness of breath [Abdominal Pain] : no abdominal pain [Vomiting] : no vomiting [Constipation] : no constipation [Diarrhea] : no diarrhea [Dizziness] : no dizziness

## 2019-06-17 NOTE — PHYSICAL EXAM
[de-identified] : An obese built female in no acute distress ambulatory accompanied by the family [de-identified] : 2 cm x 1 cm open wound over the left anterior upper arm. Wound clean with condition tissue no significant depth of wound noted. No signs of cellulitis or infection surrounding the area

## 2019-06-17 NOTE — HISTORY OF PRESENT ILLNESS
[de-identified] : 28-year-old female was hospitalized at [facility] for left arm abscess postinjection. Patient underwent local incision and drainage of abscess and was discharged and antibiotics and now returns for followup. Patient denies any fever or chills and the wound is improving [de-identified] : Significant increase in the pain and drainage of left upper arm

## 2019-06-21 NOTE — ED ADULT NURSE NOTE - CAS EDP DISCH TYPE
[facility]  Physical Medicine & Rehab  Progress Note    Patient Name: Shahid Lackey  MRN: [id number]  Admission Date: 6/19/2019  Length of Stay: 2 days  Attending Physician: Nelson Uribe MD    Subjective:     Principal Problem:Nontraumatic cortical hemorrhage of cerebral hemisphere    Hospital Course:   06/20/2019: PT- Bed mobility Max-TA x 2 ppl. Passed bedside swallow evaluation.  SLP recommending dental soft diet and thin liquids.SLP diagnosis of Cognitive-Linguistic Impairment and Visio-Spatial Impairment.  OT evaluation pending.   6/21/19: OT evaluation pending.     Interval History 6/21/2019:  Patient is seen for follow-up rehab evaluation and recommendations: OT evaluatio pending. Angiogram performed yesterday.    HPI, Past Medical, Family, and Social History remains the same as documented in the initial encounter.    Scheduled Medications:    amLODIPine  5 mg Oral Daily    folic acid  1 mg Oral Daily    levetiracetam IVPB  500 mg Intravenous Q12H    lidocaine-EPINEPHrine 1%-1:100,000  10 mL Intradermal Once    senna-docusate 8.6-50 mg  1 tablet Oral BID    thiamine  100 mg Oral Daily       Diagnostic Results: Labs: Reviewed    PRN Medications: calcium gluconate IVPB, calcium gluconate IVPB, calcium gluconate IVPB, hydrALAZINE, magnesium sulfate IVPB, magnesium sulfate IVPB, midazolam, ondansetron, potassium chloride in water **AND** potassium chloride in water **AND** potassium chloride in water, buffered 2% sodium acetate 86meq, sodium chloride 86meq, sterile water for inj IV soln, sodium chloride 0.9%, sodium chloride 0.9%, sodium phosphate IVPB, sodium phosphate IVPB, sodium phosphate IVPB    Review of Systems   Reason unable to perform ROS: AMS.     Objective:     Vital Signs (Most Recent):  Temp: 100 °F (37.8 °C) (06/21/19 0730)  Pulse: 86 (06/21/19 0900)  Resp: (!) 34 (06/21/19 0900)  BP: (!) 147/73 (06/21/19 0900)  SpO2: 99 % (06/21/19 0900)    Vital Signs (24h  "Range):  Temp:  [98.7 °F (37.1 °C)-100 °F (37.8 °C)] 100 °F (37.8 °C)  Pulse:  [] 86  Resp:  [14-63] 34  SpO2:  [95 %-100 %] 99 %  BP: (115-153)/(57-98) 147/73  Arterial Line BP: (124-151)/(61-75) 141/65     Physical Exam   Constitutional: He appears well-developed and well-nourished. He is restless.   HENT:   Head: Normocephalic and atraumatic.   Eyes: Right eye exhibits no discharge. Left eye exhibits no discharge.   Neck: Neck supple.   Cardiovascular: Normal rate and intact distal pulses.   Pulmonary/Chest: Effort normal. No respiratory distress.   Abdominal: Soft. He exhibits no distension.   Musculoskeletal: He exhibits no edema or deformity.   L sided weakness   Neurological: He is alert. He is disoriented.   Facial asymmetry   L hemianopsia    Skin: Skin is warm and dry.   Abrasions    Psychiatric: His affect is inappropriate. Cognition and memory are impaired.     Assessment/Plan:      * Nontraumatic cortical hemorrhage of cerebral hemisphere  -h/o of IV drug abuse   -large R ICH with IVH on imaging  -on 2% Na gtt  -s/p angiogram-->per radiology, "No evidence of aneurysm or AVM. Diffuse irregularity of multiple small and medium sized arteries with multiple areas of narrowing/beading raising question of CNS vasculitis or RCVS."    See hospital course for functional, cognitive/speech/language, and nutrition/swallow status.      Recommendations  -  Encourage mobility, OOB in chair at least 3 hours per day, and early ambulation as appropriate   -  PT/OT evaluate and treat  -  SLP speech and cognitive evaluate and treat  -  Monitor sleep disturbances and establish consistent sleep-wake cycle  -  Monitor for bowel and bladder dysfunction  -  Monitor for shoulder pain, subluxation, & spasticity  -  Monitor for and prevent skin breakdown and pressure ulcers  · Early mobility, repositioning/weight shifting every 20-30 minutes when sitting, turn patient every 2 hours, proper mattress/overlay and chair " cushioning, pressure relief/heel protector boots  -  Reviewed discharge options (IP rehab, SNF, HH therapy, and OP therapy)    Left hemiparesis  -PT/OT evaluate and treat    Drug abuse, IV  -h/o drug abuse  -drug screen positive for cocaine, heroin, THC, amphetamines and benzo's    OT evaluation pending. Will follow progress and discuss with rehab team for post acute care/rehab recommendation.      Felicia Marrero NP  Department of Physical Medicine & Rehab   [facility]   [facility]

## 2019-06-25 DIAGNOSIS — I10 ESSENTIAL (PRIMARY) HYPERTENSION: ICD-10-CM

## 2019-06-25 DIAGNOSIS — L02.414 CUTANEOUS ABSCESS OF LEFT UPPER LIMB: ICD-10-CM

## 2019-06-25 DIAGNOSIS — F17.210 NICOTINE DEPENDENCE, CIGARETTES, UNCOMPLICATED: ICD-10-CM

## 2019-06-25 DIAGNOSIS — F32.9 MAJOR DEPRESSIVE DISORDER, SINGLE EPISODE, UNSPECIFIED: ICD-10-CM

## 2019-06-25 DIAGNOSIS — E28.2 POLYCYSTIC OVARIAN SYNDROME: ICD-10-CM

## 2019-06-25 DIAGNOSIS — F90.9 ATTENTION-DEFICIT HYPERACTIVITY DISORDER, UNSPECIFIED TYPE: ICD-10-CM

## 2019-06-25 DIAGNOSIS — E05.90 THYROTOXICOSIS, UNSPECIFIED WITHOUT THYROTOXIC CRISIS OR STORM: ICD-10-CM

## 2019-06-25 DIAGNOSIS — L03.114 CELLULITIS OF LEFT UPPER LIMB: ICD-10-CM

## 2019-06-25 DIAGNOSIS — Z88.8 ALLERGY STATUS TO OTHER DRUGS, MEDICAMENTS AND BIOLOGICAL SUBSTANCES STATUS: ICD-10-CM

## 2019-06-25 DIAGNOSIS — E66.01 MORBID (SEVERE) OBESITY DUE TO EXCESS CALORIES: ICD-10-CM

## 2019-06-25 DIAGNOSIS — F11.20 OPIOID DEPENDENCE, UNCOMPLICATED: ICD-10-CM

## 2019-06-25 DIAGNOSIS — B95.61 METHICILLIN SUSCEPTIBLE STAPHYLOCOCCUS AUREUS INFECTION AS THE CAUSE OF DISEASES CLASSIFIED ELSEWHERE: ICD-10-CM

## 2019-06-25 DIAGNOSIS — B19.20 UNSPECIFIED VIRAL HEPATITIS C WITHOUT HEPATIC COMA: ICD-10-CM

## 2019-07-15 ENCOUNTER — INPATIENT (INPATIENT)
Facility: HOSPITAL | Age: 29
LOS: 3 days | Discharge: LEFT BEFORE DISCHARGE | End: 2019-07-19
Attending: INTERNAL MEDICINE | Admitting: INTERNAL MEDICINE
Payer: MEDICARE

## 2019-07-15 VITALS
SYSTOLIC BLOOD PRESSURE: 113 MMHG | RESPIRATION RATE: 16 BRPM | HEIGHT: 67 IN | HEART RATE: 94 BPM | DIASTOLIC BLOOD PRESSURE: 68 MMHG | TEMPERATURE: 98 F

## 2019-07-15 DIAGNOSIS — Z86.69 PERSONAL HISTORY OF OTHER DISEASES OF THE NERVOUS SYSTEM AND SENSE ORGANS: Chronic | ICD-10-CM

## 2019-07-15 DIAGNOSIS — B19.20 UNSPECIFIED VIRAL HEPATITIS C WITHOUT HEPATIC COMA: ICD-10-CM

## 2019-07-15 DIAGNOSIS — F17.200 NICOTINE DEPENDENCE, UNSPECIFIED, UNCOMPLICATED: ICD-10-CM

## 2019-07-15 DIAGNOSIS — F11.20 OPIOID DEPENDENCE, UNCOMPLICATED: ICD-10-CM

## 2019-07-15 DIAGNOSIS — I10 ESSENTIAL (PRIMARY) HYPERTENSION: ICD-10-CM

## 2019-07-15 DIAGNOSIS — E11.9 TYPE 2 DIABETES MELLITUS WITHOUT COMPLICATIONS: ICD-10-CM

## 2019-07-15 DIAGNOSIS — F14.20 COCAINE DEPENDENCE, UNCOMPLICATED: ICD-10-CM

## 2019-07-15 DIAGNOSIS — E05.90 THYROTOXICOSIS, UNSPECIFIED WITHOUT THYROTOXIC CRISIS OR STORM: ICD-10-CM

## 2019-07-15 DIAGNOSIS — Z90.89 ACQUIRED ABSENCE OF OTHER ORGANS: Chronic | ICD-10-CM

## 2019-07-15 DIAGNOSIS — F31.9 BIPOLAR DISORDER, UNSPECIFIED: ICD-10-CM

## 2019-07-15 DIAGNOSIS — J45.909 UNSPECIFIED ASTHMA, UNCOMPLICATED: ICD-10-CM

## 2019-07-15 LAB
ALBUMIN SERPL ELPH-MCNC: 3.5 G/DL — SIGNIFICANT CHANGE UP (ref 3.5–5.2)
ALP SERPL-CCNC: 64 U/L — SIGNIFICANT CHANGE UP (ref 30–115)
ALT FLD-CCNC: 20 U/L — SIGNIFICANT CHANGE UP (ref 0–41)
AMMONIA BLD-MCNC: 45 UMOL/L — SIGNIFICANT CHANGE UP (ref 11–55)
ANION GAP SERPL CALC-SCNC: 13 MMOL/L — SIGNIFICANT CHANGE UP (ref 7–14)
APPEARANCE UR: CLEAR — SIGNIFICANT CHANGE UP
AST SERPL-CCNC: 21 U/L — SIGNIFICANT CHANGE UP (ref 0–41)
BACTERIA # UR AUTO: ABNORMAL
BASOPHILS # BLD AUTO: 0.02 K/UL — SIGNIFICANT CHANGE UP (ref 0–0.2)
BASOPHILS NFR BLD AUTO: 0.2 % — SIGNIFICANT CHANGE UP (ref 0–1)
BILIRUB SERPL-MCNC: 0.3 MG/DL — SIGNIFICANT CHANGE UP (ref 0.2–1.2)
BILIRUB UR-MCNC: NEGATIVE — SIGNIFICANT CHANGE UP
BUN SERPL-MCNC: 15 MG/DL — SIGNIFICANT CHANGE UP (ref 10–20)
CALCIUM SERPL-MCNC: 9 MG/DL — SIGNIFICANT CHANGE UP (ref 8.5–10.1)
CHLORIDE SERPL-SCNC: 106 MMOL/L — SIGNIFICANT CHANGE UP (ref 98–110)
CHOLEST SERPL-MCNC: 145 MG/DL — SIGNIFICANT CHANGE UP (ref 100–200)
CO2 SERPL-SCNC: 22 MMOL/L — SIGNIFICANT CHANGE UP (ref 17–32)
COD CRY URNS QL: ABNORMAL
COLOR SPEC: YELLOW — SIGNIFICANT CHANGE UP
CREAT SERPL-MCNC: 0.8 MG/DL — SIGNIFICANT CHANGE UP (ref 0.7–1.5)
DIFF PNL FLD: NEGATIVE — SIGNIFICANT CHANGE UP
DRUG SCREEN 1, URINE RESULT: SIGNIFICANT CHANGE UP
EOSINOPHIL # BLD AUTO: 0.29 K/UL — SIGNIFICANT CHANGE UP (ref 0–0.7)
EOSINOPHIL NFR BLD AUTO: 3.1 % — SIGNIFICANT CHANGE UP (ref 0–8)
EPI CELLS # UR: ABNORMAL /HPF
ETHANOL SERPL-MCNC: <10 MG/DL — SIGNIFICANT CHANGE UP
GGT SERPL-CCNC: 20 U/L — SIGNIFICANT CHANGE UP (ref 1–40)
GLUCOSE BLDC GLUCOMTR-MCNC: 99 MG/DL — SIGNIFICANT CHANGE UP (ref 70–99)
GLUCOSE SERPL-MCNC: 119 MG/DL — HIGH (ref 70–99)
GLUCOSE UR QL: NEGATIVE MG/DL — SIGNIFICANT CHANGE UP
HCG UR QL: NEGATIVE — SIGNIFICANT CHANGE UP
HCT VFR BLD CALC: 38.6 % — SIGNIFICANT CHANGE UP (ref 37–47)
HDLC SERPL-MCNC: 32 MG/DL — LOW
HGB BLD-MCNC: 12.5 G/DL — SIGNIFICANT CHANGE UP (ref 12–16)
IMM GRANULOCYTES NFR BLD AUTO: 0.3 % — SIGNIFICANT CHANGE UP (ref 0.1–0.3)
KETONES UR-MCNC: ABNORMAL
LEUKOCYTE ESTERASE UR-ACNC: ABNORMAL
LIPID PNL WITH DIRECT LDL SERPL: 104 MG/DL — SIGNIFICANT CHANGE UP (ref 4–129)
LYMPHOCYTES # BLD AUTO: 2.13 K/UL — SIGNIFICANT CHANGE UP (ref 1.2–3.4)
LYMPHOCYTES # BLD AUTO: 22.4 % — SIGNIFICANT CHANGE UP (ref 20.5–51.1)
MAGNESIUM SERPL-MCNC: 2 MG/DL — SIGNIFICANT CHANGE UP (ref 1.8–2.4)
MCHC RBC-ENTMCNC: 26.4 PG — LOW (ref 27–31)
MCHC RBC-ENTMCNC: 32.4 G/DL — SIGNIFICANT CHANGE UP (ref 32–37)
MCV RBC AUTO: 81.6 FL — SIGNIFICANT CHANGE UP (ref 81–99)
MONOCYTES # BLD AUTO: 0.6 K/UL — SIGNIFICANT CHANGE UP (ref 0.1–0.6)
MONOCYTES NFR BLD AUTO: 6.3 % — SIGNIFICANT CHANGE UP (ref 1.7–9.3)
NEUTROPHILS # BLD AUTO: 6.42 K/UL — SIGNIFICANT CHANGE UP (ref 1.4–6.5)
NEUTROPHILS NFR BLD AUTO: 67.7 % — SIGNIFICANT CHANGE UP (ref 42.2–75.2)
NITRITE UR-MCNC: POSITIVE
NRBC # BLD: 0 /100 WBCS — SIGNIFICANT CHANGE UP (ref 0–0)
PH UR: 6 — SIGNIFICANT CHANGE UP (ref 5–8)
PLATELET # BLD AUTO: 315 K/UL — SIGNIFICANT CHANGE UP (ref 130–400)
POTASSIUM SERPL-MCNC: 3.9 MMOL/L — SIGNIFICANT CHANGE UP (ref 3.5–5)
POTASSIUM SERPL-SCNC: 3.9 MMOL/L — SIGNIFICANT CHANGE UP (ref 3.5–5)
PROT SERPL-MCNC: 6.9 G/DL — SIGNIFICANT CHANGE UP (ref 6–8)
PROT UR-MCNC: 30 MG/DL
RBC # BLD: 4.73 M/UL — SIGNIFICANT CHANGE UP (ref 4.2–5.4)
RBC # FLD: 15.1 % — HIGH (ref 11.5–14.5)
SODIUM SERPL-SCNC: 141 MMOL/L — SIGNIFICANT CHANGE UP (ref 135–146)
SP GR SPEC: 1.02 — SIGNIFICANT CHANGE UP (ref 1.01–1.03)
TOTAL CHOLESTEROL/HDL RATIO MEASUREMENT: 4.5 RATIO — SIGNIFICANT CHANGE UP (ref 4–5.5)
TRIGL SERPL-MCNC: 174 MG/DL — HIGH (ref 10–149)
UROBILINOGEN FLD QL: 0.2 MG/DL — SIGNIFICANT CHANGE UP (ref 0.2–0.2)
WBC # BLD: 9.49 K/UL — SIGNIFICANT CHANGE UP (ref 4.8–10.8)
WBC # FLD AUTO: 9.49 K/UL — SIGNIFICANT CHANGE UP (ref 4.8–10.8)
WBC UR QL: SIGNIFICANT CHANGE UP /HPF

## 2019-07-15 RX ORDER — ACETAMINOPHEN 500 MG
650 TABLET ORAL EVERY 4 HOURS
Refills: 0 | Status: DISCONTINUED | OUTPATIENT
Start: 2019-07-15 | End: 2019-07-19

## 2019-07-15 RX ORDER — METHADONE HYDROCHLORIDE 40 MG/1
15 TABLET ORAL ONCE
Refills: 0 | Status: DISCONTINUED | OUTPATIENT
Start: 2019-07-15 | End: 2019-07-15

## 2019-07-15 RX ORDER — HYDROXYZINE HCL 10 MG
50 TABLET ORAL EVERY 6 HOURS
Refills: 0 | Status: DISCONTINUED | OUTPATIENT
Start: 2019-07-15 | End: 2019-07-19

## 2019-07-15 RX ORDER — PROPYLTHIOURACIL 50 MG
50 TABLET ORAL
Refills: 0 | Status: DISCONTINUED | OUTPATIENT
Start: 2019-07-15 | End: 2019-07-19

## 2019-07-15 RX ORDER — BUPROPION HYDROCHLORIDE 150 MG/1
150 TABLET, EXTENDED RELEASE ORAL DAILY
Refills: 0 | Status: DISCONTINUED | OUTPATIENT
Start: 2019-07-15 | End: 2019-07-19

## 2019-07-15 RX ORDER — MULTIVIT-MIN/FERROUS GLUCONATE 9 MG/15 ML
1 LIQUID (ML) ORAL DAILY
Refills: 0 | Status: DISCONTINUED | OUTPATIENT
Start: 2019-07-15 | End: 2019-07-19

## 2019-07-15 RX ORDER — GLUCAGON INJECTION, SOLUTION 0.5 MG/.1ML
1 INJECTION, SOLUTION SUBCUTANEOUS ONCE
Refills: 0 | Status: DISCONTINUED | OUTPATIENT
Start: 2019-07-15 | End: 2019-07-19

## 2019-07-15 RX ORDER — DEXTROSE 50 % IN WATER 50 %
25 SYRINGE (ML) INTRAVENOUS ONCE
Refills: 0 | Status: DISCONTINUED | OUTPATIENT
Start: 2019-07-15 | End: 2019-07-19

## 2019-07-15 RX ORDER — METHADONE HYDROCHLORIDE 40 MG/1
10 TABLET ORAL EVERY 12 HOURS
Refills: 0 | Status: DISCONTINUED | OUTPATIENT
Start: 2019-07-16 | End: 2019-07-18

## 2019-07-15 RX ORDER — PSEUDOEPHEDRINE HCL 30 MG
60 TABLET ORAL EVERY 6 HOURS
Refills: 0 | Status: DISCONTINUED | OUTPATIENT
Start: 2019-07-15 | End: 2019-07-19

## 2019-07-15 RX ORDER — METFORMIN HYDROCHLORIDE 850 MG/1
1000 TABLET ORAL
Refills: 0 | Status: DISCONTINUED | OUTPATIENT
Start: 2019-07-15 | End: 2019-07-19

## 2019-07-15 RX ORDER — METHADONE HYDROCHLORIDE 40 MG/1
15 TABLET ORAL EVERY 12 HOURS
Refills: 0 | Status: DISCONTINUED | OUTPATIENT
Start: 2019-07-15 | End: 2019-07-16

## 2019-07-15 RX ORDER — INSULIN LISPRO 100/ML
VIAL (ML) SUBCUTANEOUS
Refills: 0 | Status: DISCONTINUED | OUTPATIENT
Start: 2019-07-15 | End: 2019-07-16

## 2019-07-15 RX ORDER — DEXTROSE 50 % IN WATER 50 %
12.5 SYRINGE (ML) INTRAVENOUS ONCE
Refills: 0 | Status: DISCONTINUED | OUTPATIENT
Start: 2019-07-15 | End: 2019-07-19

## 2019-07-15 RX ORDER — TOPIRAMATE 25 MG
100 TABLET ORAL
Refills: 0 | Status: DISCONTINUED | OUTPATIENT
Start: 2019-07-15 | End: 2019-07-19

## 2019-07-15 RX ORDER — MAGNESIUM HYDROXIDE 400 MG/1
30 TABLET, CHEWABLE ORAL ONCE
Refills: 0 | Status: DISCONTINUED | OUTPATIENT
Start: 2019-07-15 | End: 2019-07-19

## 2019-07-15 RX ORDER — METHADONE HYDROCHLORIDE 40 MG/1
TABLET ORAL
Refills: 0 | Status: DISCONTINUED | OUTPATIENT
Start: 2019-07-15 | End: 2019-07-19

## 2019-07-15 RX ORDER — PROPYLTHIOURACIL 50 MG
100 TABLET ORAL
Refills: 0 | Status: DISCONTINUED | OUTPATIENT
Start: 2019-07-15 | End: 2019-07-19

## 2019-07-15 RX ORDER — FAMOTIDINE 10 MG/ML
20 INJECTION INTRAVENOUS
Refills: 0 | Status: DISCONTINUED | OUTPATIENT
Start: 2019-07-15 | End: 2019-07-19

## 2019-07-15 RX ORDER — TOPIRAMATE 25 MG
1 TABLET ORAL
Qty: 0 | Refills: 0 | DISCHARGE

## 2019-07-15 RX ORDER — HYDROXYZINE HCL 10 MG
100 TABLET ORAL AT BEDTIME
Refills: 0 | Status: DISCONTINUED | OUTPATIENT
Start: 2019-07-15 | End: 2019-07-19

## 2019-07-15 RX ORDER — AMLODIPINE BESYLATE 2.5 MG/1
10 TABLET ORAL DAILY
Refills: 0 | Status: DISCONTINUED | OUTPATIENT
Start: 2019-07-15 | End: 2019-07-19

## 2019-07-15 RX ORDER — ALBUTEROL 90 UG/1
0 AEROSOL, METERED ORAL
Qty: 18 | Refills: 0 | DISCHARGE

## 2019-07-15 RX ORDER — HYDROXYZINE HCL 10 MG
1 TABLET ORAL
Qty: 0 | Refills: 0 | DISCHARGE

## 2019-07-15 RX ORDER — METHADONE HYDROCHLORIDE 40 MG/1
5 TABLET ORAL EVERY 12 HOURS
Refills: 0 | Status: DISCONTINUED | OUTPATIENT
Start: 2019-07-18 | End: 2019-07-19

## 2019-07-15 RX ORDER — IBUPROFEN 200 MG
400 TABLET ORAL EVERY 6 HOURS
Refills: 0 | Status: DISCONTINUED | OUTPATIENT
Start: 2019-07-15 | End: 2019-07-19

## 2019-07-15 RX ORDER — SODIUM CHLORIDE 9 MG/ML
1000 INJECTION, SOLUTION INTRAVENOUS
Refills: 0 | Status: DISCONTINUED | OUTPATIENT
Start: 2019-07-15 | End: 2019-07-19

## 2019-07-15 RX ORDER — GUAIFENESIN/DEXTROMETHORPHAN 600MG-30MG
5 TABLET, EXTENDED RELEASE 12 HR ORAL EVERY 4 HOURS
Refills: 0 | Status: DISCONTINUED | OUTPATIENT
Start: 2019-07-15 | End: 2019-07-19

## 2019-07-15 RX ORDER — GABAPENTIN 400 MG/1
600 CAPSULE ORAL AT BEDTIME
Refills: 0 | Status: DISCONTINUED | OUTPATIENT
Start: 2019-07-15 | End: 2019-07-19

## 2019-07-15 RX ORDER — DEXTROSE 50 % IN WATER 50 %
15 SYRINGE (ML) INTRAVENOUS ONCE
Refills: 0 | Status: DISCONTINUED | OUTPATIENT
Start: 2019-07-15 | End: 2019-07-19

## 2019-07-15 RX ORDER — ALBUTEROL 90 UG/1
2 AEROSOL, METERED ORAL EVERY 6 HOURS
Refills: 0 | Status: DISCONTINUED | OUTPATIENT
Start: 2019-07-15 | End: 2019-07-19

## 2019-07-15 RX ORDER — METHOCARBAMOL 500 MG/1
500 TABLET, FILM COATED ORAL EVERY 6 HOURS
Refills: 0 | Status: DISCONTINUED | OUTPATIENT
Start: 2019-07-15 | End: 2019-07-19

## 2019-07-15 RX ADMIN — Medication 100 MILLIGRAM(S): at 21:02

## 2019-07-15 RX ADMIN — FAMOTIDINE 20 MILLIGRAM(S): 10 INJECTION INTRAVENOUS at 21:05

## 2019-07-15 RX ADMIN — METHADONE HYDROCHLORIDE 15 MILLIGRAM(S): 40 TABLET ORAL at 21:02

## 2019-07-15 RX ADMIN — GABAPENTIN 600 MILLIGRAM(S): 400 CAPSULE ORAL at 21:03

## 2019-07-15 RX ADMIN — Medication 50 MILLIGRAM(S): at 21:03

## 2019-07-15 RX ADMIN — Medication 100 MILLIGRAM(S): at 21:03

## 2019-07-15 RX ADMIN — METFORMIN HYDROCHLORIDE 1000 MILLIGRAM(S): 850 TABLET ORAL at 21:04

## 2019-07-15 RX ADMIN — METHADONE HYDROCHLORIDE 15 MILLIGRAM(S): 40 TABLET ORAL at 16:07

## 2019-07-15 NOTE — H&P ADULT - NSHPPHYSICALEXAM_GEN_ALL_CORE
-  Vital Signs:      Temp98.6:       Pulse: 84        RR:  16      BP:  110/68    Physical Exam:              Constitutional: +Anxious A&Ox3, W/N and W/D.  HEENT: NC/AT, PERRLA, EOM Intact, Nares normal, No Sinus tenderness.  Lips, mucosa and tongue normal; Neck supple, No adenopathy  Respiratory: CTAB, no rales, no rhonchi, no wheezes  Cardiovascular: +S1S2, No M/R/G  Gastrointestinal: +BS, soft, non-tender, not distended, No CVAT  Extremities: Atraumatic, no cyanosis, no edema, no calf tenderness,  Vascular: +dorsal pedis and radial pulses, no extremity cyanosis  Neurological: sensation intact, ROM equal B/L, CN II-XII intact, Gait: steady  Skin: no rashes, no lesions, normal turgor, + track marks@B/L UE  No Decubiti present  No IV lines present  Rectal/Breasts Exam: Deferred

## 2019-07-15 NOTE — H&P ADULT - PROBLEM SELECTOR PLAN 8
C/W  Home Medications:  buPROPion 150 mg/24 hours (XL) oral tablet, extended release: 1 tab(s) orally every 24 hours (15 Jul 2019 11:14)  gabapentin 600 mg oral tablet: 1 tab(s) orally once a day (at bedtime) (15 Jul 2019 11:14)  Topamax 100 mg oral tablet: 1 tab(s) orally 3 times a day (15 Jul 2019 11:14)  f/u with jeannine gonzalez OPD for next dose in Aug: Abilify Maintena: orally every 4 weeks (15 Jul 2019 11:14),

## 2019-07-15 NOTE — H&P ADULT - PROBLEM SELECTOR PLAN 4
c/w:  Metformin 1000mg PO BID  2200 kcal ADA diet  f.s with insulin coverage,  monitor vss & pt  f/u with an outpatient

## 2019-07-15 NOTE — H&P ADULT - ASSESSMENT
28y Female with continuous Opioids use disorder presents for detox admission.  Patient admits to abusing Heroin daily for the past 4-5 months.  Patient endorses feeling of anxiety, insomnia, body aches, nausea, poor appetite, hot and chills intermittently and tremors.  Denies H/O seizure or AVH  H/O overdose x 1 about 5 months ago.

## 2019-07-15 NOTE — H&P ADULT - ATTENDING COMMENTS
Patient interviewed and examined.    Chart reviewed.    PA's H&P noted and modified, as appropriate.    Case discussed on team rounds    Following is my summary of the case.    Admitted for detox: from ____ED, _x__Intake, ____Med/Surg Floor    Alcohol____   Opioid__x___  Benzo___ Other_____    Substance amount, duration of use, last usage, and prior attempts at detox or rehabs, are outlined above in the H&P and discussed with patient.    Associated withdrawal symptoms presents.  Comorbid conditions noted. Chronic and Stable.    Past Medical Hx, Psych Hx, family Hx, Social Hx from H&P reviewed and NO changes.    Old medical record and medication Hx. Reviewed    Following items reviewed and addressed:  1. labs  2. EKG  3. Imaging from PACs module    Examination: no change from PA's exam.    Place on following protocol  _____Medically Managed  __X__Medically Supervised    Ciwa_____Librium taper____Ativan taper___Methadone taper_x__ Phenobarb taper____ Suboxone Induction____MMTP____    Narcan Kit Offered    Psych Consult __X__N/A  ___Ordered    Physical Therapy  ___X  n/a    ___  Ordered    Aftercare disposition to be addressed by counselors.    Estimated length of stay 3-5 days.

## 2019-07-15 NOTE — H&P ADULT - PROBLEM SELECTOR PLAN 9
Nicotine patch-patient refused for h/o skin irritation.  Smoking Cessation advised  Smoking Education provided

## 2019-07-15 NOTE — H&P ADULT - NSICDXPASTMEDICALHX_GEN_ALL_CORE_FT
PAST MEDICAL HISTORY:  Adult ADHD     Asthma     Cocaine abuse     Depression     Hepatitis C     HTN (hypertension)     Hyperthyroidism     IV drug abuse     Obesity     Occasional tremors     Opioid abuse     PCOS (polycystic ovarian syndrome)     T2DM (type 2 diabetes mellitus)

## 2019-07-15 NOTE — H&P ADULT - HISTORY OF PRESENT ILLNESS
28y Female with continuous Opioids use disorder presents for detox admission.  Patient admits to abusing Heroin daily for the past 4-5 months.  Patient endorses feeling of anxiety, insomnia, body aches, nausea, poor appetite, hot and chills intermittently and tremors.  Denies H/O seizure or AVH  H/O overdose x 1 about 5 months ago.  Patient A&Ox3, denies CP, SOB, headache, dizziness, bleeding and dysuria. Denies recent fall or injury  LMP: 3 weeks ago, Last papsmear 6 months ago WNL  Patient admits to abusing current substances as follows:  DRUG	AGE OF ONSET	ROUTE	FREQ	AMOUNT	LAST USE	LENGTH OF CURRENT USE	  Heroin	23	IV	Daily	20 bags	7/14/19 4-5 bags	2-3 months	  Crack	21	Smoking	Daily	$300-400	7/15/19@2am $200	2-3 months	  Crsytal Meth	23		2x /week		2 days ago		  							  							  I-Stop: Neg	  Last Inpatient Detox: 4/2018  Hx of Withdrawal Seizures: No   Psyhx: Anxiety and Bipolar with depression. Patient reports H/O suicidal attempt x 3, last SA at her age of 21 yo by OD  Denies current S/H Ideation or A/V Hallucination  Is patient currently receiving methadone from an MMTP: No.    Screening history	Last tested	Result	History of treatment	  HIV	2019	NEG	N/A	  Hepatitis C	2018	POS	Never	  Quantiferon GOLD TB test	7/16/18	NEG	N/A	    Immunization	Not Received	Unknown	Received	Date Received 	  Influenza			v	2018	  Pneumococcus		v			  Tetanus			v	<10y	  Others

## 2019-07-16 LAB
AMPHET UR-MCNC: NEGATIVE — SIGNIFICANT CHANGE UP
BARBITURATES UR SCN-MCNC: NEGATIVE — SIGNIFICANT CHANGE UP
BENZODIAZ UR-MCNC: NEGATIVE — SIGNIFICANT CHANGE UP
COCAINE METAB.OTHER UR-MCNC: POSITIVE
ESTIMATED AVERAGE GLUCOSE: 114 MG/DL — SIGNIFICANT CHANGE UP (ref 68–114)
GLUCOSE BLDC GLUCOMTR-MCNC: 96 MG/DL — SIGNIFICANT CHANGE UP (ref 70–99)
HAV IGM SER-ACNC: SIGNIFICANT CHANGE UP
HBA1C BLD-MCNC: 5.6 % — SIGNIFICANT CHANGE UP (ref 4–5.6)
HBV CORE IGM SER-ACNC: SIGNIFICANT CHANGE UP
HBV SURFACE AG SER-ACNC: SIGNIFICANT CHANGE UP
HCV AB S/CO SERPL IA: 7.43 S/CO — HIGH (ref 0–0.99)
HCV AB SERPL-IMP: REACTIVE
HIV 1+2 AB+HIV1 P24 AG SERPL QL IA: SIGNIFICANT CHANGE UP
METHADONE UR-MCNC: NEGATIVE — SIGNIFICANT CHANGE UP
OPIATES UR-MCNC: POSITIVE
PCP UR-MCNC: NEGATIVE — SIGNIFICANT CHANGE UP
PROPOXYPHENE QUALITATIVE URINE RESULT: NEGATIVE — SIGNIFICANT CHANGE UP
T PALLIDUM AB TITR SER: NEGATIVE — SIGNIFICANT CHANGE UP
T4 AB SER-ACNC: 7.7 UG/DL — SIGNIFICANT CHANGE UP (ref 4.6–12)
THC UR QL: POSITIVE
TSH SERPL-MCNC: <0.01 UIU/ML — LOW (ref 0.27–4.2)

## 2019-07-16 RX ADMIN — Medication 100 MILLIGRAM(S): at 21:01

## 2019-07-16 RX ADMIN — Medication 100 MILLIGRAM(S): at 14:13

## 2019-07-16 RX ADMIN — METFORMIN HYDROCHLORIDE 1000 MILLIGRAM(S): 850 TABLET ORAL at 09:14

## 2019-07-16 RX ADMIN — FAMOTIDINE 20 MILLIGRAM(S): 10 INJECTION INTRAVENOUS at 11:53

## 2019-07-16 RX ADMIN — METHOCARBAMOL 500 MILLIGRAM(S): 500 TABLET, FILM COATED ORAL at 14:14

## 2019-07-16 RX ADMIN — Medication 50 MILLIGRAM(S): at 21:01

## 2019-07-16 RX ADMIN — METHADONE HYDROCHLORIDE 10 MILLIGRAM(S): 40 TABLET ORAL at 21:01

## 2019-07-16 RX ADMIN — AMLODIPINE BESYLATE 10 MILLIGRAM(S): 2.5 TABLET ORAL at 09:14

## 2019-07-16 RX ADMIN — Medication 1 TABLET(S): at 09:15

## 2019-07-16 RX ADMIN — GABAPENTIN 600 MILLIGRAM(S): 400 CAPSULE ORAL at 21:01

## 2019-07-16 RX ADMIN — BUPROPION HYDROCHLORIDE 150 MILLIGRAM(S): 150 TABLET, EXTENDED RELEASE ORAL at 09:14

## 2019-07-16 RX ADMIN — Medication 100 MILLIGRAM(S): at 09:18

## 2019-07-16 RX ADMIN — Medication 400 MILLIGRAM(S): at 14:14

## 2019-07-16 RX ADMIN — Medication 100 MILLIGRAM(S): at 11:53

## 2019-07-16 RX ADMIN — METHADONE HYDROCHLORIDE 15 MILLIGRAM(S): 40 TABLET ORAL at 09:13

## 2019-07-16 RX ADMIN — METFORMIN HYDROCHLORIDE 1000 MILLIGRAM(S): 850 TABLET ORAL at 21:03

## 2019-07-16 RX ADMIN — FAMOTIDINE 20 MILLIGRAM(S): 10 INJECTION INTRAVENOUS at 21:01

## 2019-07-17 DIAGNOSIS — F17.210 NICOTINE DEPENDENCE, CIGARETTES, UNCOMPLICATED: ICD-10-CM

## 2019-07-17 DIAGNOSIS — F14.20 COCAINE DEPENDENCE, UNCOMPLICATED: ICD-10-CM

## 2019-07-17 LAB
GAMMA INTERFERON BACKGROUND BLD IA-ACNC: 0.03 IU/ML — SIGNIFICANT CHANGE UP
HCV RNA FLD QL NAA+PROBE: SIGNIFICANT CHANGE UP
HCV RNA SPEC QL PROBE+SIG AMP: SIGNIFICANT CHANGE UP
M TB IFN-G BLD-IMP: NEGATIVE — SIGNIFICANT CHANGE UP
M TB IFN-G CD4+ BCKGRND COR BLD-ACNC: 0.01 IU/ML — SIGNIFICANT CHANGE UP
M TB IFN-G CD4+CD8+ BCKGRND COR BLD-ACNC: 0.01 IU/ML — SIGNIFICANT CHANGE UP
QUANT TB PLUS MITOGEN MINUS NIL: 7.56 IU/ML — SIGNIFICANT CHANGE UP

## 2019-07-17 PROCEDURE — 99221 1ST HOSP IP/OBS SF/LOW 40: CPT

## 2019-07-17 RX ORDER — NITROFURANTOIN MACROCRYSTAL 50 MG
100 CAPSULE ORAL
Refills: 0 | Status: DISCONTINUED | OUTPATIENT
Start: 2019-07-17 | End: 2019-07-19

## 2019-07-17 RX ADMIN — BUPROPION HYDROCHLORIDE 150 MILLIGRAM(S): 150 TABLET, EXTENDED RELEASE ORAL at 08:33

## 2019-07-17 RX ADMIN — Medication 100 MILLIGRAM(S): at 08:34

## 2019-07-17 RX ADMIN — Medication 100 MILLIGRAM(S): at 08:35

## 2019-07-17 RX ADMIN — FAMOTIDINE 20 MILLIGRAM(S): 10 INJECTION INTRAVENOUS at 08:33

## 2019-07-17 RX ADMIN — AMLODIPINE BESYLATE 10 MILLIGRAM(S): 2.5 TABLET ORAL at 08:33

## 2019-07-17 RX ADMIN — Medication 1 TABLET(S): at 08:33

## 2019-07-17 RX ADMIN — METFORMIN HYDROCHLORIDE 1000 MILLIGRAM(S): 850 TABLET ORAL at 21:06

## 2019-07-17 RX ADMIN — Medication 50 MILLIGRAM(S): at 21:08

## 2019-07-17 RX ADMIN — GABAPENTIN 600 MILLIGRAM(S): 400 CAPSULE ORAL at 21:08

## 2019-07-17 RX ADMIN — ALBUTEROL 2 PUFF(S): 90 AEROSOL, METERED ORAL at 08:39

## 2019-07-17 RX ADMIN — Medication 100 MILLIGRAM(S): at 18:10

## 2019-07-17 RX ADMIN — Medication 100 MILLIGRAM(S): at 14:00

## 2019-07-17 RX ADMIN — FAMOTIDINE 20 MILLIGRAM(S): 10 INJECTION INTRAVENOUS at 21:07

## 2019-07-17 RX ADMIN — METHADONE HYDROCHLORIDE 10 MILLIGRAM(S): 40 TABLET ORAL at 21:09

## 2019-07-17 RX ADMIN — METHADONE HYDROCHLORIDE 10 MILLIGRAM(S): 40 TABLET ORAL at 08:35

## 2019-07-17 RX ADMIN — Medication 100 MILLIGRAM(S): at 21:07

## 2019-07-17 RX ADMIN — METFORMIN HYDROCHLORIDE 1000 MILLIGRAM(S): 850 TABLET ORAL at 08:33

## 2019-07-17 NOTE — BEHAVIORAL HEALTH ASSESSMENT NOTE - HPI (INCLUDE ILLNESS QUALITY, SEVERITY, DURATION, TIMING, CONTEXT, MODIFYING FACTORS, ASSOCIATED SIGNS AND SYMPTOMS)
28 yrs. old white female with opoid, Crystal Meth and crack cocaine dependence admitted to detox for gabapentin tapering. Pt. stated she did not like gabapentin and wanted to stop. She stated she was talking with his father on telephone and was crying and so called psychiatrist. She stated that she was followed Uma Patel NP and stable on  Abilify Maintaina 20 mg. She is feeling depressed but not hopeless or hopeless. She has no intention to harm her self or others. She denied current suicidal ideation ,plan or intention. Her current medications has been working well. She was last hospitalized when she was 20 yrs. old. She denied auditory or visual hallucination or paranoid ideation.

## 2019-07-17 NOTE — CHART NOTE - NSCHARTNOTEFT_GEN_A_CORE
Subsequent Inpatient Encounter                                       Detox Unit    LYNDON SMITH   28y   Female      Chief Complaint:    Follow up for Opiate  Dependency    HPI:     I reviewed previous notes. No Change, except if noted below.             Detail:_    ROS:   I reviewed with patient.  No changes from previous notes except if noted below.             Detail: _    PFSH I reviewed with patient. No changes from previous notes except if noted below.             Detail_    Medication reconciliation performed.    MEDICATIONS  (STANDING):  amLODIPine   Tablet 10 milliGRAM(s) Oral daily  buPROPion XL . 150 milliGRAM(s) Oral daily  dextrose 5%. 1000 milliLiter(s) (50 mL/Hr) IV Continuous <Continuous>  dextrose 50% Injectable 12.5 Gram(s) IV Push once  dextrose 50% Injectable 25 Gram(s) IV Push once  dextrose 50% Injectable 25 Gram(s) IV Push once  famotidine    Tablet 20 milliGRAM(s) Oral two times a day  gabapentin 600 milliGRAM(s) Oral at bedtime  metFORMIN 1000 milliGRAM(s) Oral two times a day  methadone    Tablet   Oral   methadone    Tablet 10 milliGRAM(s) Oral every 12 hours  multivitamin/minerals 1 Tablet(s) Oral daily  propylthiouracil 100 milliGRAM(s) Oral <User Schedule>  propylthiouracil 50 milliGRAM(s) Oral <User Schedule>  topiramate 100 milliGRAM(s) Oral <User Schedule>      MEDICATIONS  (PRN):  acetaminophen   Tablet .. 650 milliGRAM(s) Oral every 4 hours PRN Temp greater or equal to 38C (100.4F), Moderate Pain (4 - 6)  ALBUTerol    90 MICROgram(s) HFA Inhaler 2 Puff(s) Inhalation every 6 hours PRN Shortness of Breath and/or Wheezing  aluminum hydroxide/magnesium hydroxide/simethicone Suspension 30 milliLiter(s) Oral every 6 hours PRN Heartburn  bismuth subsalicylate Liquid 30 milliLiter(s) Oral every 6 hours PRN Diarrhea  dextrose 40% Gel 15 Gram(s) Oral once PRN Blood Glucose LESS THAN 70 milliGRAM(s)/deciliter  glucagon  Injectable 1 milliGRAM(s) IntraMuscular once PRN Glucose LESS THAN 70 milligrams/deciliter  guaiFENesin/dextromethorphan  Syrup 5 milliLiter(s) Oral every 4 hours PRN Cough  hydrOXYzine hydrochloride 50 milliGRAM(s) Oral every 6 hours PRN Anxiety  hydrOXYzine hydrochloride 100 milliGRAM(s) Oral at bedtime PRN insomnia  ibuprofen  Tablet. 400 milliGRAM(s) Oral every 6 hours PRN Mild Pain (1 - 3)  magnesium hydroxide Suspension 30 milliLiter(s) Oral once PRN Constipation  methocarbamol 500 milliGRAM(s) Oral every 6 hours PRN muscle pain  pseudoephedrine 60 milliGRAM(s) Oral every 6 hours PRN Rhinitis  trimethobenzamide 300 milliGRAM(s) Oral every 6 hours PRN Nausea and/or Vomiting  trimethobenzamide Injectable 200 milliGRAM(s) IntraMuscular every 6 hours PRN Nausea and/or Vomiting      T(C): 35.8 (19 @ 06:00), Max: 36.6 (19 @ 11:45)  HR: 73 (19 @ 06:00) (73 - 92)  BP: 111/57 (19 @ 06:00) (106/51 - 113/56)  RR: 16 (19 @ 06:00) (14 - 18)  SpO2: --    PHYSICAL EXAM:      Constitutional: NAD, A&O x3    Eyes: PERRLA, no conjuctivitis    Neck: no lymphadenopathy    Respiratory: +air entry, no rales, no rhonchi, no wheezes    Cardiovascular: +S1 and S2, regular rate and rhythm    Gastrointestinal: +BS, soft, non-tender, not distended    Extremities:  no edema, no calf tenderness    Skin: no rashes, normal turgor                            12.5   9.49  )-----------( 315      ( 15 Jul 2019 15:49 )             38.6   07-15    141  |  106  |  15  ----------------------------<  119<H>  3.9   |  22  |  0.8    Ca    9.0      15 Jul 2019 15:49  Mg     2.0     07-15    TPro  6.9  /  Alb  3.5  /  TBili  0.3  /  DBili  x   /  AST  21  /  ALT  20  /  AlkPhos  64  07-15    Magnesium, Serum: 2.0 mg/dL (07-15-19 @ 15:49)  Ammonia, Serum: 45 umol/L (07-15-19 @ 15:49)  Hemoglobin A1C, Whole Blood: 5.6 % (19 @ 09:06)  Treponema Pallidum Antibody Interpretation: Negative (07-15-19 @ 15:49)  Hepatitis B Surface Antigen: Nonreact (07-15-19 @ 15:49)  Hepatitis C Virus S/CO Ratio: 7.43 S/CO (07-15-19 @ 15:49)    Hepatitis C Virus Interpretation: Reactive (07-15-19 @ 15:49)      Urinalysis Basic - ( 15 Jul 2019 14:14 )    Color: Yellow / Appearance: Clear / S.025 / pH: x  Gluc: x / Ketone: Trace  / Bili: Negative / Urobili: 0.2 mg/dL   Blood: x / Protein: 30 mg/dL / Nitrite: Positive   Leuk Esterase: Small / RBC: x / WBC 1-2 /HPF   Sq Epi: x / Non Sq Epi: Many /HPF / Bacteria: Many    Drug Screen 1, Urine Result: Done (07-15-19 @ 14:14)        Impression and Plan:    Primary Diagnosis:  Opiate Dependency                                Medication: Methadone Protocol    Secondary Diagnosis: HTN                                                   Medication: c/w current meds; monitor b/p    Tertiary Diagnosis: Bipolar D/O                                             Medication: c/w current meds; psych c/s PRN      Continue Detox Protocols. Use of PRNS as needed for withdrawal and comfort.    Adjustments to protocols:    Labs/ Tests reviewed.    Tests ordered:     Likely Disposition: ___Home       ___Rehab       ___Outpatient Program    ___Self Help     _____Other    Estimated Length of stay:__5__ Subsequent Inpatient Encounter                                       Detox Unit    LYNDON SMITH   28y   Female      Chief Complaint: dysuria, uti    Follow up for Opiate  Dependency    HPI:     I reviewed previous notes. No Change, except if noted below.             Detail:_    ROS:   I reviewed with patient.  No changes from previous notes except if noted below.             Detail: _    PFSH I reviewed with patient. No changes from previous notes except if noted below.             Detail_    Medication reconciliation performed.    MEDICATIONS  (STANDING):  amLODIPine   Tablet 10 milliGRAM(s) Oral daily  buPROPion XL . 150 milliGRAM(s) Oral daily  dextrose 5%. 1000 milliLiter(s) (50 mL/Hr) IV Continuous <Continuous>  dextrose 50% Injectable 12.5 Gram(s) IV Push once  dextrose 50% Injectable 25 Gram(s) IV Push once  dextrose 50% Injectable 25 Gram(s) IV Push once  famotidine    Tablet 20 milliGRAM(s) Oral two times a day  gabapentin 600 milliGRAM(s) Oral at bedtime  metFORMIN 1000 milliGRAM(s) Oral two times a day  methadone    Tablet   Oral   methadone    Tablet 10 milliGRAM(s) Oral every 12 hours  multivitamin/minerals 1 Tablet(s) Oral daily  propylthiouracil 100 milliGRAM(s) Oral <User Schedule>  propylthiouracil 50 milliGRAM(s) Oral <User Schedule>  topiramate 100 milliGRAM(s) Oral <User Schedule>      MEDICATIONS  (PRN):  acetaminophen   Tablet .. 650 milliGRAM(s) Oral every 4 hours PRN Temp greater or equal to 38C (100.4F), Moderate Pain (4 - 6)  ALBUTerol    90 MICROgram(s) HFA Inhaler 2 Puff(s) Inhalation every 6 hours PRN Shortness of Breath and/or Wheezing  aluminum hydroxide/magnesium hydroxide/simethicone Suspension 30 milliLiter(s) Oral every 6 hours PRN Heartburn  bismuth subsalicylate Liquid 30 milliLiter(s) Oral every 6 hours PRN Diarrhea  dextrose 40% Gel 15 Gram(s) Oral once PRN Blood Glucose LESS THAN 70 milliGRAM(s)/deciliter  glucagon  Injectable 1 milliGRAM(s) IntraMuscular once PRN Glucose LESS THAN 70 milligrams/deciliter  guaiFENesin/dextromethorphan  Syrup 5 milliLiter(s) Oral every 4 hours PRN Cough  hydrOXYzine hydrochloride 50 milliGRAM(s) Oral every 6 hours PRN Anxiety  hydrOXYzine hydrochloride 100 milliGRAM(s) Oral at bedtime PRN insomnia  ibuprofen  Tablet. 400 milliGRAM(s) Oral every 6 hours PRN Mild Pain (1 - 3)  magnesium hydroxide Suspension 30 milliLiter(s) Oral once PRN Constipation  methocarbamol 500 milliGRAM(s) Oral every 6 hours PRN muscle pain  pseudoephedrine 60 milliGRAM(s) Oral every 6 hours PRN Rhinitis  trimethobenzamide 300 milliGRAM(s) Oral every 6 hours PRN Nausea and/or Vomiting  trimethobenzamide Injectable 200 milliGRAM(s) IntraMuscular every 6 hours PRN Nausea and/or Vomiting      T(C): 35.8 (19 @ 06:00), Max: 36.6 (19 @ 11:45)  HR: 73 (19 @ 06:00) (73 - 92)  BP: 111/57 (19 @ 06:00) (106/51 - 113/56)  RR: 16 (19 @ 06:00) (14 - 18)  SpO2: --    PHYSICAL EXAM:      Constitutional: NAD, A&O x3    Eyes: PERRLA, no conjuctivitis    Neck: no lymphadenopathy    Respiratory: +air entry, no rales, no rhonchi, no wheezes    Cardiovascular: +S1 and S2, regular rate and rhythm    Gastrointestinal: +BS, soft, non-tender, not distended    Extremities:  no edema, no calf tenderness    Skin: no rashes, normal turgor                            12.5   9.49  )-----------( 315      ( 15 Jul 2019 15:49 )             38.6   07-15    141  |  106  |  15  ----------------------------<  119<H>  3.9   |  22  |  0.8    Ca    9.0      15 Jul 2019 15:49  Mg     2.0     07-15    TPro  6.9  /  Alb  3.5  /  TBili  0.3  /  DBili  x   /  AST  21  /  ALT  20  /  AlkPhos  64  07-15    Magnesium, Serum: 2.0 mg/dL (07-15-19 @ 15:49)  Ammonia, Serum: 45 umol/L (07-15-19 @ 15:49)  Hemoglobin A1C, Whole Blood: 5.6 % (19 @ 09:06)  Treponema Pallidum Antibody Interpretation: Negative (07-15-19 @ 15:49)  Hepatitis B Surface Antigen: Nonreact (07-15-19 @ 15:49)  Hepatitis C Virus S/CO Ratio: 7.43 S/CO (07-15-19 @ 15:49)    Hepatitis C Virus Interpretation: Reactive (07-15-19 @ 15:49)      Urinalysis Basic - ( 15 Jul 2019 14:14 )    Color: Yellow / Appearance: Clear / S.025 / pH: x  Gluc: x / Ketone: Trace  / Bili: Negative / Urobili: 0.2 mg/dL   Blood: x / Protein: 30 mg/dL / Nitrite: Positive   Leuk Esterase: Small / RBC: x / WBC 1-2 /HPF   Sq Epi: x / Non Sq Epi: Many /HPF / Bacteria: Many    Drug Screen 1, Urine Result: Done (07-15-19 @ 14:14)        Impression and Plan:    Primary Diagnosis:  Opiate Dependency                                Medication: Methadone Protocol    Secondary Diagnosis: HTN                                                   Medication: c/w current meds; monitor b/p    Tertiary Diagnosis: Bipolar D/O                                             Medication: c/w current meds; psych c/s PRN    UTI                     start on macrobid x 7 days      Continue Detox Protocols. Use of PRNS as needed for withdrawal and comfort.    Adjustments to protocols:    Labs/ Tests reviewed.    Tests ordered:     Likely Disposition: ___Home       _X__Rehab       ___Outpatient Program    ___Self Help     _____Other    Estimated Length of stay:__5__

## 2019-07-18 RX ADMIN — AMLODIPINE BESYLATE 10 MILLIGRAM(S): 2.5 TABLET ORAL at 09:18

## 2019-07-18 RX ADMIN — Medication 1 TABLET(S): at 09:19

## 2019-07-18 RX ADMIN — METFORMIN HYDROCHLORIDE 1000 MILLIGRAM(S): 850 TABLET ORAL at 09:19

## 2019-07-18 RX ADMIN — Medication 100 MILLIGRAM(S): at 09:20

## 2019-07-18 RX ADMIN — BUPROPION HYDROCHLORIDE 150 MILLIGRAM(S): 150 TABLET, EXTENDED RELEASE ORAL at 09:18

## 2019-07-18 RX ADMIN — Medication 100 MILLIGRAM(S): at 09:19

## 2019-07-18 RX ADMIN — FAMOTIDINE 20 MILLIGRAM(S): 10 INJECTION INTRAVENOUS at 09:18

## 2019-07-18 RX ADMIN — METHADONE HYDROCHLORIDE 10 MILLIGRAM(S): 40 TABLET ORAL at 09:19

## 2019-07-18 RX ADMIN — Medication 100 MILLIGRAM(S): at 20:34

## 2019-07-18 RX ADMIN — Medication 100 MILLIGRAM(S): at 23:59

## 2019-07-18 RX ADMIN — GABAPENTIN 600 MILLIGRAM(S): 400 CAPSULE ORAL at 20:34

## 2019-07-18 RX ADMIN — METFORMIN HYDROCHLORIDE 1000 MILLIGRAM(S): 850 TABLET ORAL at 20:35

## 2019-07-18 RX ADMIN — Medication 100 MILLIGRAM(S): at 16:32

## 2019-07-18 RX ADMIN — Medication 100 MILLIGRAM(S): at 14:06

## 2019-07-18 RX ADMIN — METHADONE HYDROCHLORIDE 5 MILLIGRAM(S): 40 TABLET ORAL at 20:35

## 2019-07-18 RX ADMIN — FAMOTIDINE 20 MILLIGRAM(S): 10 INJECTION INTRAVENOUS at 20:34

## 2019-07-18 RX ADMIN — Medication 400 MILLIGRAM(S): at 16:28

## 2019-07-18 RX ADMIN — Medication 50 MILLIGRAM(S): at 20:34

## 2019-07-18 NOTE — CHART NOTE - NSCHARTNOTEFT_GEN_A_CORE
Subsequent Inpatient Encounter                                       Detox Unit    LYNDON SMITH   28y   Female      Chief Complaint: dysuria, uti    Follow up for Opiate  Dependency    HPI:     I reviewed previous notes. No Change, except if noted below.             Detail:_    ROS:   I reviewed with patient.  No changes from previous notes except if noted below.             Detail: _    PFSH I reviewed with patient. No changes from previous notes except if noted below.             Detail_    Medication reconciliation performed.    MEDICATIONS  (STANDING):  amLODIPine   Tablet 10 milliGRAM(s) Oral daily  buPROPion XL . 150 milliGRAM(s) Oral daily  dextrose 5%. 1000 milliLiter(s) (50 mL/Hr) IV Continuous <Continuous>  dextrose 50% Injectable 12.5 Gram(s) IV Push once  dextrose 50% Injectable 25 Gram(s) IV Push once  dextrose 50% Injectable 25 Gram(s) IV Push once  famotidine    Tablet 20 milliGRAM(s) Oral two times a day  gabapentin 600 milliGRAM(s) Oral at bedtime  metFORMIN 1000 milliGRAM(s) Oral two times a day  methadone    Tablet   Oral   methadone    Tablet 10 milliGRAM(s) Oral every 12 hours  multivitamin/minerals 1 Tablet(s) Oral daily  propylthiouracil 100 milliGRAM(s) Oral <User Schedule>  propylthiouracil 50 milliGRAM(s) Oral <User Schedule>  topiramate 100 milliGRAM(s) Oral <User Schedule>      MEDICATIONS  (PRN):  acetaminophen   Tablet .. 650 milliGRAM(s) Oral every 4 hours PRN Temp greater or equal to 38C (100.4F), Moderate Pain (4 - 6)  ALBUTerol    90 MICROgram(s) HFA Inhaler 2 Puff(s) Inhalation every 6 hours PRN Shortness of Breath and/or Wheezing  aluminum hydroxide/magnesium hydroxide/simethicone Suspension 30 milliLiter(s) Oral every 6 hours PRN Heartburn  bismuth subsalicylate Liquid 30 milliLiter(s) Oral every 6 hours PRN Diarrhea  dextrose 40% Gel 15 Gram(s) Oral once PRN Blood Glucose LESS THAN 70 milliGRAM(s)/deciliter  glucagon  Injectable 1 milliGRAM(s) IntraMuscular once PRN Glucose LESS THAN 70 milligrams/deciliter  guaiFENesin/dextromethorphan  Syrup 5 milliLiter(s) Oral every 4 hours PRN Cough  hydrOXYzine hydrochloride 50 milliGRAM(s) Oral every 6 hours PRN Anxiety  hydrOXYzine hydrochloride 100 milliGRAM(s) Oral at bedtime PRN insomnia  ibuprofen  Tablet. 400 milliGRAM(s) Oral every 6 hours PRN Mild Pain (1 - 3)  magnesium hydroxide Suspension 30 milliLiter(s) Oral once PRN Constipation  methocarbamol 500 milliGRAM(s) Oral every 6 hours PRN muscle pain  pseudoephedrine 60 milliGRAM(s) Oral every 6 hours PRN Rhinitis  trimethobenzamide 300 milliGRAM(s) Oral every 6 hours PRN Nausea and/or Vomiting  trimethobenzamide Injectable 200 milliGRAM(s) IntraMuscular every 6 hours PRN Nausea and/or Vomiting      T(C): 35.8 (19 @ 06:00), Max: 36.6 (19 @ 11:45)  HR: 73 (19 @ 06:00) (73 - 92)  BP: 111/57 (19 @ 06:00) (106/51 - 113/56)  RR: 16 (19 @ 06:00) (14 - 18)  SpO2: --    PHYSICAL EXAM:      Constitutional: NAD, A&O x3    Eyes: PERRLA, no conjuctivitis    Neck: no lymphadenopathy    Respiratory: +air entry, no rales, no rhonchi, no wheezes    Cardiovascular: +S1 and S2, regular rate and rhythm    Gastrointestinal: +BS, soft, non-tender, not distended    Extremities:  no edema, no calf tenderness    Skin: no rashes, normal turgor                            12.5   9.49  )-----------( 315      ( 15 Jul 2019 15:49 )             38.6   07-15    141  |  106  |  15  ----------------------------<  119<H>  3.9   |  22  |  0.8    Ca    9.0      15 Jul 2019 15:49  Mg     2.0     07-15    TPro  6.9  /  Alb  3.5  /  TBili  0.3  /  DBili  x   /  AST  21  /  ALT  20  /  AlkPhos  64  07-15    Magnesium, Serum: 2.0 mg/dL (07-15-19 @ 15:49)  Ammonia, Serum: 45 umol/L (07-15-19 @ 15:49)  Hemoglobin A1C, Whole Blood: 5.6 % (19 @ 09:06)  Treponema Pallidum Antibody Interpretation: Negative (07-15-19 @ 15:49)  Hepatitis B Surface Antigen: Nonreact (07-15-19 @ 15:49)  Hepatitis C Virus S/CO Ratio: 7.43 S/CO (07-15-19 @ 15:49)    Hepatitis C Virus Interpretation: Reactive (07-15-19 @ 15:49)      Urinalysis Basic - ( 15 Jul 2019 14:14 )    Color: Yellow / Appearance: Clear / S.025 / pH: x  Gluc: x / Ketone: Trace  / Bili: Negative / Urobili: 0.2 mg/dL   Blood: x / Protein: 30 mg/dL / Nitrite: Positive   Leuk Esterase: Small / RBC: x / WBC 1-2 /HPF   Sq Epi: x / Non Sq Epi: Many /HPF / Bacteria: Many    Drug Screen 1, Urine Result: Done (07-15-19 @ 14:14)        Impression and Plan:    Primary Diagnosis:  Opiate Dependency                                Medication: Methadone Protocol    Secondary Diagnosis: HTN                                                   Medication: c/w current meds; monitor b/p    Tertiary Diagnosis: Bipolar D/O                                             Medication: c/w current meds; psych c/s PRN    UTI                     start on macrobid x 7 days      Continue Detox Protocols. Use of PRNS as needed for withdrawal and comfort.    Adjustments to protocols:    Labs/ Tests reviewed.    Tests ordered:     Likely Disposition: ___Home       _X__Rehab       ___Outpatient Program    ___Self Help     _____Other    Estimated Length of stay:__5__

## 2019-07-19 VITALS
SYSTOLIC BLOOD PRESSURE: 112 MMHG | DIASTOLIC BLOOD PRESSURE: 59 MMHG | HEART RATE: 88 BPM | RESPIRATION RATE: 14 BRPM | TEMPERATURE: 98 F

## 2019-07-19 RX ORDER — FAMOTIDINE 10 MG/ML
1 INJECTION INTRAVENOUS
Qty: 0 | Refills: 0 | DISCHARGE
Start: 2019-07-19

## 2019-07-19 RX ADMIN — Medication 100 MILLIGRAM(S): at 09:03

## 2019-07-19 RX ADMIN — METFORMIN HYDROCHLORIDE 1000 MILLIGRAM(S): 850 TABLET ORAL at 09:04

## 2019-07-19 RX ADMIN — AMLODIPINE BESYLATE 10 MILLIGRAM(S): 2.5 TABLET ORAL at 09:05

## 2019-07-19 RX ADMIN — Medication 1 TABLET(S): at 09:04

## 2019-07-19 RX ADMIN — FAMOTIDINE 20 MILLIGRAM(S): 10 INJECTION INTRAVENOUS at 09:04

## 2019-07-19 RX ADMIN — METHADONE HYDROCHLORIDE 5 MILLIGRAM(S): 40 TABLET ORAL at 09:05

## 2019-07-19 RX ADMIN — Medication 100 MILLIGRAM(S): at 09:04

## 2019-07-19 RX ADMIN — BUPROPION HYDROCHLORIDE 150 MILLIGRAM(S): 150 TABLET, EXTENDED RELEASE ORAL at 09:04

## 2019-07-19 NOTE — CHART NOTE - NSCHARTNOTEFT_GEN_A_CORE
Allergies:  BuSpar (Rash)  clonidine (Other (Mild))  Lamictal (Rash)  Nicotine Patch (Rash (Mild))      Diet: Regular    Activity: as tolerated    Follow up with    1. PMD in 2 weeks    2. Psych in 2 weeks    3.    Follow up for abnormal labs/tests    1.    Extra Instructions:      Flu Vaccine given  Yes_____         No______      Diagnosis:  Chemical Dependency   Maintain sobriety  refrain from all use      Patient Signature___________________________________________  Date_________________      Nurse Signature_____________________________________________Date_________________ Allergies:  BuSpar (Rash)  clonidine (Other (Mild))  Lamictal (Rash)  Nicotine Patch (Rash (Mild))      Diet: Regular    Activity: as tolerated    Follow up with    1. PMD in 2 weeks    2. Psych in 2 weeks    3. Patient left AMA    Extra Instructions: Go to aftercare      Flu Vaccine given  Yes_____         No______      Diagnosis:  Chemical Dependency   Maintain sobriety  refrain from all use      Patient Signature___________________________________________  Date_________________      Nurse Signature_____________________________________________Date_________________

## 2019-07-24 ENCOUNTER — OUTPATIENT (OUTPATIENT)
Dept: OUTPATIENT SERVICES | Facility: HOSPITAL | Age: 29
LOS: 1 days | Discharge: HOME | End: 2019-07-24

## 2019-07-24 DIAGNOSIS — I49.9 CARDIAC ARRHYTHMIA, UNSPECIFIED: ICD-10-CM

## 2019-07-24 DIAGNOSIS — Z86.69 PERSONAL HISTORY OF OTHER DISEASES OF THE NERVOUS SYSTEM AND SENSE ORGANS: Chronic | ICD-10-CM

## 2019-07-24 DIAGNOSIS — Z90.89 ACQUIRED ABSENCE OF OTHER ORGANS: Chronic | ICD-10-CM

## 2019-07-24 PROBLEM — E11.9 TYPE 2 DIABETES MELLITUS WITHOUT COMPLICATIONS: Chronic | Status: ACTIVE | Noted: 2019-07-15

## 2019-07-24 PROBLEM — J45.909 UNSPECIFIED ASTHMA, UNCOMPLICATED: Chronic | Status: ACTIVE | Noted: 2019-07-15

## 2019-07-25 DIAGNOSIS — N39.0 URINARY TRACT INFECTION, SITE NOT SPECIFIED: ICD-10-CM

## 2019-07-25 DIAGNOSIS — E05.90 THYROTOXICOSIS, UNSPECIFIED WITHOUT THYROTOXIC CRISIS OR STORM: ICD-10-CM

## 2019-07-25 DIAGNOSIS — F11.20 OPIOID DEPENDENCE, UNCOMPLICATED: ICD-10-CM

## 2019-07-25 DIAGNOSIS — F15.20 OTHER STIMULANT DEPENDENCE, UNCOMPLICATED: ICD-10-CM

## 2019-07-25 DIAGNOSIS — F31.9 BIPOLAR DISORDER, UNSPECIFIED: ICD-10-CM

## 2019-07-25 DIAGNOSIS — E11.9 TYPE 2 DIABETES MELLITUS WITHOUT COMPLICATIONS: ICD-10-CM

## 2019-07-25 DIAGNOSIS — F14.20 COCAINE DEPENDENCE, UNCOMPLICATED: ICD-10-CM

## 2019-07-25 DIAGNOSIS — J45.909 UNSPECIFIED ASTHMA, UNCOMPLICATED: ICD-10-CM

## 2019-07-25 DIAGNOSIS — Z88.8 ALLERGY STATUS TO OTHER DRUGS, MEDICAMENTS AND BIOLOGICAL SUBSTANCES: ICD-10-CM

## 2019-07-25 DIAGNOSIS — I10 ESSENTIAL (PRIMARY) HYPERTENSION: ICD-10-CM

## 2019-07-25 DIAGNOSIS — F17.210 NICOTINE DEPENDENCE, CIGARETTES, UNCOMPLICATED: ICD-10-CM

## 2019-09-30 ENCOUNTER — OUTPATIENT (OUTPATIENT)
Dept: OUTPATIENT SERVICES | Facility: HOSPITAL | Age: 29
LOS: 1 days | Discharge: HOME | End: 2019-09-30

## 2019-09-30 ENCOUNTER — INPATIENT (INPATIENT)
Facility: HOSPITAL | Age: 29
LOS: 0 days | Discharge: LEFT BEFORE DISCHARGE | End: 2019-10-01
Attending: INTERNAL MEDICINE | Admitting: INTERNAL MEDICINE

## 2019-09-30 VITALS
SYSTOLIC BLOOD PRESSURE: 135 MMHG | WEIGHT: 279.99 LBS | HEART RATE: 116 BPM | RESPIRATION RATE: 16 BRPM | TEMPERATURE: 98 F | DIASTOLIC BLOOD PRESSURE: 77 MMHG | HEIGHT: 66 IN

## 2019-09-30 DIAGNOSIS — Z86.69 PERSONAL HISTORY OF OTHER DISEASES OF THE NERVOUS SYSTEM AND SENSE ORGANS: Chronic | ICD-10-CM

## 2019-09-30 DIAGNOSIS — Z90.89 ACQUIRED ABSENCE OF OTHER ORGANS: Chronic | ICD-10-CM

## 2019-09-30 DIAGNOSIS — E78.1 PURE HYPERGLYCERIDEMIA: ICD-10-CM

## 2019-09-30 DIAGNOSIS — F31.9 BIPOLAR DISORDER, UNSPECIFIED: ICD-10-CM

## 2019-09-30 DIAGNOSIS — I10 ESSENTIAL (PRIMARY) HYPERTENSION: ICD-10-CM

## 2019-09-30 DIAGNOSIS — F17.200 NICOTINE DEPENDENCE, UNSPECIFIED, UNCOMPLICATED: ICD-10-CM

## 2019-09-30 DIAGNOSIS — B19.20 UNSPECIFIED VIRAL HEPATITIS C WITHOUT HEPATIC COMA: ICD-10-CM

## 2019-09-30 DIAGNOSIS — J45.909 UNSPECIFIED ASTHMA, UNCOMPLICATED: ICD-10-CM

## 2019-09-30 DIAGNOSIS — E28.2 POLYCYSTIC OVARIAN SYNDROME: ICD-10-CM

## 2019-09-30 DIAGNOSIS — F14.20 COCAINE DEPENDENCE, UNCOMPLICATED: ICD-10-CM

## 2019-09-30 DIAGNOSIS — F11.20 OPIOID DEPENDENCE, UNCOMPLICATED: ICD-10-CM

## 2019-09-30 DIAGNOSIS — E05.90 THYROTOXICOSIS, UNSPECIFIED WITHOUT THYROTOXIC CRISIS OR STORM: ICD-10-CM

## 2019-09-30 LAB
ALBUMIN SERPL ELPH-MCNC: 3.7 G/DL — SIGNIFICANT CHANGE UP (ref 3.5–5.2)
ALP SERPL-CCNC: 67 U/L — SIGNIFICANT CHANGE UP (ref 30–115)
ALT FLD-CCNC: 16 U/L — SIGNIFICANT CHANGE UP (ref 0–41)
AMPHET UR-MCNC: POSITIVE
AMYLASE P1 CFR SERPL: 12 U/L — LOW (ref 25–115)
ANION GAP SERPL CALC-SCNC: 12 MMOL/L — SIGNIFICANT CHANGE UP (ref 7–14)
APPEARANCE UR: CLEAR — SIGNIFICANT CHANGE UP
AST SERPL-CCNC: 12 U/L — SIGNIFICANT CHANGE UP (ref 0–41)
BACTERIA # UR AUTO: SIGNIFICANT CHANGE UP /HPF
BARBITURATES UR SCN-MCNC: NEGATIVE — SIGNIFICANT CHANGE UP
BASOPHILS # BLD AUTO: 0.03 K/UL — SIGNIFICANT CHANGE UP (ref 0–0.2)
BASOPHILS NFR BLD AUTO: 0.3 % — SIGNIFICANT CHANGE UP (ref 0–1)
BENZODIAZ UR-MCNC: NEGATIVE — SIGNIFICANT CHANGE UP
BILIRUB SERPL-MCNC: 0.3 MG/DL — SIGNIFICANT CHANGE UP (ref 0.2–1.2)
BILIRUB UR-MCNC: NEGATIVE — SIGNIFICANT CHANGE UP
BUN SERPL-MCNC: 10 MG/DL — SIGNIFICANT CHANGE UP (ref 10–20)
CALCIUM SERPL-MCNC: 9 MG/DL — SIGNIFICANT CHANGE UP (ref 8.5–10.1)
CHLORIDE SERPL-SCNC: 102 MMOL/L — SIGNIFICANT CHANGE UP (ref 98–110)
CO2 SERPL-SCNC: 24 MMOL/L — SIGNIFICANT CHANGE UP (ref 17–32)
COCAINE METAB.OTHER UR-MCNC: POSITIVE
COLOR SPEC: YELLOW — SIGNIFICANT CHANGE UP
COMMENT - URINE: SIGNIFICANT CHANGE UP
CREAT SERPL-MCNC: 0.8 MG/DL — SIGNIFICANT CHANGE UP (ref 0.7–1.5)
DIFF PNL FLD: ABNORMAL
DRUG SCREEN 1, URINE RESULT: SIGNIFICANT CHANGE UP
EOSINOPHIL # BLD AUTO: 0.21 K/UL — SIGNIFICANT CHANGE UP (ref 0–0.7)
EOSINOPHIL NFR BLD AUTO: 1.9 % — SIGNIFICANT CHANGE UP (ref 0–8)
EPI CELLS # UR: ABNORMAL /HPF
GGT SERPL-CCNC: 17 U/L — SIGNIFICANT CHANGE UP (ref 1–40)
GLUCOSE SERPL-MCNC: 108 MG/DL — HIGH (ref 70–99)
GLUCOSE UR QL: NEGATIVE MG/DL — SIGNIFICANT CHANGE UP
HCG UR QL: NEGATIVE — SIGNIFICANT CHANGE UP
HCT VFR BLD CALC: 40.1 % — SIGNIFICANT CHANGE UP (ref 37–47)
HGB BLD-MCNC: 12.9 G/DL — SIGNIFICANT CHANGE UP (ref 12–16)
IMM GRANULOCYTES NFR BLD AUTO: 0.5 % — HIGH (ref 0.1–0.3)
KETONES UR-MCNC: ABNORMAL
LEUKOCYTE ESTERASE UR-ACNC: ABNORMAL
LYMPHOCYTES # BLD AUTO: 2.82 K/UL — SIGNIFICANT CHANGE UP (ref 1.2–3.4)
LYMPHOCYTES # BLD AUTO: 25.5 % — SIGNIFICANT CHANGE UP (ref 20.5–51.1)
MCHC RBC-ENTMCNC: 26.5 PG — LOW (ref 27–31)
MCHC RBC-ENTMCNC: 32.2 G/DL — SIGNIFICANT CHANGE UP (ref 32–37)
MCV RBC AUTO: 82.3 FL — SIGNIFICANT CHANGE UP (ref 81–99)
METHADONE UR-MCNC: NEGATIVE — SIGNIFICANT CHANGE UP
MONOCYTES # BLD AUTO: 0.55 K/UL — SIGNIFICANT CHANGE UP (ref 0.1–0.6)
MONOCYTES NFR BLD AUTO: 5 % — SIGNIFICANT CHANGE UP (ref 1.7–9.3)
NEUTROPHILS # BLD AUTO: 7.38 K/UL — HIGH (ref 1.4–6.5)
NEUTROPHILS NFR BLD AUTO: 66.8 % — SIGNIFICANT CHANGE UP (ref 42.2–75.2)
NITRITE UR-MCNC: NEGATIVE — SIGNIFICANT CHANGE UP
NRBC # BLD: 0 /100 WBCS — SIGNIFICANT CHANGE UP (ref 0–0)
OPIATES UR-MCNC: POSITIVE
PCP UR-MCNC: NEGATIVE — SIGNIFICANT CHANGE UP
PH UR: 5.5 — SIGNIFICANT CHANGE UP (ref 5–8)
PLATELET # BLD AUTO: 345 K/UL — SIGNIFICANT CHANGE UP (ref 130–400)
POTASSIUM SERPL-MCNC: 3.4 MMOL/L — LOW (ref 3.5–5)
POTASSIUM SERPL-SCNC: 3.4 MMOL/L — LOW (ref 3.5–5)
PROPOXYPHENE QUALITATIVE URINE RESULT: NEGATIVE — SIGNIFICANT CHANGE UP
PROT SERPL-MCNC: 7 G/DL — SIGNIFICANT CHANGE UP (ref 6–8)
PROT UR-MCNC: 100 MG/DL
RBC # BLD: 4.87 M/UL — SIGNIFICANT CHANGE UP (ref 4.2–5.4)
RBC # FLD: 14.6 % — HIGH (ref 11.5–14.5)
RBC CASTS # UR COMP ASSIST: SIGNIFICANT CHANGE UP /HPF
SODIUM SERPL-SCNC: 138 MMOL/L — SIGNIFICANT CHANGE UP (ref 135–146)
SP GR SPEC: >=1.03 (ref 1.01–1.03)
THC UR QL: POSITIVE
UROBILINOGEN FLD QL: 0.2 MG/DL — SIGNIFICANT CHANGE UP (ref 0.2–0.2)
WBC # BLD: 11.05 K/UL — HIGH (ref 4.8–10.8)
WBC # FLD AUTO: 11.05 K/UL — HIGH (ref 4.8–10.8)
WBC UR QL: SIGNIFICANT CHANGE UP /HPF

## 2019-09-30 RX ORDER — METHADONE HYDROCHLORIDE 40 MG/1
10 TABLET ORAL ONCE
Refills: 0 | Status: DISCONTINUED | OUTPATIENT
Start: 2019-09-30 | End: 2019-09-30

## 2019-09-30 RX ORDER — PSEUDOEPHEDRINE HCL 30 MG
60 TABLET ORAL EVERY 6 HOURS
Refills: 0 | Status: DISCONTINUED | OUTPATIENT
Start: 2019-09-30 | End: 2019-10-01

## 2019-09-30 RX ORDER — GABAPENTIN 400 MG/1
600 CAPSULE ORAL AT BEDTIME
Refills: 0 | Status: DISCONTINUED | OUTPATIENT
Start: 2019-09-30 | End: 2019-10-01

## 2019-09-30 RX ORDER — METHADONE HYDROCHLORIDE 40 MG/1
5 TABLET ORAL EVERY 12 HOURS
Refills: 0 | Status: DISCONTINUED | OUTPATIENT
Start: 2019-10-01 | End: 2019-10-01

## 2019-09-30 RX ORDER — ALBUTEROL 90 UG/1
2 AEROSOL, METERED ORAL EVERY 4 HOURS
Refills: 0 | Status: DISCONTINUED | OUTPATIENT
Start: 2019-09-30 | End: 2019-10-01

## 2019-09-30 RX ORDER — METHOCARBAMOL 500 MG/1
500 TABLET, FILM COATED ORAL EVERY 6 HOURS
Refills: 0 | Status: DISCONTINUED | OUTPATIENT
Start: 2019-09-30 | End: 2019-10-01

## 2019-09-30 RX ORDER — ARIPIPRAZOLE 15 MG/1
30 TABLET ORAL DAILY
Refills: 0 | Status: DISCONTINUED | OUTPATIENT
Start: 2019-09-30 | End: 2019-09-30

## 2019-09-30 RX ORDER — MULTIVIT-MIN/FERROUS GLUCONATE 9 MG/15 ML
1 LIQUID (ML) ORAL DAILY
Refills: 0 | Status: DISCONTINUED | OUTPATIENT
Start: 2019-09-30 | End: 2019-10-01

## 2019-09-30 RX ORDER — HYDROXYZINE HCL 10 MG
50 TABLET ORAL EVERY 6 HOURS
Refills: 0 | Status: DISCONTINUED | OUTPATIENT
Start: 2019-09-30 | End: 2019-10-01

## 2019-09-30 RX ORDER — ARIPIPRAZOLE 15 MG/1
30 TABLET ORAL AT BEDTIME
Refills: 0 | Status: DISCONTINUED | OUTPATIENT
Start: 2019-09-30 | End: 2019-10-01

## 2019-09-30 RX ORDER — IBUPROFEN 200 MG
400 TABLET ORAL EVERY 6 HOURS
Refills: 0 | Status: DISCONTINUED | OUTPATIENT
Start: 2019-09-30 | End: 2019-10-01

## 2019-09-30 RX ORDER — BUPROPION HYDROCHLORIDE 150 MG/1
150 TABLET, EXTENDED RELEASE ORAL DAILY
Refills: 0 | Status: DISCONTINUED | OUTPATIENT
Start: 2019-09-30 | End: 2019-10-01

## 2019-09-30 RX ORDER — PROPYLTHIOURACIL 50 MG
50 TABLET ORAL
Refills: 0 | Status: DISCONTINUED | OUTPATIENT
Start: 2019-09-30 | End: 2019-10-01

## 2019-09-30 RX ORDER — MAGNESIUM HYDROXIDE 400 MG/1
30 TABLET, CHEWABLE ORAL ONCE
Refills: 0 | Status: DISCONTINUED | OUTPATIENT
Start: 2019-09-30 | End: 2019-10-01

## 2019-09-30 RX ORDER — METFORMIN HYDROCHLORIDE 850 MG/1
1000 TABLET ORAL
Refills: 0 | Status: DISCONTINUED | OUTPATIENT
Start: 2019-09-30 | End: 2019-10-01

## 2019-09-30 RX ORDER — GUAIFENESIN/DEXTROMETHORPHAN 600MG-30MG
5 TABLET, EXTENDED RELEASE 12 HR ORAL EVERY 4 HOURS
Refills: 0 | Status: DISCONTINUED | OUTPATIENT
Start: 2019-09-30 | End: 2019-10-01

## 2019-09-30 RX ORDER — AMLODIPINE BESYLATE 2.5 MG/1
10 TABLET ORAL DAILY
Refills: 0 | Status: DISCONTINUED | OUTPATIENT
Start: 2019-09-30 | End: 2019-10-01

## 2019-09-30 RX ORDER — BACITRACIN ZINC 500 UNIT/G
1 OINTMENT IN PACKET (EA) TOPICAL
Refills: 0 | Status: DISCONTINUED | OUTPATIENT
Start: 2019-09-30 | End: 2019-10-01

## 2019-09-30 RX ORDER — HYDROXYZINE HCL 10 MG
100 TABLET ORAL AT BEDTIME
Refills: 0 | Status: DISCONTINUED | OUTPATIENT
Start: 2019-09-30 | End: 2019-10-01

## 2019-09-30 RX ORDER — TOPIRAMATE 25 MG
100 TABLET ORAL
Refills: 0 | Status: DISCONTINUED | OUTPATIENT
Start: 2019-09-30 | End: 2019-10-01

## 2019-09-30 RX ORDER — METHADONE HYDROCHLORIDE 40 MG/1
TABLET ORAL
Refills: 0 | Status: DISCONTINUED | OUTPATIENT
Start: 2019-09-30 | End: 2019-10-01

## 2019-09-30 RX ORDER — PROPYLTHIOURACIL 50 MG
100 TABLET ORAL DAILY
Refills: 0 | Status: DISCONTINUED | OUTPATIENT
Start: 2019-09-30 | End: 2019-10-01

## 2019-09-30 RX ORDER — ACETAMINOPHEN 500 MG
650 TABLET ORAL EVERY 4 HOURS
Refills: 0 | Status: DISCONTINUED | OUTPATIENT
Start: 2019-09-30 | End: 2019-10-01

## 2019-09-30 RX ORDER — TOPIRAMATE 25 MG
1 TABLET ORAL
Qty: 0 | Refills: 0 | DISCHARGE

## 2019-09-30 RX ORDER — ALBUTEROL 90 UG/1
2 AEROSOL, METERED ORAL
Qty: 0 | Refills: 0 | DISCHARGE

## 2019-09-30 RX ORDER — METHADONE HYDROCHLORIDE 40 MG/1
10 TABLET ORAL EVERY 12 HOURS
Refills: 0 | Status: DISCONTINUED | OUTPATIENT
Start: 2019-09-30 | End: 2019-10-01

## 2019-09-30 RX ADMIN — ARIPIPRAZOLE 30 MILLIGRAM(S): 15 TABLET ORAL at 21:47

## 2019-09-30 RX ADMIN — Medication 100 MILLIGRAM(S): at 21:47

## 2019-09-30 RX ADMIN — METHADONE HYDROCHLORIDE 10 MILLIGRAM(S): 40 TABLET ORAL at 13:17

## 2019-09-30 RX ADMIN — METFORMIN HYDROCHLORIDE 1000 MILLIGRAM(S): 850 TABLET ORAL at 21:47

## 2019-09-30 RX ADMIN — Medication 1 APPLICATION(S): at 14:06

## 2019-09-30 RX ADMIN — Medication 50 MILLIGRAM(S): at 21:47

## 2019-09-30 RX ADMIN — Medication 100 MILLIGRAM(S): at 16:40

## 2019-09-30 RX ADMIN — METHADONE HYDROCHLORIDE 10 MILLIGRAM(S): 40 TABLET ORAL at 21:47

## 2019-09-30 RX ADMIN — GABAPENTIN 600 MILLIGRAM(S): 400 CAPSULE ORAL at 21:46

## 2019-09-30 RX ADMIN — Medication 30 MILLILITER(S): at 16:41

## 2019-09-30 NOTE — H&P ADULT - PROBLEM SELECTOR PLAN 10
Observation  Atarax 50 mg po Q6H  PRN anxiety  Atarax 100 mg po QHS PRN Insomnia  Psych. Consult Prn  Continue:  Abilify 30 mg po daily  Wellbutrin  mg po daily  Gabapentin 600 mg po q Hs

## 2019-09-30 NOTE — H&P ADULT - NSHPPHYSICALEXAM_GEN_ALL_CORE
-  Vital Signs:      Temp:  97.7    Pulse:  124     RR: 16      BP:   136/94                      Constitutional: anxious A&Ox3, WD/WN,Obese  Eyes: PERRLA  Respiratory: +air entry, no rales, no rhonchi, no wheezes  Cardiovascular: +S1 and S2,RRR  Gastrointestinal: +BS, soft, non-tender, not distended, No CVAT  Extremities: no cyanosis, no edema, no calf tenderness,   Vascular: +dorsal pedis and radial pulses, no extremity cyanosis  Neurological: sensation intact, ROM equal B/L, CN II-XII intact, Gait: steady  Skin: no rashes, normal turgor, No track marks (+) healing Excoriation Upper Chest Area  No Decubiti present  No IV lines present  Rectal/Breasts Exam: Deferred

## 2019-09-30 NOTE — H&P ADULT - PROBLEM SELECTOR PLAN 1
Check Urine Toxicology  Methadone Taper Protocol (Normal)  Monitor VS and withdrawal symptoms  Prn Medication

## 2019-09-30 NOTE — H&P ADULT - HISTORY OF PRESENT ILLNESS
This is a 30 Y/O White Female with Hx of Continuous Opiate & Crack Dependency. Prior Hx of 15 Detox in the past, Last Detox at [facility] 7/15/2019 – 2019 with 2 Weeks Clean ivan after D/C. hx of 7 Rehabs in the past, last Rehab at [facility] in 2019 for 3 Weeks. Pt is persistently using for the past 8 Weeks.    DRUG	AGE OF ONSET	ROUTE	FREQ	AMOUNT	LAST USE	LENGTH OF CURRENT USE	  HEROIN	25 Y/O	IV/IN	Daily	5-6 Bags	2019  4 Bags	8 Weeks	  CRACK	21 Y/O	Smoking	Daily	$ 200	2019    $ 10	8 Weeks	  							  							  							  							    I-Stop: Negative    Hx of  Overdose :        NO                                             Hx x of Withdrawal Seizures:      NO               MMTP :      NO         OBGYN Hx:     LMP                       :      2019                                                        P: 0                  Last Pap-Smear    :         (WNL)      Hx of PCOS                       Las t Mammogram: never had one    PSYCH. Hx :   Anxiety &  Depression, Bipolar D/O, and PTSD                          Compliant with Wellbutrin, Abilfy, and Gabapentin                          Hx of Suicidal attempt Once age 21 Y/O when OD on Tylenol                          Denies any S/H Ideation or A/V Hallucination                                 Screening history	Last tested	Result	History of treatment	  HIV	7/15/2019	NEG	N/A	  Hepatitis C	7/15/2019	NEG	N/A	  Quantiferon GOLD TB test	7/15/2019	NEG	N/A	    Immunization	Not Received	Unknown	Received	Date Received 	  Influenza			X	2019	  Pneumococcus		X			  Tetanus			X		  Others					  					    Patient  denied  any Chest Pain, SOB, Abdominal Pain, HA, Blurry Vision, Bleeding ,or Dysuria This is a 30 Y/O White Female with Hx of Continuous Opiate & Crack Dependency. Prior Hx of 15 Detox in the past, Last Detox at [facility] 7/15/2019 – 2019 with 2 Weeks Clean ivan after D/C. hx of 7 Rehabs in the past, last Rehab at [facility] in 2019 for 3 Weeks. Pt is persistently using for the past 8 Weeks.    DRUG	AGE OF ONSET	ROUTE	FREQ	AMOUNT	LAST USE	LENGTH OF CURRENT USE	  HEROIN	27 Y/O	IV/IN	Daily	5-6 Bags	2019  4 Bags	8 Weeks	  CRACK	19 Y/O	Smoking	Daily	$ 200	2019    $ 10	8 Weeks	  							  							  							  							    Opiate W/D  HX:  (+)Myalgia,(+)N/V/D,(+) Diaphoresis,(+)   Anxiety,(+)Insomnia,(+)Piloerection,(+)Lacrimation         I-Stop: Negative    Hx of  Overdose :        NO                                             Hx x of Withdrawal Seizures:      NO               MMTP :      NO         OBGYN Hx:     LMP                       :      2019                                                        P: 0                  Last Pap-Smear    :         (WNL)      Hx of PCOS                       Las t Mammogram: never had one    PSYCH. Hx :   Anxiety &  Depression, Bipolar D/O, and PTSD                          Compliant with Wellbutrin, Abilfy, and Gabapentin                          Hx of Suicidal attempt Once age 19 Y/O when OD on Tylenol                          Denies any S/H Ideation or A/V Hallucination                                 Screening history	Last tested	Result	History of treatment	  HIV	7/15/2019	NEG	N/A	  Hepatitis C	7/15/2019	NEG	N/A	  Quantiferon GOLD TB test	7/15/2019	NEG	N/A	    Immunization	Not Received	Unknown	Received	Date Received 	  Influenza			X	2019	  Pneumococcus		X			  Tetanus			X		  Others					  					    Patient  denied  any Chest Pain, SOB, Abdominal Pain, HA, Blurry Vision, Bleeding ,or Dysuria

## 2019-09-30 NOTE — H&P ADULT - PROBLEM SELECTOR PLAN 5
Heart Healthy Diet  Monitor BP q6h  Clonidine 0.1mg PO Q6H PRN for BP >140/90  Continue Home Meds:  Norvasc 10 mg po daily

## 2019-09-30 NOTE — H&P ADULT - ASSESSMENT
This is a 30 Y/O White Female with Hx of Continuous Opiate & Crack Dependency. Prior Hx of 15 Detox in the past, Last Detox at [facility] 7/15/2019 – 7/19/2019 with 2 Weeks Clean ivan after D/C. hx of 7 Rehabs in the past, last Rehab at [facility] in 4/2019 for 3 Weeks. Pt is persistently using for the past 8 Weeks.

## 2019-10-01 ENCOUNTER — OUTPATIENT (OUTPATIENT)
Dept: OUTPATIENT SERVICES | Facility: HOSPITAL | Age: 29
LOS: 1 days | Discharge: HOME | End: 2019-10-01

## 2019-10-01 VITALS
DIASTOLIC BLOOD PRESSURE: 57 MMHG | SYSTOLIC BLOOD PRESSURE: 115 MMHG | TEMPERATURE: 97 F | RESPIRATION RATE: 16 BRPM | HEART RATE: 92 BPM

## 2019-10-01 DIAGNOSIS — Z90.89 ACQUIRED ABSENCE OF OTHER ORGANS: Chronic | ICD-10-CM

## 2019-10-01 DIAGNOSIS — F11.20 OPIOID DEPENDENCE, UNCOMPLICATED: ICD-10-CM

## 2019-10-01 DIAGNOSIS — Z86.69 PERSONAL HISTORY OF OTHER DISEASES OF THE NERVOUS SYSTEM AND SENSE ORGANS: Chronic | ICD-10-CM

## 2019-10-01 LAB
GLUCOSE BLDC GLUCOMTR-MCNC: 128 MG/DL — HIGH (ref 70–99)
HAV IGM SER-ACNC: SIGNIFICANT CHANGE UP
HBV CORE IGM SER-ACNC: SIGNIFICANT CHANGE UP
HBV SURFACE AG SER-ACNC: SIGNIFICANT CHANGE UP
HCV AB S/CO SERPL IA: 6.35 S/CO — HIGH (ref 0–0.99)
HCV AB SERPL-IMP: REACTIVE
HIV 1+2 AB+HIV1 P24 AG SERPL QL IA: SIGNIFICANT CHANGE UP
T PALLIDUM AB TITR SER: NEGATIVE — SIGNIFICANT CHANGE UP

## 2019-10-01 RX ORDER — POTASSIUM CHLORIDE 20 MEQ
20 PACKET (EA) ORAL
Refills: 0 | Status: COMPLETED | OUTPATIENT
Start: 2019-10-01 | End: 2019-10-01

## 2019-10-01 RX ADMIN — METHADONE HYDROCHLORIDE 5 MILLIGRAM(S): 40 TABLET ORAL at 20:46

## 2019-10-01 RX ADMIN — GABAPENTIN 600 MILLIGRAM(S): 400 CAPSULE ORAL at 20:47

## 2019-10-01 RX ADMIN — AMLODIPINE BESYLATE 10 MILLIGRAM(S): 2.5 TABLET ORAL at 09:29

## 2019-10-01 RX ADMIN — Medication 50 MILLIGRAM(S): at 20:46

## 2019-10-01 RX ADMIN — Medication 100 MILLIGRAM(S): at 15:19

## 2019-10-01 RX ADMIN — Medication 30 MILLILITER(S): at 11:58

## 2019-10-01 RX ADMIN — METHADONE HYDROCHLORIDE 10 MILLIGRAM(S): 40 TABLET ORAL at 09:30

## 2019-10-01 RX ADMIN — Medication 1 APPLICATION(S): at 09:29

## 2019-10-01 RX ADMIN — METFORMIN HYDROCHLORIDE 1000 MILLIGRAM(S): 850 TABLET ORAL at 09:32

## 2019-10-01 RX ADMIN — METFORMIN HYDROCHLORIDE 1000 MILLIGRAM(S): 850 TABLET ORAL at 20:47

## 2019-10-01 RX ADMIN — Medication 100 MILLIGRAM(S): at 20:46

## 2019-10-01 RX ADMIN — ARIPIPRAZOLE 30 MILLIGRAM(S): 15 TABLET ORAL at 20:47

## 2019-10-01 RX ADMIN — BUPROPION HYDROCHLORIDE 150 MILLIGRAM(S): 150 TABLET, EXTENDED RELEASE ORAL at 09:29

## 2019-10-01 RX ADMIN — Medication 20 MILLIEQUIVALENT(S): at 13:19

## 2019-10-01 RX ADMIN — Medication 20 MILLIEQUIVALENT(S): at 10:52

## 2019-10-01 RX ADMIN — Medication 20 MILLIEQUIVALENT(S): at 15:19

## 2019-10-01 RX ADMIN — Medication 100 MILLIGRAM(S): at 09:31

## 2019-10-01 RX ADMIN — Medication 1 TABLET(S): at 09:32

## 2019-10-01 RX ADMIN — Medication 1 APPLICATION(S): at 20:46

## 2019-10-01 NOTE — CHART NOTE - NSCHARTNOTEFT_GEN_A_CORE
PT LEFT AMA    Allergies:  BuSpar (Rash)  clonidine (Other (Mild))  Lamictal (Rash)  Nicotine Patch (Rash (Mild))      Diet: Regular    Activity: as tolerated    Follow up with    1. PMD in 2 weeks    2. Psych in 2 weeks    Follow up for abnormal labs/tests    Extra Instructions:      Flu Vaccine given  Yes_____         No______      Diagnosis:  Chemical Dependency   Maintain sobriety  refrain from all use      Patient Signature___________________________________________  Date_________________      Nurse Signature_____________________________________________Date_________________

## 2019-10-02 DIAGNOSIS — F11.20 OPIOID DEPENDENCE, UNCOMPLICATED: ICD-10-CM

## 2019-10-02 LAB
HCV RNA FLD QL NAA+PROBE: SIGNIFICANT CHANGE UP
HCV RNA SPEC QL PROBE+SIG AMP: SIGNIFICANT CHANGE UP

## 2019-10-02 NOTE — CHART NOTE - NSCHARTNOTEFT_GEN_A_CORE
The patient was admitted to the inpt detox unit CDU, for   ETOH__x__ Opioid_x__  Benzo__x__Polysubstance _____ Dependency.    Pt was admitted from ED____, Intake__x__, Med/surg Floor_______.    Details are present in the preceding History & Physical section and follow up chart notes.  patient was evaluated on daily detox team  rounds.  Withdrawal symptoms and signs were reviewed on a daily basis, and the protocols were adjusted accordingly.    Labs and imaging results were reviewed and discussed with the patient.    All questions from the patient were addressed.  The patient was seen by the Chemical dependency counselors, and different options for after care were discussed.  The patient attended groups, meetings and 1:1 sessions with the counselors.  Narcane Kit was offered and instructions given prior to discharge.    Psychiatry consultation reviewed______, N/A__x____    Physical therapy evaluation reviewed_____, N/A_x___    Pt was given copies of labs and imaging reports, if applicable.    Prescriptions if needed, were sent through [facility] system to the pharmacy amnd are noted in the discharge instruction sheet.    After care was arranged by counselors and pt was discharged to:    Home___, Outpt. Program___, Rehab ___, Long term____ [facility] ____ [facility]____ SNF____, AMA_x__, Admin Discharge____    Principal Diagnosis: Alcohol Dependency___x_ Opioid Dependency__x_ Benzo Dependency__x__ Polysubstance Dependency____PCOS__x__

## 2019-10-07 DIAGNOSIS — Z91.5 PERSONAL HISTORY OF SELF-HARM: ICD-10-CM

## 2019-10-07 DIAGNOSIS — F11.20 OPIOID DEPENDENCE, UNCOMPLICATED: ICD-10-CM

## 2019-10-07 DIAGNOSIS — F31.9 BIPOLAR DISORDER, UNSPECIFIED: ICD-10-CM

## 2019-10-07 DIAGNOSIS — B19.20 UNSPECIFIED VIRAL HEPATITIS C WITHOUT HEPATIC COMA: ICD-10-CM

## 2019-10-07 DIAGNOSIS — F17.210 NICOTINE DEPENDENCE, CIGARETTES, UNCOMPLICATED: ICD-10-CM

## 2019-10-07 DIAGNOSIS — E11.9 TYPE 2 DIABETES MELLITUS WITHOUT COMPLICATIONS: ICD-10-CM

## 2019-10-07 DIAGNOSIS — F41.9 ANXIETY DISORDER, UNSPECIFIED: ICD-10-CM

## 2019-10-07 DIAGNOSIS — I10 ESSENTIAL (PRIMARY) HYPERTENSION: ICD-10-CM

## 2019-10-07 DIAGNOSIS — E66.9 OBESITY, UNSPECIFIED: ICD-10-CM

## 2019-10-07 DIAGNOSIS — E05.90 THYROTOXICOSIS, UNSPECIFIED WITHOUT THYROTOXIC CRISIS OR STORM: ICD-10-CM

## 2019-10-07 DIAGNOSIS — F43.10 POST-TRAUMATIC STRESS DISORDER, UNSPECIFIED: ICD-10-CM

## 2019-10-07 DIAGNOSIS — F14.20 COCAINE DEPENDENCE, UNCOMPLICATED: ICD-10-CM

## 2019-10-07 DIAGNOSIS — Z98.890 OTHER SPECIFIED POSTPROCEDURAL STATES: ICD-10-CM

## 2019-10-07 DIAGNOSIS — Z88.8 ALLERGY STATUS TO OTHER DRUGS, MEDICAMENTS AND BIOLOGICAL SUBSTANCES STATUS: ICD-10-CM

## 2019-10-07 DIAGNOSIS — E28.2 POLYCYSTIC OVARIAN SYNDROME: ICD-10-CM

## 2019-10-07 DIAGNOSIS — J45.909 UNSPECIFIED ASTHMA, UNCOMPLICATED: ICD-10-CM

## 2019-10-07 DIAGNOSIS — G47.00 INSOMNIA, UNSPECIFIED: ICD-10-CM

## 2019-10-14 NOTE — H&P ADULT - NSHPPOACENTRALVENOUSCATHETER_GEN_ALL_CORE
Left message for patient to return call to schedule EGD/colonoscopy. If Marialuisa or Yolanda are not available, please transfer to same day surgery           no

## 2019-10-30 NOTE — ED ADULT TRIAGE NOTE - CHIEF COMPLAINT QUOTE
abscess on left upper arm.  here 3 days ago for same complaint abscess on left upper arm, patient admits to "shooting up" in area prior to abscess developing.  Here 3 days ago for same complaint, was placed on Bactrim. Alert and oriented to person, place and time

## 2019-11-23 ENCOUNTER — EMERGENCY (EMERGENCY)
Facility: HOSPITAL | Age: 29
LOS: 0 days | Discharge: HOME | End: 2019-11-23
Attending: EMERGENCY MEDICINE | Admitting: EMERGENCY MEDICINE
Payer: MEDICARE

## 2019-11-23 VITALS — HEART RATE: 99 BPM

## 2019-11-23 VITALS
TEMPERATURE: 99 F | OXYGEN SATURATION: 98 % | SYSTOLIC BLOOD PRESSURE: 133 MMHG | RESPIRATION RATE: 19 BRPM | HEART RATE: 104 BPM | DIASTOLIC BLOOD PRESSURE: 84 MMHG

## 2019-11-23 DIAGNOSIS — E11.9 TYPE 2 DIABETES MELLITUS WITHOUT COMPLICATIONS: ICD-10-CM

## 2019-11-23 DIAGNOSIS — I10 ESSENTIAL (PRIMARY) HYPERTENSION: ICD-10-CM

## 2019-11-23 DIAGNOSIS — F17.210 NICOTINE DEPENDENCE, CIGARETTES, UNCOMPLICATED: ICD-10-CM

## 2019-11-23 DIAGNOSIS — Z90.89 ACQUIRED ABSENCE OF OTHER ORGANS: Chronic | ICD-10-CM

## 2019-11-23 DIAGNOSIS — I82.4Z2 ACUTE EMBOLISM AND THROMBOSIS OF UNSPECIFIED DEEP VEINS OF LEFT DISTAL LOWER EXTREMITY: ICD-10-CM

## 2019-11-23 DIAGNOSIS — M79.89 OTHER SPECIFIED SOFT TISSUE DISORDERS: ICD-10-CM

## 2019-11-23 DIAGNOSIS — Z88.8 ALLERGY STATUS TO OTHER DRUGS, MEDICAMENTS AND BIOLOGICAL SUBSTANCES STATUS: ICD-10-CM

## 2019-11-23 DIAGNOSIS — Z98.890 OTHER SPECIFIED POSTPROCEDURAL STATES: ICD-10-CM

## 2019-11-23 DIAGNOSIS — Z86.69 PERSONAL HISTORY OF OTHER DISEASES OF THE NERVOUS SYSTEM AND SENSE ORGANS: Chronic | ICD-10-CM

## 2019-11-23 DIAGNOSIS — R50.9 FEVER, UNSPECIFIED: ICD-10-CM

## 2019-11-23 PROCEDURE — 93970 EXTREMITY STUDY: CPT | Mod: 26

## 2019-11-23 PROCEDURE — 99284 EMERGENCY DEPT VISIT MOD MDM: CPT | Mod: GC

## 2019-11-23 RX ORDER — APIXABAN 2.5 MG/1
1 TABLET, FILM COATED ORAL
Qty: 60 | Refills: 0
Start: 2019-11-23 | End: 2019-12-22

## 2019-11-23 RX ORDER — KETOROLAC TROMETHAMINE 30 MG/ML
30 SYRINGE (ML) INJECTION ONCE
Refills: 0 | Status: DISCONTINUED | OUTPATIENT
Start: 2019-11-23 | End: 2019-11-23

## 2019-11-23 RX ADMIN — Medication 30 MILLIGRAM(S): at 12:03

## 2019-11-23 NOTE — ED PROVIDER NOTE - CARE PROVIDER_API CALL
Chucky Rubi)  Internal Medicine  [address] [place] [address]  Phone: [phone]  Fax: [phone]  Follow Up Time: Urgent

## 2019-11-23 NOTE — ED PROVIDER NOTE - PATIENT PORTAL LINK FT
You can access the FollowMyHealth Patient Portal offered by [facility] by registering at the following website: [email or web address]. By joining [facility]’s FollowMyHealth portal, you will also be able to view your health information using other applications (apps) compatible with our system.

## 2019-11-23 NOTE — ED ADULT NURSE NOTE - OBJECTIVE STATEMENT
Pt presented with c/o L calf pain x2 weeks after injecting cocaine tot he site. Area noted to be swollen and red. As per pt, took OTC at home with no relief. Hx DVT. Alert and oriented x3. No obvious deformities or trauma noted.

## 2019-11-23 NOTE — ED PROVIDER NOTE - CLINICAL SUMMARY MEDICAL DECISION MAKING FREE TEXT BOX
28 yo female with  calf pain found to have DVT, has prior h/.o DVT used to take NOAC, but no longer.  Nml work of breathing, denies CP or SOB, anticoagulants prescribed, d/c home in a stable condition with strict instructions for follow up with vascular surgery.

## 2019-11-23 NOTE — ED PROVIDER NOTE - ATTENDING CONTRIBUTION TO CARE
28 yo female PMH as stated c/o painful swelling to the back of her left calf for past 2 weeks.  Injected cocaine in the vicinity shortly after symptoms started.  No fever or chills, no SOB or other acute complaints. Pain in non-radiating, worse with ambulation and palpation.  Patient admits she is " an addict" was clean for 6 months , but started using again.  Well-appearing, well-nourished female, sitting on recliner, NAD, nml work of breathing, lungs CTA b/l, RRR, distal pulses intact, + well-circumscribed are of swelling and surrounding erythema on the back of the left calf, no palpable fluctuance or crepitus, no lymphangitic streaking .  Imp: focal cellulitis vs early abscess vs thrombophlebitis.  If no clot or focal fluid collection , will treat with oral abx and warm compresses.

## 2019-11-23 NOTE — ED PROVIDER NOTE - PHYSICAL EXAMINATION
Vital Signs: I have reviewed the initial vital signs.  Constitutional: well-nourished, appears stated age, no acute distress  Cardiovascular: regular rate, regular rhythm, well-perfused extremities  Respiratory: unlabored respiratory effort, clear to auscultation bilaterally  Gastrointestinal: soft, non-tender abdomen, no pulsatile mass  Musculoskeletal: supple neck, no lower extremity edema  Integumentary: lower legs thrombophlebitis BL. Left lower leg swollen and warm to touch  Neurologic: awake, alert, cranial nerves II-XII grossly intact, extremities’ motor and sensory functions grossly intact  Psychiatric: appropriate mood, appropriate affect

## 2019-11-23 NOTE — ED PROVIDER NOTE - NS ED ROS FT
Constitutional: (+) low grade fever (-) vomiting  Eyes/ENT: (-) vision changes, (-) hearing changes  Cardiovascular: (-) chest pain, (-) sob  Respiratory: (-) cough, (-) shortness of breath  Gastrointestinal: (-) vomiting, (-) diarrhea, (-) abdominal pain  : (-) dysuria   Musculoskeletal: (-) back pain, (+) left lower leg pain and swelling  Integumentary: (-) rash, (-) edema, (+) superficial thrombophlebitis on lower legs BL  Neurological: (-) loc  Allergic/Immunologic: (-) pruritus  Endocrine: No history of thyroid disease or diabetes.

## 2019-11-23 NOTE — ED PROVIDER NOTE - PMH
Adult ADHD    Asthma    Cocaine abuse    Depression    Hepatitis C    HTN (hypertension)    Hyperthyroidism    IV drug abuse    Obesity    Occasional tremors    Opioid abuse    PCOS (polycystic ovarian syndrome)    Pulmonary embolism, unspecified chronicity, unspecified pulmonary embolism type, unspecified whether acute cor pulmonale present    T2DM (type 2 diabetes mellitus)

## 2019-11-23 NOTE — ED PROVIDER NOTE - OBJECTIVE STATEMENT
30 yo F with PMH of IVDA, PE, Thrombophilia NOS, Asthma, HCV, PCOS, and Hyperthyroidism p/w 2 weeks of throbbing, progressive, painful L lower leg swelling. 1 week ago, she injected cocaine into L lower leg. 800+ mg of advil daily did not provide relief. c/o "chills" but did not measure temperature; denies any CP or SOB or OCP use.

## 2019-11-23 NOTE — ED ADULT TRIAGE NOTE - CHIEF COMPLAINT QUOTE
pt c.o left lower leg pain x 2 weeks. pt noted with redness and swelling to the site. pt admitted to have injected cocaine into the leg. pt has had blood clot in the leg before

## 2019-11-23 NOTE — ED PROVIDER NOTE - PROGRESS NOTE DETAILS
+ DVT of the Left gastroc/saphenous 1 cm from the popliteal, + DVT of the right greater saphenous prescription for blood thinner sent to her pharmacy, instructed to follow up with vascular surgery, strict return precautions given.  patient verbalized understanding and is amenable with the plan.

## 2020-02-06 ENCOUNTER — HOSPITAL ENCOUNTER (INPATIENT)
Dept: HOSPITAL 74 - YASAS | Age: 30
LOS: 2 days | Discharge: HOME | DRG: 895 | End: 2020-02-08
Attending: ALLERGY & IMMUNOLOGY | Admitting: ALLERGY & IMMUNOLOGY
Payer: COMMERCIAL

## 2020-02-06 VITALS — BODY MASS INDEX: 39.5 KG/M2

## 2020-02-06 DIAGNOSIS — F43.10: ICD-10-CM

## 2020-02-06 DIAGNOSIS — I10: ICD-10-CM

## 2020-02-06 DIAGNOSIS — E28.2: ICD-10-CM

## 2020-02-06 DIAGNOSIS — F17.210: ICD-10-CM

## 2020-02-06 DIAGNOSIS — F11.20: Primary | ICD-10-CM

## 2020-02-06 DIAGNOSIS — Z87.42: ICD-10-CM

## 2020-02-06 DIAGNOSIS — F19.24: ICD-10-CM

## 2020-02-06 DIAGNOSIS — Z91.5: ICD-10-CM

## 2020-02-06 DIAGNOSIS — F15.10: ICD-10-CM

## 2020-02-06 DIAGNOSIS — E05.90: ICD-10-CM

## 2020-02-06 DIAGNOSIS — F19.282: ICD-10-CM

## 2020-02-06 DIAGNOSIS — Z88.8: ICD-10-CM

## 2020-02-06 DIAGNOSIS — Z86.718: ICD-10-CM

## 2020-02-06 DIAGNOSIS — E66.9: ICD-10-CM

## 2020-02-06 DIAGNOSIS — F14.10: ICD-10-CM

## 2020-02-06 DIAGNOSIS — Z62.810: ICD-10-CM

## 2020-02-06 DIAGNOSIS — J45.20: ICD-10-CM

## 2020-02-06 LAB
ALBUMIN SERPL-MCNC: 3 G/DL (ref 3.4–5)
ALP SERPL-CCNC: 65 U/L (ref 45–117)
ALT SERPL-CCNC: 27 U/L (ref 13–61)
ANION GAP SERPL CALC-SCNC: 7 MMOL/L (ref 8–16)
AST SERPL-CCNC: 27 U/L (ref 15–37)
BILIRUB SERPL-MCNC: 0.6 MG/DL (ref 0.2–1)
BUN SERPL-MCNC: 10.4 MG/DL (ref 7–18)
CALCIUM SERPL-MCNC: 8.6 MG/DL (ref 8.5–10.1)
CHLORIDE SERPL-SCNC: 112 MMOL/L (ref 98–107)
CO2 SERPL-SCNC: 19 MMOL/L (ref 21–32)
CREAT SERPL-MCNC: 0.9 MG/DL (ref 0.55–1.3)
DEPRECATED RDW RBC AUTO: 14.9 % (ref 11.6–15.6)
GLUCOSE SERPL-MCNC: 120 MG/DL (ref 74–106)
HCT VFR BLD CALC: 40.3 % (ref 32.4–45.2)
HGB BLD-MCNC: 13.5 GM/DL (ref 10.7–15.3)
MCH RBC QN AUTO: 27.8 PG (ref 25.7–33.7)
MCHC RBC AUTO-ENTMCNC: 33.5 G/DL (ref 32–36)
MCV RBC: 83 FL (ref 80–96)
PLATELET # BLD AUTO: 258 K/MM3 (ref 134–434)
PMV BLD: 9.4 FL (ref 7.5–11.1)
POTASSIUM SERPLBLD-SCNC: 4.3 MMOL/L (ref 3.5–5.1)
PROT SERPL-MCNC: 7.6 G/DL (ref 6.4–8.2)
RBC # BLD AUTO: 4.86 M/MM3 (ref 3.6–5.2)
SODIUM SERPL-SCNC: 138 MMOL/L (ref 136–145)
WBC # BLD AUTO: 7.3 K/MM3 (ref 4–10)

## 2020-02-06 PROCEDURE — HZ42ZZZ GROUP COUNSELING FOR SUBSTANCE ABUSE TREATMENT, COGNITIVE-BEHAVIORAL: ICD-10-PCS | Performed by: ALLERGY & IMMUNOLOGY

## 2020-02-06 RX ADMIN — METFORMIN HYDROCHLORIDE SCH MG: 500 TABLET ORAL at 17:03

## 2020-02-06 RX ADMIN — APIXABAN SCH MG: 5 TABLET, FILM COATED ORAL at 21:51

## 2020-02-06 RX ADMIN — Medication SCH MG: at 21:51

## 2020-02-06 NOTE — HP
COWS





- Scale


Resting Pulse: 1= TN 


Sweatin= Chills/Flushing


Restless Observation: 1= Difficult to Sit Still


Pupil Size: 0= Normal to Room Light


Bone or Joint Aches: 2= Severe Diffuse Aches


Runny Nose/ Eye Tearin= Runny Nose/Eyes


GI Upset > 30mins: 0= None


Tremor Observation: 0= None


Yawning Observation: 0= None


Anxiety or Irritability: 1=Feels Anxious/Irritable


Goose Flesh Skin: 0=Smooth Skin


COWS Score: 8





CIWA Score





- Admission Criteria


[facility] Guidelines: Admission for Medically Managed Detox: 


Requires at least one of the followin. CIWA greater than 12


2. Seizures within the past 24 hours


3. Delirium tremens within the past 24 hours


4. Hallucinations within the past 24 hours


5. Acute intervention needed for co  occurring medical disorder


6. Acute intervention needed for co  occurring psychiatric disorder


7. Severe withdrawal that cannot be handled at a lower level of care (continued


    vomiting, continued diarrhea, abnormal vital signs) requiring intravenous


    medication and/or fluids


8. Pregnancy








Admitting History and Physical





- Admission


Chief Complaint: Ms. William is a 30 yo woman who presents requesting admission 

to rehab for polysubstance use disorder. She statest that she wants to "get my 

life together".


History of Present Illness: 





Ms. William is a 30 yo woman who presents requesting admission to rehab for 

polysubstance use disorder. She statest that she wants to "get my life together

".   She has been to detox multiple times, last detox one year ago. 





PMH: polycystic ovarian disease, asthma, DVT left leg x 6 weeks now on Eliquis





Substance use history


Heroin: first: age 21, last use 2 days ago, quantity: one severiano 1-2 x per week, 

sniff and inject


Cocaine: first use age 21 y, last use 2 days ago, quantity $500/ day


Crystal meth: first use age 26 y, last use , quantity $50 ~1-3 x per month


Cocaine: first use age 21 y, last use 3 day ago, quantity: $50 1-3 x per jimy





Denies Alcohol





- Past Medical History


...LMP: 20


...Pregnant: No





- Smoking History


Smoking history: Current every day smoker


Have you smoked in the past 12 months: Yes


Aproximately how many cigarettes per day: 10





Admission ROS [facility]





- HPI


Allergies/Adverse Reactions: 


 Allergies











Allergy/AdvReac Type Severity Reaction Status Date / Time


 


buspirone [From BuSpar] Allergy Severe Rash Verified 20 10:26


 


clonidine Allergy Severe Rash Verified 20 10:26


 


lamotrigine [From Lamictal] Allergy Severe Rash Verified 20 10:26











Exam Limitations: No Limitations





- Ebola screening


Have you traveled outside of the country in the last 21 days: No


Have you had contact with anyone from an Ebola affected area: No


Have you been sick,other than usual withdrawal symptoms: No


Do you have a fever: No





- Review of Systems


Constitutional: No Symptoms Reported


EENT: reports: No Symptoms Reported


Respiratory: reports: No Symptoms reported


Cardiac: reports: No Symptoms Reported


GI: reports: No Symptoms Reported


: reports: No Symptoms Reported


Musculoskeletal: reports: No Symptoms Reported, Back Pain


Integumentary: reports: No Symptoms Reported


Neuro: reports: Headache (suboccipital, frontal , 5/10, duration: few minutes.  

She associates with using crack)


Endocrine: reports: Other (weight loss 360 lb, now 240, increased exercise)


Hematology: reports: Blood Clots (left leg DVT)


Psychiatric: reports: Depressed





Patient History





- Patient Medical History


Hx Asthma: Yes


Hx Chronic Obstructive Pulmonary Disease (COPD): No


Hx Cardiac Disorders: No


Hx Hypertension: No


Hx Seizures: No


Hx Diabetes: No


Hx Gastrointestinal Disorders: No


Hx Genitourinary Disorders: No


Hx Sexually Transmitted Disorders: Yes (Tx for PID.)


Hx Renal Disease (ESRD): No


Hx Depression: Yes


Hx Suicide Attempt: Yes (Tried to overdose at age 20 yrs old.)


Hx Schizophrenia: No





- Patient Surgical History


Past Surgical History: Yes


Other Surgical History: Tonsillectomy


Anesthesia Reaction: No





- PPD History


Previous Implant?: Yes


Documented Results: Negative w/o proof


Implanted On Prior R Admission?: No


PPD to be Administered?: Yes





- Reproductive History


Last Menstrual Period: 20


Patient Pregnant: No





- Smoking Cessation


Smoking history: Current every day smoker


Have you smoked in the past 12 months: Yes


Aproximately how many cigarettes per day: 10


Hx Chewing Tobacco Use: No


Initiated information on smoking cessation: Yes


'Breaking Loose' booklet given: 20





- Substances abused


  ** Heroin


Substance route: Inhalation


Frequency: 1-2 times per week


Amount used: 1 bag


Age of first use: 21


Date of last use: 20





  ** Crack


Substance route: Smoking


Frequency: Daily


Amount used: $500


Age of first use: 21


Date of last use: 20





  ** Crystal meth


Substance route: Injection


Frequency: 1-3 times last 30 days


Amount used: $50


Age of first use: 26


Date of last use: 20





  ** Cocaine


Substance route: Injection


Frequency: 1-3 times last 30 days


Amount used: $50


Age of first use: 21


Date of last use: 20





Admission Physical Exam [facility]





- Vital Signs


Vital Signs: 


 Vital Signs - 24 hr











  20





  10:38


 


Temperature 97.0 F L


 


Pulse Rate 98 H


 


Respiratory 18





Rate 


 


Blood Pressure 126/75














- Physical


General Appearance: Yes: Mild Distress, Obese


HEENTM: Yes: Within Normal Limits


Respiratory: Yes: Within Normal Limits


Neck: Yes: Within Normal Limits


Breast: Yes: Breast Exam Deferred


Cardiology: Yes: Tachycardia


Abdominal: Yes: Non Tender, Soft, Decreased BS


Back: Yes: Normal Inspection


Musculoskeletal: Yes: Within Normal Limits


Extremities: Yes: Within Normal Limits


Neurological: Yes: Fully Oriented, Alert, Normal Response


Integumentary: Yes: Within Normal Limits





- Diagnostic


(1) Opioid dependence, uncomplicated


Current Visit: Yes   Status: Acute   





(2) Cocaine abuse


Current Visit: Yes   Status: Acute   





(3) Methamphetamine abuse


Current Visit: Yes   Status: Acute   





(4) Nicotine dependence


Current Visit: Yes   Status: Acute   





(5) Polycystic ovarian disease


Current Visit: No   Status: Chronic   





(6) DVT of popliteal vein


Current Visit: Yes   Status: Acute   





(7) Depression


Current Visit: Yes   Status: Chronic   





(8) Bipolar 1 disorder, depressed


Current Visit: Yes   Status: Chronic   





(9) Hyperthyroidism


Current Visit: Yes   Status: Chronic   





Cleared for Admission S





- Detox or Rehab


[facility] Level of Care: Medically Managed





Breathalyzer





- Breathalyzer


Breathalyzer: 0





Urine Drug Screen





- Test Device


Lot number: [id number]


Expiration date: 21





- Control


Is test valid?: Yes





- Results


Drug screen NEGATIVE: No


Urine drug screen results: SANDEEP-Cocaine





Inpatient Rehab Admission





- Rehab Decision to Admit


Inpatient rehab admission?: No

## 2020-02-07 LAB
APPEARANCE UR: (no result)
BILIRUB UR STRIP.AUTO-MCNC: NEGATIVE MG/DL
COLOR UR: YELLOW
KETONES UR QL STRIP: NEGATIVE
LEUKOCYTE ESTERASE UR QL STRIP.AUTO: NEGATIVE
NITRITE UR QL STRIP: NEGATIVE
PH UR: 7 [PH] (ref 5–8)
PROT UR QL STRIP: NEGATIVE
PROT UR QL STRIP: NEGATIVE
SP GR UR: 1.02 (ref 1.01–1.03)
UROBILINOGEN UR STRIP-MCNC: 0.2 MG/DL (ref 0.2–1)

## 2020-02-07 RX ADMIN — APIXABAN SCH MG: 5 TABLET, FILM COATED ORAL at 21:07

## 2020-02-07 RX ADMIN — AMLODIPINE BESYLATE SCH MG: 10 TABLET ORAL at 09:08

## 2020-02-07 RX ADMIN — Medication SCH TAB: at 09:08

## 2020-02-07 RX ADMIN — PROPYLTHIOURACIL SCH MG: 50 TABLET ORAL at 21:06

## 2020-02-07 RX ADMIN — TOPIRAMATE SCH MG: 100 TABLET, FILM COATED ORAL at 21:06

## 2020-02-07 RX ADMIN — METFORMIN HYDROCHLORIDE SCH MG: 500 TABLET ORAL at 17:00

## 2020-02-07 RX ADMIN — METFORMIN HYDROCHLORIDE SCH MG: 500 TABLET ORAL at 06:54

## 2020-02-07 RX ADMIN — PROPYLTHIOURACIL SCH MG: 50 TABLET ORAL at 15:18

## 2020-02-07 RX ADMIN — Medication SCH MG: at 21:06

## 2020-02-07 RX ADMIN — TOPIRAMATE SCH MG: 100 TABLET, FILM COATED ORAL at 15:18

## 2020-02-07 RX ADMIN — HYDROXYZINE PAMOATE PRN MG: 50 CAPSULE ORAL at 09:52

## 2020-02-07 RX ADMIN — PANTOPRAZOLE SODIUM SCH MG: 40 TABLET, DELAYED RELEASE ORAL at 15:49

## 2020-02-07 RX ADMIN — APIXABAN SCH MG: 5 TABLET, FILM COATED ORAL at 09:08

## 2020-02-07 NOTE — PN
[facility] Progress Note


Note: 





Pt is a 30 y/o female with a hx of TAYLOR-heroin,crack/cocaine,crystal meth 

admitted to rehab through [facility] yesterday, 2/6/20. PMHx:Asthma, Obesity, 

Hyperthyroidism, Polycystic Ovarian dz, DVT Left popliteal vein-on Eliquis. 

Psych hx;Bipolar d/o. Pt reports she has a primary care provider, Dr. Aceves on 

[place], [place]. 


This morning, pt c/o anxiety,lower abdominal pain(states she is expecting her 

monthly period due soon but was not specific on date when asked), nasal 

congestion,sweats/chills. Denies nausea or vomiting but reports constipation 

and reports decreased appetite-"I ate only the pancake".


 


 Vital Signs - 24 hr











  02/06/20 02/07/20 02/07/20





  15:03 00:30 03:30


 


Temperature 98.3 F  


 


Pulse Rate 97 H  


 


Respiratory 18 17 17





Rate   


 


Blood Pressure 137/84  














  02/07/20 02/07/20





  06:50 10:00


 


Temperature 98.1 F 


 


Pulse Rate 92 H 99 H


 


Respiratory 18 





Rate  


 


Blood Pressure 118/75 126/80








Alert o x 3,denies s/h/i


nad


oob ambulating with steady gait


cardiac;s1 s2,rr


lungs;cta,erica


abdomen:+bs,soft,slight tenderness to right lower quadrant, not radiating,+++

fatty.


extremities/skin:ne edema;skin intact, varicose veins left LE.





A/P


TAYLOR


new rehab pt








Maintain safety


increase po fluids as tolerated


Vistaril 50 mg po Q6h prn for anxiety


Robaxin 500 mg po TID prn for muscle spasms/ache.


psych consult f/u ordered.


Instructed pt to report to nurse at anytime  about any further discomfort.
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
## 2020-02-08 VITALS — SYSTOLIC BLOOD PRESSURE: 133 MMHG | TEMPERATURE: 98.2 F | HEART RATE: 112 BPM | DIASTOLIC BLOOD PRESSURE: 77 MMHG

## 2020-02-08 RX ADMIN — Medication SCH TAB: at 10:26

## 2020-02-08 RX ADMIN — TOPIRAMATE SCH MG: 100 TABLET, FILM COATED ORAL at 13:42

## 2020-02-08 RX ADMIN — TOPIRAMATE SCH MG: 100 TABLET, FILM COATED ORAL at 06:53

## 2020-02-08 RX ADMIN — AMLODIPINE BESYLATE SCH MG: 10 TABLET ORAL at 10:27

## 2020-02-08 RX ADMIN — PROPYLTHIOURACIL SCH MG: 50 TABLET ORAL at 13:42

## 2020-02-08 RX ADMIN — METFORMIN HYDROCHLORIDE SCH: 500 TABLET ORAL at 16:37

## 2020-02-08 RX ADMIN — PROPYLTHIOURACIL SCH MG: 50 TABLET ORAL at 06:53

## 2020-02-08 RX ADMIN — METFORMIN HYDROCHLORIDE SCH MG: 500 TABLET ORAL at 06:52

## 2020-02-08 RX ADMIN — PANTOPRAZOLE SODIUM SCH MG: 40 TABLET, DELAYED RELEASE ORAL at 10:26

## 2020-02-08 RX ADMIN — APIXABAN SCH MG: 5 TABLET, FILM COATED ORAL at 10:26

## 2020-02-08 RX ADMIN — HYDROXYZINE PAMOATE PRN MG: 50 CAPSULE ORAL at 10:26

## 2020-02-08 NOTE — DS
[facility] Rehab Discharge Summary





- [facility] Rehab Discharge Summary


Admission Date: 02/06/20


Discharge Date: 02/08/20





- History


Present History: Cocaine dependence


Pertinent Past History: 





Poly-substance abuse, asthma, hypertension, h/o DVT, hyperthyroidism and 

depression





- Discharge Physical Exam


Vital Signs: 


 Vital Signs











Temperature  98.2 F   02/08/20 16:34


 


Pulse Rate  112 H  02/08/20 16:34


 


Respiratory Rate  20   02/08/20 16:34


 


Blood Pressure  133/77   02/08/20 16:34


 


O2 Sat by Pulse Oximetry (%)      











Pertinent Admission Physical Exam Findings: 





 Laboratory Last Values











WBC  7.3 K/mm3 (4.0-10.0)   02/06/20  12:20    


 


RBC  4.86 M/mm3 (3.60-5.2)   02/06/20  12:20    


 


Hgb  13.5 GM/dL (10.7-15.3)   02/06/20  12:20    


 


Hct  40.3 % (32.4-45.2)   02/06/20  12:20    


 


MCV  83.0 fl (80-96)   02/06/20  12:20    


 


MCH  27.8 pg (25.7-33.7)   02/06/20  12:20    


 


MCHC  33.5 g/dl (32.0-36.0)   02/06/20  12:20    


 


RDW  14.9 % (11.6-15.6)   02/06/20  12:20    


 


Plt Count  258 K/MM3 (134-434)   02/06/20  12:20    


 


MPV  9.4 fl (7.5-11.1)   02/06/20  12:20    


 


Sodium  138 mmol/L (136-145)   02/06/20  12:20    


 


Potassium  4.3 mmol/L (3.5-5.1)   02/06/20  12:20    


 


Chloride  112 mmol/L ()  H  02/06/20  12:20    


 


Carbon Dioxide  19 mmol/L (21-32)  L  02/06/20  12:20    


 


Anion Gap  7 MMOL/L (8-16)  L  02/06/20  12:20    


 


BUN  10.4 mg/dL (7-18)   02/06/20  12:20    


 


Creatinine  0.9 mg/dL (0.55-1.3)   02/06/20  12:20    


 


Est GFR (CKD-EPI)AfAm  101.56   02/06/20  12:20    


 


Est GFR (CKD-EPI)NonAf  87.63   02/06/20  12:20    


 


Random Glucose  120 mg/dL ()  H  02/06/20  12:20    


 


Calcium  8.6 mg/dL (8.5-10.1)   02/06/20  12:20    


 


Total Bilirubin  0.6 mg/dL (0.2-1)   02/06/20  12:20    


 


AST  27 U/L (15-37)   02/06/20  12:20    


 


ALT  27 U/L (13-61)   02/06/20  12:20    


 


Alkaline Phosphatase  65 U/L ()   02/06/20  12:20    


 


Total Protein  7.6 g/dl (6.4-8.2)   02/06/20  12:20    


 


Albumin  3.0 g/dl (3.4-5.0)  L  02/06/20  12:20    


 


Urine Color  Yellow   02/07/20  08:00    


 


Urine Appearance  Cloudy   02/07/20  08:00    


 


Urine pH  7.0  (5.0-8.0)   02/07/20  08:00    


 


Ur Specific Gravity  1.019  (1.010-1.035)   02/07/20  08:00    


 


Urine Protein  Negative  (NEGATIVE)   02/07/20  08:00    


 


Urine Glucose (UA)  Negative  (NEGATIVE)   02/07/20  08:00    


 


Urine Ketones  Negative  (NEGATIVE)   02/07/20  08:00    


 


Urine Blood  Negative  (NEGATIVE)   02/07/20  08:00    


 


Urine Nitrite  Negative  (NEGATIVE)   02/07/20  08:00    


 


Urine Bilirubin  Negative  (NEGATIVE)   02/07/20  08:00    


 


Urine Urobilinogen  0.2 mg/dL (0.2-1.0)   02/07/20  08:00    


 


Ur Leukocyte Esterase  Negative  (NEGATIVE)   02/07/20  08:00    


 


RPR Titer  Nonreactive  (NONREACTIVE)   02/06/20  12:20    


 


HIV 1&2 Antibody Screen  Negative   02/06/20  12:20    


 


HIV P24 Antigen  Negative   02/06/20  12:20    














- Treatment


Discharge Condition: Discharge condition good





- Medication


Discharge Medications: 


Ambulatory Orders





Albuterol Sulfate Inhaler - [Ventolin Hfa Inhaler -] 2 inh PO Q4H PRN 02/06/20 


Amlodipine Besylate [Norvasc -] 10 mg PO DAILY 02/06/20 


Apixaban [Eliquis -] 5 mg PO Q12H 02/06/20 


Aripiprazole [Abilify -] 30 mg PO HS 02/06/20 


Bupropion HCl [Wellbutrin -] 75 mg PO HS 02/06/20 


Gabapentin 600 mg PO DAILY 02/06/20 


Metformin HCl [Glucophage] 1,000 mg PO BID 02/06/20 


Mupirocin Cream [Bactroban 2% Cream -] 1 applic TP BID 02/06/20 


Propylthiouracil 50 mg PO TID 02/06/20 


Topiramate [Topiramate ER] 100 mg PO TID 02/06/20 











- Medication-Assisted Treatment (MAT)


Medication-Assisted Treatment (MAT): No





- Discharge Instructions


Diet, activity, other medical instructions: 





Diet: Regular





Activity: As tolerated





Other medical instructions:








- Diagnosis


(1) Cocaine abuse


Current Visit: Yes   Status: Acute   





(2) Nicotine dependence


Current Visit: Yes   Status: Acute   


Qualifiers: 


   Nicotine product type: cigarettes   Substance use status: uncomplicated   

Qualified Code(s): F17.210 - Nicotine dependence, cigarettes, uncomplicated   





(3) Opioid dependence, uncomplicated


Current Visit: Yes   Status: Acute   





(4) Bipolar 1 disorder, depressed


Current Visit: Yes   Status: Chronic   





(5) Bronchial asthma


Current Visit: Yes   Status: Chronic   


Qualifiers: 


   Asthma severity: mild   Asthma persistence: intermittent 





(6) Depression


Current Visit: Yes   Status: Chronic   





(7) HTN (hypertension)


Current Visit: Yes   Status: Chronic   





(8) Hyperthyroidism


Current Visit: Yes   Status: Chronic   





(9) DVT of popliteal vein


Current Visit: No   Status: Resolved   





(10) Polycystic ovarian disease


Current Visit: No   Status: Chronic   





- AMA


Did Patient Leave Against Medical Advice: No


Additional Comments: 





Patient reports her father has secured a bed at [facility] for Monday, 

she refuses to wait till Monday. She is medically stable for discharge


Discussed with her counsellor.

## 2020-02-26 NOTE — ED ADULT TRIAGE NOTE - NS_BH TRG QUESTION3_ED_ALL_ED
"  Patient: Maurice Baer Date: 2/26/2020   YOB: 1966 Attending: Chucky Gates MD   48year old male Hospital Day: 3     Vascular Surgery    Postoperative day: 2    Operation procedure:    2- R CEA    Subjective:   Up to chair. Mild neck incisional pain. Taking Po well. Neuro remains intact. No focal weakness. Resp easy. No CP or SOB. No heparin drip needed per cardiology. [facility] cardiac cath and LE angiogram next week    Intraoperative event:   procedure was complicated by desaturation that coincided with administration of bolus of heparin. He developed pink, frothy sputum, was bronched intraop. Stat CXR with bilateral pulmonary infiltrates. EEG intraprocedure improved. He was maintained on phenylephrine gtt for intraop bp goals, which has been weaned off. Allergies:  ALLERGIES:  No Known Allergies     Reviewed:  Allergies, Medical History and Surgical History    Vital Last Value 24 Hour Range   Temperature 98.4 Â°F (36.9 Â°C) (02/26/20 0826) Temp  Min: 97.9 Â°F (36.6 Â°C)  Max: 99.5 Â°F (37.5 Â°C)   Pulse (!) 112 (02/26/20 0700) Pulse  Min: 103  Max: 117   Respiratory 20 (02/26/20 0700) Resp  Min: 11  Max: 28   Non-Invasive  Blood Pressure (!) 148/66 (02/26/20 0700) BP  Min: 93/51  Max: 148/66   Arterial   Blood Pressure 100/52 (02/25/20 1100) Arterial Line BP  Min: 100/52  Max: 113/57   Pulse Oximetry 96 % (02/26/20 0700) SpO2  Min: 92 %  Max: 100 %     Vital Today Admitted   Weight 94.2 kg (02/26/20 0600) Weight: 100.2 kg (02/24/20 0624)   Height N/A Height: 5' 7"" (170.2 cm) (02/24/20 0624)   BMI (body mass index) N/A BMI (Calculated): 34.61 (02/24/20 0624)     Weight over the past 48 Hours:  Patient Vitals for the past 48 hrs:   Weight   02/24/20 1302 95.1 kg   02/24/20 1400 95.1 kg   02/25/20 0500 96.8 kg   02/26/20 0600 94.2 kg        Hemodynamics:      Last Value 24 Hour Range   CVP   No data recorded   PAS/PAD   No data recorded   PCWP   No data recorded   CO   No data " recorded   CI   No data recorded   SV02   No data recorded       Intake/Output:    Last stool occurrence: 1 (02/25/20 1200)    I/O last 3 completed shifts: In: 976.8 [P.O.:600; I.V.:376.8]  Out: 2945 [Urine:2945]      Intake/Output Summary (Last 24 hours) at 2/26/2020 0932  Last data filed at 2/26/2020 0600  Gross per 24 hour   Intake 976.8 ml   Output 2575 ml   Net -1598.2 ml       PHYSICAL EXAM:  General: Alert, cooperative, appears well  Head: Normocephalic, atraumatic  Nose: Clear, no congestion, O2 cannula  Neck: Soft, supple, trachea midline. R lateral neck incision CDI. Scant swelling. No ecchymosis, hematoma or drainage. Heart: S1S2, regular rate and rhythm  Lungs: Clear  Upper, bases diminished  Abdomen: Soft, ND, NT  Extremities: BUE warm, + movement and grasp. Radial pulses +1 bilat  Neuro: A and O (Alert and oriented) x 3, CN (cranial nerves) II-XII grossly intact. Tongue midline. No focal weakness.  Equal Bilateral upper arm/hand grasp, strength, resistance    Laboratory Results:   Recent Labs   Lab 02/26/20  0508 02/25/20  0659 02/25/20  0320 02/25/20  0319 02/25/20  0042  02/24/20  1250   SODIUM 138  --  139  --   --   --  137  136   POTASSIUM 3.9  --  4.8  --   --   --  4.9  4.7   CHLORIDE 108*  --  107  --   --   --  104  104   CO2 23  --  26  --   --   --  25  26   BUN 27*  --  43*  --   --   --  33*  33*   CREATININE 0.65*  --  1.01  --   --   --  0.89  0.84   GLUCOSE 122*  --  133*  --   --   --  354*  351*   MG  --   --   --   --   --   --  2.1   WBC  --   --   --  15.5*  --   --  19.9*   HGB  --   --   --  10.7*  --   --  13.4   HCT  --   --   --  34.9*  --   --  43.3   PLT  --   --   --  213  --   --  267   PT  --   --   --   --   --   --  11.2   INR  --   --   --   --   --   --  1.0   PTT  --   --   --   --   --   --  24   BILIRUBIN  --   --   --   --   --   --  0.2  0.2   ALKPT  --   --   --   --   --   --  87  89   AST  --   --   --   --   --   --  25  25   GPT  --   --   --   -- --   --  30  30   RAPDTR  --  1.30* 1.50*  --  1.80*   < > 0.24*  0.29*    < > = values in this interval not displayed. Surgical Care Improvement Project:    Walters in Place: No    DVT/VTE (deep venous thrombosis/venous thromboembolism) prophylaxis:  VTE pharmacologic prophylaxis: Yes  VTE mechanical prophylaxis: Yes    Antibiotics D/C'd (discontinued) within 24 hours of surgery end: Yes    Central Line: No    Beta Blocker: Yes       Diagnosis/Comorbidities/Complications:  R carotid stenosis s/p R CEA  Post op pain  Post op blood loss anemia  Demand ischemia with elevated troponin  Acute pulmonary edema - improved  ICM  Heart failure  PAD  CAD  HTN  DM II  Hx L BKA 11/22 - Dr. Salazar Esposito    Plans/Recommendations:  Neuro remains intact post CEA, surgical incision CDI  Pulmonary status stable. resp easy 02 cannula  Troponin trending down. Cardiology following. No heparin drip. Likely cardiac cath and LE  angiogram next week. Daily dressing change R lateral neck - betadine and island dressing  Diet as tolerated  PT ok. Activity as tolerated.    On  mg  Medical management per intensivist.     Stable for transfer from Vascular Surgery viewpoint    Discharge Plan:    pending    Vilma Kennedy NP  Vascular Surgery  Pager: [phone] No

## 2020-05-05 ENCOUNTER — OUTPATIENT (OUTPATIENT)
Dept: OUTPATIENT SERVICES | Facility: HOSPITAL | Age: 30
LOS: 1 days | Discharge: HOME | End: 2020-05-05

## 2020-05-05 DIAGNOSIS — Z90.89 ACQUIRED ABSENCE OF OTHER ORGANS: Chronic | ICD-10-CM

## 2020-05-05 DIAGNOSIS — Z86.69 PERSONAL HISTORY OF OTHER DISEASES OF THE NERVOUS SYSTEM AND SENSE ORGANS: Chronic | ICD-10-CM

## 2020-05-05 DIAGNOSIS — Z00.8 ENCOUNTER FOR OTHER GENERAL EXAMINATION: ICD-10-CM

## 2020-05-05 PROBLEM — I26.99 OTHER PULMONARY EMBOLISM WITHOUT ACUTE COR PULMONALE: Chronic | Status: ACTIVE | Noted: 2019-11-23

## 2020-05-05 LAB
A1C WITH ESTIMATED AVERAGE GLUCOSE RESULT: 5.3 % — SIGNIFICANT CHANGE UP (ref 4–5.6)
ALBUMIN SERPL ELPH-MCNC: 4.1 G/DL — SIGNIFICANT CHANGE UP (ref 3.5–5.2)
ALP SERPL-CCNC: 59 U/L — SIGNIFICANT CHANGE UP (ref 30–115)
ALT FLD-CCNC: 13 U/L — SIGNIFICANT CHANGE UP (ref 0–41)
ANION GAP SERPL CALC-SCNC: 11 MMOL/L — SIGNIFICANT CHANGE UP (ref 7–14)
APPEARANCE UR: CLEAR — SIGNIFICANT CHANGE UP
AST SERPL-CCNC: 11 U/L — SIGNIFICANT CHANGE UP (ref 0–41)
BILIRUB SERPL-MCNC: <0.2 MG/DL — SIGNIFICANT CHANGE UP (ref 0.2–1.2)
BILIRUB UR-MCNC: NEGATIVE — SIGNIFICANT CHANGE UP
BUN SERPL-MCNC: 13 MG/DL — SIGNIFICANT CHANGE UP (ref 10–20)
CALCIUM SERPL-MCNC: 9.2 MG/DL — SIGNIFICANT CHANGE UP (ref 8.5–10.1)
CHLORIDE SERPL-SCNC: 103 MMOL/L — SIGNIFICANT CHANGE UP (ref 98–110)
CHOLEST SERPL-MCNC: 180 MG/DL — SIGNIFICANT CHANGE UP (ref 100–200)
CO2 SERPL-SCNC: 21 MMOL/L — SIGNIFICANT CHANGE UP (ref 17–32)
COLOR SPEC: YELLOW — SIGNIFICANT CHANGE UP
CREAT SERPL-MCNC: 0.7 MG/DL — SIGNIFICANT CHANGE UP (ref 0.7–1.5)
DIFF PNL FLD: NEGATIVE — SIGNIFICANT CHANGE UP
ESTIMATED AVERAGE GLUCOSE: 105 MG/DL — SIGNIFICANT CHANGE UP (ref 68–114)
GLUCOSE SERPL-MCNC: 95 MG/DL — SIGNIFICANT CHANGE UP (ref 70–99)
GLUCOSE UR QL: NEGATIVE MG/DL — SIGNIFICANT CHANGE UP
HAV IGM SER-ACNC: SIGNIFICANT CHANGE UP
HBV CORE IGM SER-ACNC: SIGNIFICANT CHANGE UP
HBV SURFACE AG SER-ACNC: SIGNIFICANT CHANGE UP
HCG UR QL: NEGATIVE — SIGNIFICANT CHANGE UP
HCT VFR BLD CALC: 39.9 % — SIGNIFICANT CHANGE UP (ref 37–47)
HCV AB S/CO SERPL IA: 5.84 S/CO — HIGH (ref 0–0.99)
HCV AB SERPL-IMP: REACTIVE
HDLC SERPL-MCNC: 38 MG/DL — LOW
HGB BLD-MCNC: 13.2 G/DL — SIGNIFICANT CHANGE UP (ref 12–16)
KETONES UR-MCNC: NEGATIVE — SIGNIFICANT CHANGE UP
LEUKOCYTE ESTERASE UR-ACNC: NEGATIVE — SIGNIFICANT CHANGE UP
LIPID PNL WITH DIRECT LDL SERPL: 112 MG/DL — SIGNIFICANT CHANGE UP (ref 4–129)
MAGNESIUM SERPL-MCNC: 1.9 MG/DL — SIGNIFICANT CHANGE UP (ref 1.8–2.4)
MCHC RBC-ENTMCNC: 27.4 PG — SIGNIFICANT CHANGE UP (ref 27–31)
MCHC RBC-ENTMCNC: 33.1 G/DL — SIGNIFICANT CHANGE UP (ref 32–37)
MCV RBC AUTO: 83 FL — SIGNIFICANT CHANGE UP (ref 81–99)
NITRITE UR-MCNC: NEGATIVE — SIGNIFICANT CHANGE UP
NRBC # BLD: 0 /100 WBCS — SIGNIFICANT CHANGE UP (ref 0–0)
PH UR: 7 — SIGNIFICANT CHANGE UP (ref 5–8)
PLATELET # BLD AUTO: 266 K/UL — SIGNIFICANT CHANGE UP (ref 130–400)
POTASSIUM SERPL-MCNC: 4.3 MMOL/L — SIGNIFICANT CHANGE UP (ref 3.5–5)
POTASSIUM SERPL-SCNC: 4.3 MMOL/L — SIGNIFICANT CHANGE UP (ref 3.5–5)
PROT SERPL-MCNC: 7.9 G/DL — SIGNIFICANT CHANGE UP (ref 6–8)
PROT UR-MCNC: NEGATIVE MG/DL — SIGNIFICANT CHANGE UP
RBC # BLD: 4.81 M/UL — SIGNIFICANT CHANGE UP (ref 4.2–5.4)
RBC # FLD: 14 % — SIGNIFICANT CHANGE UP (ref 11.5–14.5)
SODIUM SERPL-SCNC: 135 MMOL/L — SIGNIFICANT CHANGE UP (ref 135–146)
SP GR SPEC: 1.02 — SIGNIFICANT CHANGE UP (ref 1.01–1.03)
T PALLIDUM AB TITR SER: NEGATIVE — SIGNIFICANT CHANGE UP
TOTAL CHOLESTEROL/HDL RATIO MEASUREMENT: 4.7 RATIO — SIGNIFICANT CHANGE UP (ref 4–5.5)
TRIGL SERPL-MCNC: 246 MG/DL — HIGH (ref 10–149)
UROBILINOGEN FLD QL: 0.2 MG/DL — SIGNIFICANT CHANGE UP (ref 0.2–0.2)
WBC # BLD: 6.52 K/UL — SIGNIFICANT CHANGE UP (ref 4.8–10.8)
WBC # FLD AUTO: 6.52 K/UL — SIGNIFICANT CHANGE UP (ref 4.8–10.8)

## 2020-05-06 LAB
GAMMA INTERFERON BACKGROUND BLD IA-ACNC: 0.04 IU/ML — SIGNIFICANT CHANGE UP
HCV RNA FLD QL NAA+PROBE: SIGNIFICANT CHANGE UP
HCV RNA SPEC QL PROBE+SIG AMP: SIGNIFICANT CHANGE UP
M TB IFN-G BLD-IMP: NEGATIVE — SIGNIFICANT CHANGE UP
M TB IFN-G CD4+ BCKGRND COR BLD-ACNC: 0.01 IU/ML — SIGNIFICANT CHANGE UP
M TB IFN-G CD4+CD8+ BCKGRND COR BLD-ACNC: 0.02 IU/ML — SIGNIFICANT CHANGE UP
QUANT TB PLUS MITOGEN MINUS NIL: 6.63 IU/ML — SIGNIFICANT CHANGE UP

## 2020-05-18 ENCOUNTER — OUTPATIENT (OUTPATIENT)
Dept: OUTPATIENT SERVICES | Facility: HOSPITAL | Age: 30
LOS: 1 days | Discharge: HOME | End: 2020-05-18

## 2020-05-18 DIAGNOSIS — Z90.89 ACQUIRED ABSENCE OF OTHER ORGANS: Chronic | ICD-10-CM

## 2020-05-18 DIAGNOSIS — I49.9 CARDIAC ARRHYTHMIA, UNSPECIFIED: ICD-10-CM

## 2020-05-18 DIAGNOSIS — Z86.69 PERSONAL HISTORY OF OTHER DISEASES OF THE NERVOUS SYSTEM AND SENSE ORGANS: Chronic | ICD-10-CM

## 2020-06-08 ENCOUNTER — EMERGENCY (EMERGENCY)
Facility: HOSPITAL | Age: 30
LOS: 0 days | Discharge: HOME | End: 2020-06-08
Attending: EMERGENCY MEDICINE | Admitting: EMERGENCY MEDICINE
Payer: MEDICARE

## 2020-06-08 VITALS
DIASTOLIC BLOOD PRESSURE: 86 MMHG | OXYGEN SATURATION: 97 % | WEIGHT: 229.94 LBS | SYSTOLIC BLOOD PRESSURE: 135 MMHG | HEIGHT: 66 IN | HEART RATE: 112 BPM | RESPIRATION RATE: 18 BRPM

## 2020-06-08 VITALS
DIASTOLIC BLOOD PRESSURE: 74 MMHG | HEART RATE: 97 BPM | SYSTOLIC BLOOD PRESSURE: 160 MMHG | OXYGEN SATURATION: 98 % | RESPIRATION RATE: 18 BRPM

## 2020-06-08 DIAGNOSIS — R11.2 NAUSEA WITH VOMITING, UNSPECIFIED: ICD-10-CM

## 2020-06-08 DIAGNOSIS — Z79.899 OTHER LONG TERM (CURRENT) DRUG THERAPY: ICD-10-CM

## 2020-06-08 DIAGNOSIS — E11.9 TYPE 2 DIABETES MELLITUS WITHOUT COMPLICATIONS: ICD-10-CM

## 2020-06-08 DIAGNOSIS — F14.10 COCAINE ABUSE, UNCOMPLICATED: ICD-10-CM

## 2020-06-08 DIAGNOSIS — Z90.89 ACQUIRED ABSENCE OF OTHER ORGANS: Chronic | ICD-10-CM

## 2020-06-08 DIAGNOSIS — J45.909 UNSPECIFIED ASTHMA, UNCOMPLICATED: ICD-10-CM

## 2020-06-08 DIAGNOSIS — R11.10 VOMITING, UNSPECIFIED: ICD-10-CM

## 2020-06-08 DIAGNOSIS — Z98.890 OTHER SPECIFIED POSTPROCEDURAL STATES: ICD-10-CM

## 2020-06-08 DIAGNOSIS — Z88.8 ALLERGY STATUS TO OTHER DRUGS, MEDICAMENTS AND BIOLOGICAL SUBSTANCES STATUS: ICD-10-CM

## 2020-06-08 DIAGNOSIS — Z86.69 PERSONAL HISTORY OF OTHER DISEASES OF THE NERVOUS SYSTEM AND SENSE ORGANS: Chronic | ICD-10-CM

## 2020-06-08 DIAGNOSIS — E05.90 THYROTOXICOSIS, UNSPECIFIED WITHOUT THYROTOXIC CRISIS OR STORM: ICD-10-CM

## 2020-06-08 DIAGNOSIS — I10 ESSENTIAL (PRIMARY) HYPERTENSION: ICD-10-CM

## 2020-06-08 LAB
ALBUMIN SERPL ELPH-MCNC: 4.2 G/DL — SIGNIFICANT CHANGE UP (ref 3.5–5.2)
ALP SERPL-CCNC: 59 U/L — SIGNIFICANT CHANGE UP (ref 30–115)
ALT FLD-CCNC: 32 U/L — SIGNIFICANT CHANGE UP (ref 0–41)
ANION GAP SERPL CALC-SCNC: 11 MMOL/L — SIGNIFICANT CHANGE UP (ref 7–14)
AST SERPL-CCNC: 26 U/L — SIGNIFICANT CHANGE UP (ref 0–41)
BASOPHILS # BLD AUTO: 0.02 K/UL — SIGNIFICANT CHANGE UP (ref 0–0.2)
BASOPHILS NFR BLD AUTO: 0.2 % — SIGNIFICANT CHANGE UP (ref 0–1)
BILIRUB DIRECT SERPL-MCNC: <0.2 MG/DL — SIGNIFICANT CHANGE UP (ref 0–0.2)
BILIRUB INDIRECT FLD-MCNC: >0.2 MG/DL — SIGNIFICANT CHANGE UP (ref 0.2–1.2)
BILIRUB SERPL-MCNC: 0.4 MG/DL — SIGNIFICANT CHANGE UP (ref 0.2–1.2)
BUN SERPL-MCNC: 20 MG/DL — SIGNIFICANT CHANGE UP (ref 10–20)
CALCIUM SERPL-MCNC: 9.8 MG/DL — SIGNIFICANT CHANGE UP (ref 8.5–10.1)
CHLORIDE SERPL-SCNC: 103 MMOL/L — SIGNIFICANT CHANGE UP (ref 98–110)
CO2 SERPL-SCNC: 26 MMOL/L — SIGNIFICANT CHANGE UP (ref 17–32)
CREAT SERPL-MCNC: 0.6 MG/DL — LOW (ref 0.7–1.5)
EOSINOPHIL # BLD AUTO: 0.03 K/UL — SIGNIFICANT CHANGE UP (ref 0–0.7)
EOSINOPHIL NFR BLD AUTO: 0.2 % — SIGNIFICANT CHANGE UP (ref 0–8)
GLUCOSE SERPL-MCNC: 115 MG/DL — HIGH (ref 70–99)
HCG SERPL QL: NEGATIVE — SIGNIFICANT CHANGE UP
HCT VFR BLD CALC: 46.7 % — SIGNIFICANT CHANGE UP (ref 37–47)
HGB BLD-MCNC: 15.2 G/DL — SIGNIFICANT CHANGE UP (ref 12–16)
IMM GRANULOCYTES NFR BLD AUTO: 0.3 % — SIGNIFICANT CHANGE UP (ref 0.1–0.3)
LACTATE SERPL-SCNC: 1.4 MMOL/L — SIGNIFICANT CHANGE UP (ref 0.7–2)
LIDOCAIN IGE QN: 17 U/L — SIGNIFICANT CHANGE UP (ref 7–60)
LYMPHOCYTES # BLD AUTO: 25.4 % — SIGNIFICANT CHANGE UP (ref 20.5–51.1)
LYMPHOCYTES # BLD AUTO: 3.13 K/UL — SIGNIFICANT CHANGE UP (ref 1.2–3.4)
MAGNESIUM SERPL-MCNC: 2.1 MG/DL — SIGNIFICANT CHANGE UP (ref 1.8–2.4)
MCHC RBC-ENTMCNC: 27.2 PG — SIGNIFICANT CHANGE UP (ref 27–31)
MCHC RBC-ENTMCNC: 32.5 G/DL — SIGNIFICANT CHANGE UP (ref 32–37)
MCV RBC AUTO: 83.7 FL — SIGNIFICANT CHANGE UP (ref 81–99)
MONOCYTES # BLD AUTO: 0.96 K/UL — HIGH (ref 0.1–0.6)
MONOCYTES NFR BLD AUTO: 7.8 % — SIGNIFICANT CHANGE UP (ref 1.7–9.3)
NEUTROPHILS # BLD AUTO: 8.15 K/UL — HIGH (ref 1.4–6.5)
NEUTROPHILS NFR BLD AUTO: 66.1 % — SIGNIFICANT CHANGE UP (ref 42.2–75.2)
NRBC # BLD: 0 /100 WBCS — SIGNIFICANT CHANGE UP (ref 0–0)
PLATELET # BLD AUTO: 320 K/UL — SIGNIFICANT CHANGE UP (ref 130–400)
POTASSIUM SERPL-MCNC: 4.3 MMOL/L — SIGNIFICANT CHANGE UP (ref 3.5–5)
POTASSIUM SERPL-SCNC: 4.3 MMOL/L — SIGNIFICANT CHANGE UP (ref 3.5–5)
PROT SERPL-MCNC: 8.2 G/DL — HIGH (ref 6–8)
RBC # BLD: 5.58 M/UL — HIGH (ref 4.2–5.4)
RBC # FLD: 13.5 % — SIGNIFICANT CHANGE UP (ref 11.5–14.5)
SODIUM SERPL-SCNC: 140 MMOL/L — SIGNIFICANT CHANGE UP (ref 135–146)
WBC # BLD: 12.33 K/UL — HIGH (ref 4.8–10.8)
WBC # FLD AUTO: 12.33 K/UL — HIGH (ref 4.8–10.8)

## 2020-06-08 PROCEDURE — 99284 EMERGENCY DEPT VISIT MOD MDM: CPT

## 2020-06-08 RX ORDER — ONDANSETRON 8 MG/1
4 TABLET, FILM COATED ORAL ONCE
Refills: 0 | Status: COMPLETED | OUTPATIENT
Start: 2020-06-08 | End: 2020-06-08

## 2020-06-08 RX ORDER — SODIUM CHLORIDE 9 MG/ML
1000 INJECTION, SOLUTION INTRAVENOUS ONCE
Refills: 0 | Status: COMPLETED | OUTPATIENT
Start: 2020-06-08 | End: 2020-06-08

## 2020-06-08 RX ORDER — PROPYLTHIOURACIL 50 MG
100 TABLET ORAL
Qty: 0 | Refills: 0 | DISCHARGE

## 2020-06-08 RX ORDER — ARIPIPRAZOLE 15 MG/1
1 TABLET ORAL
Qty: 0 | Refills: 0 | DISCHARGE

## 2020-06-08 RX ORDER — BUPROPION HYDROCHLORIDE 150 MG/1
1 TABLET, EXTENDED RELEASE ORAL
Qty: 0 | Refills: 0 | DISCHARGE

## 2020-06-08 RX ORDER — METHADONE HYDROCHLORIDE 40 MG/1
0 TABLET ORAL
Qty: 0 | Refills: 0 | DISCHARGE

## 2020-06-08 RX ORDER — AMLODIPINE BESYLATE 2.5 MG/1
1 TABLET ORAL
Qty: 0 | Refills: 0 | DISCHARGE

## 2020-06-08 RX ORDER — PANTOPRAZOLE SODIUM 20 MG/1
40 TABLET, DELAYED RELEASE ORAL ONCE
Refills: 0 | Status: COMPLETED | OUTPATIENT
Start: 2020-06-08 | End: 2020-06-08

## 2020-06-08 RX ADMIN — SODIUM CHLORIDE 1000 MILLILITER(S): 9 INJECTION, SOLUTION INTRAVENOUS at 06:57

## 2020-06-08 RX ADMIN — SODIUM CHLORIDE 1000 MILLILITER(S): 9 INJECTION, SOLUTION INTRAVENOUS at 07:57

## 2020-06-08 RX ADMIN — PANTOPRAZOLE SODIUM 40 MILLIGRAM(S): 20 TABLET, DELAYED RELEASE ORAL at 07:26

## 2020-06-08 RX ADMIN — ONDANSETRON 4 MILLIGRAM(S): 8 TABLET, FILM COATED ORAL at 06:57

## 2020-06-08 NOTE — ED PROVIDER NOTE - NSFOLLOWUPINSTRUCTIONS_ED_ALL_ED_FT
Nausea / Vomiting    Nausea is the feeling that you have to vomit. As nausea gets worse, it can lead to vomiting. Vomiting puts you at an increased risk for dehydration. Older adults and people with other diseases or a weak immune system are at higher risk for dehydration. Drink clear fluids in small but frequent amounts as tolerated. Eat bland, easy-to-digest foods in small amounts as tolerated.    SEEK IMMEDIATE MEDICAL CARE IF YOU HAVE ANY OF THE FOLLOWING SYMPTOMS: fever, inability to keep sufficient fluids down, black or bloody vomitus, black or bloody stools, lightheadedness/dizziness, chest pain, severe headache, rash, shortness of breath, cold or clammy skin, confusion, pain with urination, or any signs of dehydration.     Substance Use Disorder  Substance use disorder occurs when a person's repeated use of drugs or alcohol interferes with his or her ability to be productive. This disorder can cause problems with mental and physical health. It can affect your ability to have healthy relationships, and it can keep you from being able to meet your responsibilities at work, home, or school. It can also lead to addiction, which is a condition in which the person cannot stop using the substance consistently for a period of time.  Addiction changes the way the brain works. Because of these changes, addiction is a chronic condition. Substance use disorder can be mild, moderate, or severe.  The most commonly abused substances include:  Alcohol.Tobacco.Marijuana.Stimulants, such as cocaine and methamphetamine.Hallucinogens, such as LSD and PCP.Opioids, such as some prescription pain medicines and heroin.What are the causes?  This condition may develop due to many complex social, psychological, or physical reasons, such as:  Stress.Abuse.Peer pressure.Anxiety or depression.What increases the risk?  This condition is more likely to develop in people who:  Use substances to cope with stress.Have been abused.Have a mental health disorder, such as depression.Have a family history of substance use disorder.What are the signs or symptoms?  Symptoms of this condition include:  Using the substance for longer periods of time or at a higher dosage than what is normal or intended.Having a lasting desire to use the substance.Being unable to slow down or stop the use of the substance.Spending an abnormal amount of time getting the substance, using the substance, or recovering from using the substance.Using the substance in a way that interferes with work, school, social activities, and personal relationships.Using the substance even after having negative consequences, such as:  Health problems.Legal or financial troubles.Job loss.Relationship problems.Needing more and more of the substance to get the same effect (developing tolerance).Experiencing unpleasant symptoms if you do not use the substance (withdrawal).Using the substance to avoid withdrawal symptoms.How is this diagnosed?  This condition may be diagnosed based on:  A physical exam.Your history of substance use.Your symptoms. This includes:  How substance use affects your life.Changes in personality, behaviors, and mood.Having at least two symptoms of substance use disorder within a 12-month period.Health issues related to substance use, such as liver damage, shortness of breath, fatigue, cough, or heart problems.Blood or urine tests to screen for alcohol and drugs.How is this treated?  This condition may be treated by:  Stopping substance use safely. This may require taking medicines and being closely monitored for several days.Taking part in group and individual counseling from mental health providers who help people with substance use disorder.Staying at a live-in (residential) treatment center for several days or weeks.Attending daily counseling sessions at a treatment center.Taking medicine as told by your health care provider:  To ease symptoms and prevent complications during withdrawal.To treat other mental health issues, such as depression or anxiety.To block cravings by causing the same effects as the substance.To block the effects of the substance or replace good sensations with unpleasant ones.Participating in a support group to share your experience with others who are going through the same thing. These groups are an important part of long-term recovery for many people.Recovery can be a long process. Many people who undergo treatment start using the substance again after stopping (relapse). If you relapse, that does not mean that treatment will not work.  Follow these instructions at home:     Take over-the-counter and prescription medicines only as told by your health care provider.Do not use any drugs or alcohol.Avoid temptations or triggers that you associate with your use of the substance.Learn and practice techniques for managing stress.Have a plan for vulnerable moments. Get phone numbers of people who are willing to help and who are committed to your recovery.Attend support groups on a regular basis. These groups include 12-step programs like Alcoholics Anonymous and Narcotics Anonymous.Keep all follow-up visits as told by your health care providers. This is important. This includes continuing to work with therapists and support groups.Contact a health care provider if:  You cannot take your medicines as told.Your symptoms get worse.You have trouble resisting the urge to use drugs or alcohol.Get help right away if you:  Relapse.Think that you may have taken too much of a drug. The hotline of the National Poison Control Center is (375) 599-1990.Have signs of an overdose. Symptoms include:  Chest pain.Confusion.Sleepiness or difficulty staying awake.Slowed breathing.Nausea or vomiting.A seizure.Have serious thoughts about hurting yourself or someone else.Drug overdose is an emergency. Do not wait to see if the symptoms will go away. Get medical help right away. Call your local emergency services (605 in the [place] Do not drive yourself to the hospital.   If you ever feel like you may hurt yourself or others, or have thoughts about taking your own life, get help right away. You can go to your nearest emergency department or call:   Your local emergency services (013 in the [place] A suicide crisis helpline, such as the National Suicide Prevention Lifeline at 1-563.623.9506. This is open 24 hours a day. Summary  Substance use disorder occurs when a person's repeated use of drugs or alcohol interferes with his or her ability to be productive.Taking part in group and individual counseling from mental health providers is a common treatment for people with substance use disorder.Recovery can be a long process. Many people who undergo treatment start using the substance again after stopping (relapse). A relapse does not mean that treatment will not work.Attend support groups such as Alcoholics Anonymous and Narcotics Anonymous. These groups are an important part of long-term recovery for many people.This information is not intended to replace advice given to you by your health care provider. Make sure you discuss any questions you have with your health care provider.

## 2020-06-08 NOTE — ED PROVIDER NOTE - NSFOLLOWUPCLINICS_GEN_ALL_ED_FT
[facility]  Detox Mgmt  [address] [place] [address]  Phone: [phone]  Fax:   Follow Up Time: 1-3 Days

## 2020-06-08 NOTE — ED ADULT TRIAGE NOTE - CHIEF COMPLAINT QUOTE
c/o vomiting and abdominal pain, patient states, "I have not been taking my methadone and I relapsed and used crystal meth 2 days ago"

## 2020-06-08 NOTE — ED PROVIDER NOTE - PHYSICAL EXAMINATION
Physical Exam    Vital Signs: I have reviewed the initial vital signs.  Constitutional: well-nourished, appears stated age, no acute distress  Eyes: Conjunctiva pink, Sclera clear,.  Cardiovascular: S1 and S2, tachycardic, regular rhythm, well-perfused extremities, radial pulses equal and 2+  Respiratory: unlabored respiratory effort, clear to auscultation bilaterally no wheezing, rales and rhonchi  Gastrointestinal: soft, non-tender abdomen, no pulsatile mass, normal bowl sounds  Musculoskeletal: supple neck, no lower extremity edema, no midline tenderness  Integumentary: warm, dry, no rash  Neurologic: awake, alert, nvi

## 2020-06-08 NOTE — ED PROVIDER NOTE - PROGRESS NOTE DETAILS
Methadone clinic states patient last received methadone June 2nd 2020. They state they will give her the dose if she is discharge before 2 pm. They confirmed the 40mg dose. Pt no distress, sleeping comfortably Discussed labs and imaging. Discussed need to follow up methadone. Discussed signs and symptoms to return to the ED for. Pt understands and agrees with discharge plan

## 2020-06-08 NOTE — ED PROVIDER NOTE - PATIENT PORTAL LINK FT
You can access the FollowMyHealth Patient Portal offered by [facility] by registering at the following website: [email or web address]. By joining [employer]’s FollowMyHealth portal, you will also be able to view your health information using other applications (apps) compatible with our system.

## 2020-06-08 NOTE — ED PROVIDER NOTE - OBJECTIVE STATEMENT
28 yo female, pmh of ivda, on methadone, last dose was 48 hours ago, used crystal meth at that time, dm, presents to ed fo nvd, muscle cramps, abd pain. nbnb, generalized pain, no specific radiation started with sxs today. denies fever, chills, sob, cp, le swelling, back pain.

## 2020-06-08 NOTE — ED PROVIDER NOTE - ATTENDING CONTRIBUTION TO CARE
29 y F PMH PCOS, IVDU on methadone, had a recent relapse of drug use - heroin and crystal meth and possibly others, last use 48 hours ago, pw nausea, vomiting x15+, diarrhea few episodes, nbnbnm, and abd pain all starting early this morning, diffuse though primarily epigastric and crampy. Accompanied by sweats, shaking chills. Patient of [facility] Detox program. no fever black or bloody stool, dysuria, hematuria, SOB, cough.  Exam: NAD, NCAT, HEENT: dry mm, EOMI, PERRLA, Neck: supple, nontender, nl ROM, Heart: tachyRR, no murmur, Lungs: BCTA, no signs of increased WOB, Abd: Epigastric discomfort to palpation. ND, no guarding or rebound, no hernia palpated, no CVAT. MSK: chest, back, and ext nontender, nl rom, no deformity. Neuro: A&Ox3, normal strength, nl sensation throughout, normal speech.  A/P: Eval for electrolyte disturbances or renal dysfunction. No acute instability. Symptom control, contact detox program for methadone dose. Reassess. 29 y F PMH PCOS, IVDU on methadone, had a recent relapse of drug use - crystal meth and possibly others, last use 48 hours ago, pw nausea, vomiting x15+, diarrhea few episodes, nbnbnm, and abd pain all starting early this morning, diffuse though primarily epigastric and crampy. Accompanied by sweats, shaking chills. Patient of [facility] Detox program. no fever black or bloody stool, dysuria, hematuria, SOB, cough.  Exam: NAD, NCAT, HEENT: dry mm, EOMI, PERRLA, Neck: supple, nontender, nl ROM, Heart: tachyRR, no murmur, Lungs: BCTA, no signs of increased WOB, Abd: Epigastric discomfort to palpation. ND, no guarding or rebound, no hernia palpated, no CVAT. MSK: chest, back, and ext nontender, nl rom, no deformity. Neuro: A&Ox3, normal strength, nl sensation throughout, normal speech.  A/P: Eval for electrolyte disturbances or renal dysfunction. No acute instability. Symptom control, contact detox program for methadone dose. Reassess.

## 2020-06-08 NOTE — ED PROVIDER NOTE - NS ED ROS FT
Constitutional: (-) fever, (-) chills, (+) myalgias   Eyes: (-) visual changes  ENT: (-) nasal congestions  Cardiovascular: (-) chest pain, (-) syncope  Respiratory: (-) cough, (-) shortness of breath, (-) dyspnea,   Gastrointestinal: (+) vomiting, (+) diarrhea, (+)nausea,  Musculoskeletal: (-) neck pain, (-) back pain, (-) joint pain,  Integumentary: (-) rash, (-) edema, (-) bruises  Neurological: (-) headache, (-) loc, (-) dizziness, (-) tingling, (-)numbness  Peripheral Vascular: (-) leg swelling  :  (-)dysuria,  (-) hematuria  Allergic/Immunologic: (-) pruritus

## 2020-06-08 NOTE — ED PROVIDER NOTE - CLINICAL SUMMARY MEDICAL DECISION MAKING FREE TEXT BOX
pw nausea, vomiting, and diarrhea starting 2 days since last use of methadone or crystal meth, possible other drug use. Labs gathered to assess for hypokalemia or hypomagnesemia, show normal electrolytes and good renal function. Consulted with detox program which administers her methadone who relates they will dose patient after discharge at the clinic. Tolerating PO and feels improved. Patient to be discharged from ED. Any available test results were discussed with patient and/or family. Verbal instructions given, including instructions to return to ED immediately for any new, worsening, or concerning symptoms. Patient endorsed understanding. Written discharge instructions additionally given, including follow-up plan.

## 2020-06-10 ENCOUNTER — OUTPATIENT (OUTPATIENT)
Dept: OUTPATIENT SERVICES | Facility: HOSPITAL | Age: 30
LOS: 1 days | Discharge: HOME | End: 2020-06-10

## 2020-06-10 DIAGNOSIS — Z90.89 ACQUIRED ABSENCE OF OTHER ORGANS: Chronic | ICD-10-CM

## 2020-06-10 DIAGNOSIS — I49.9 CARDIAC ARRHYTHMIA, UNSPECIFIED: ICD-10-CM

## 2020-06-10 DIAGNOSIS — Z86.69 PERSONAL HISTORY OF OTHER DISEASES OF THE NERVOUS SYSTEM AND SENSE ORGANS: Chronic | ICD-10-CM

## 2020-06-16 ENCOUNTER — EMERGENCY (EMERGENCY)
Facility: HOSPITAL | Age: 30
LOS: 0 days | Discharge: HOME | End: 2020-06-16
Attending: EMERGENCY MEDICINE | Admitting: EMERGENCY MEDICINE
Payer: MEDICARE

## 2020-06-16 VITALS
WEIGHT: 240.08 LBS | HEART RATE: 93 BPM | SYSTOLIC BLOOD PRESSURE: 119 MMHG | DIASTOLIC BLOOD PRESSURE: 57 MMHG | OXYGEN SATURATION: 98 % | RESPIRATION RATE: 18 BRPM | TEMPERATURE: 97 F | HEIGHT: 66 IN

## 2020-06-16 DIAGNOSIS — Z79.899 OTHER LONG TERM (CURRENT) DRUG THERAPY: ICD-10-CM

## 2020-06-16 DIAGNOSIS — E11.9 TYPE 2 DIABETES MELLITUS WITHOUT COMPLICATIONS: ICD-10-CM

## 2020-06-16 DIAGNOSIS — E32.9 DISEASE OF THYMUS, UNSPECIFIED: ICD-10-CM

## 2020-06-16 DIAGNOSIS — E05.90 THYROTOXICOSIS, UNSPECIFIED WITHOUT THYROTOXIC CRISIS OR STORM: ICD-10-CM

## 2020-06-16 DIAGNOSIS — Z90.89 ACQUIRED ABSENCE OF OTHER ORGANS: Chronic | ICD-10-CM

## 2020-06-16 DIAGNOSIS — I10 ESSENTIAL (PRIMARY) HYPERTENSION: ICD-10-CM

## 2020-06-16 DIAGNOSIS — Z88.8 ALLERGY STATUS TO OTHER DRUGS, MEDICAMENTS AND BIOLOGICAL SUBSTANCES STATUS: ICD-10-CM

## 2020-06-16 DIAGNOSIS — Z86.69 PERSONAL HISTORY OF OTHER DISEASES OF THE NERVOUS SYSTEM AND SENSE ORGANS: Chronic | ICD-10-CM

## 2020-06-16 DIAGNOSIS — N61.1 ABSCESS OF THE BREAST AND NIPPLE: ICD-10-CM

## 2020-06-16 DIAGNOSIS — L02.91 CUTANEOUS ABSCESS, UNSPECIFIED: ICD-10-CM

## 2020-06-16 DIAGNOSIS — Z88.1 ALLERGY STATUS TO OTHER ANTIBIOTIC AGENTS STATUS: ICD-10-CM

## 2020-06-16 DIAGNOSIS — J45.909 UNSPECIFIED ASTHMA, UNCOMPLICATED: ICD-10-CM

## 2020-06-16 PROCEDURE — 99283 EMERGENCY DEPT VISIT LOW MDM: CPT

## 2020-06-16 RX ORDER — IBUPROFEN 200 MG
800 TABLET ORAL ONCE
Refills: 0 | Status: COMPLETED | OUTPATIENT
Start: 2020-06-16 | End: 2020-06-16

## 2020-06-16 RX ORDER — CEPHALEXIN 500 MG
500 CAPSULE ORAL ONCE
Refills: 0 | Status: COMPLETED | OUTPATIENT
Start: 2020-06-16 | End: 2020-06-16

## 2020-06-16 RX ORDER — AZTREONAM 2 G
1 VIAL (EA) INJECTION
Qty: 20 | Refills: 0
Start: 2020-06-16 | End: 2020-06-25

## 2020-06-16 RX ORDER — CEPHALEXIN 500 MG
1 CAPSULE ORAL
Qty: 40 | Refills: 0
Start: 2020-06-16 | End: 2020-06-25

## 2020-06-16 RX ORDER — HALOPERIDOL DECANOATE 100 MG/ML
2.5 INJECTION INTRAMUSCULAR ONCE
Refills: 0 | Status: DISCONTINUED | OUTPATIENT
Start: 2020-06-16 | End: 2020-06-16

## 2020-06-16 RX ADMIN — Medication 800 MILLIGRAM(S): at 12:29

## 2020-06-16 RX ADMIN — Medication 500 MILLIGRAM(S): at 12:28

## 2020-06-16 RX ADMIN — Medication 1 TABLET(S): at 12:28

## 2020-06-16 NOTE — ED PROVIDER NOTE - OBJECTIVE STATEMENT
28 y/o female with hx of PCOS, DVT on eliquis, IVDA presents with left breast abscess worsening over past few days. patient with drainage of pus from wound. patient denies any fever,chills. patient last drug usage over a week ago. patient without any nipple discharge. no swelling to breast. +warmpth, redness and draiange.

## 2020-06-16 NOTE — ED PROVIDER NOTE - NS ED ROS FT
Review of Systems    Constitutional: (-) fever or chills  respiratory: (-) cough (-) shortness of breath  Cardiovascular: (-) syncope, palpitations or chest pain  Integumentary: (+)abscess to breast   Neurological: (-) altered mental status, headache or head injury

## 2020-06-16 NOTE — ED PROVIDER NOTE - PROGRESS NOTE DETAILS
purulence expressed from wound in the ed. advised patient for warm compresses and antibx. follow up breast clinic. patient advised of return precautions of worsening of symptoms

## 2020-06-16 NOTE — ED PROVIDER NOTE - ATTENDING CONTRIBUTION TO CARE
breast abscess from skin popping denies fb on exam afebrile and nontoxic with spontaneously draining abscess upper inner quadrant of left breast with mild surrounding erythema with some axillary adenopathy, no nipple discharge, no crepitus, will d/c on abx, breast clinic f/u, warm soaks. Patient counseled regarding conditions which should prompt return.

## 2020-06-16 NOTE — ED PROVIDER NOTE - NSFOLLOWUPCLINICS_GEN_ALL_ED_FT
[facility]  Breast  [address]  [place] [address]  Phone: [phone]  Fax:   Follow Up Time:

## 2020-06-16 NOTE — ED PROVIDER NOTE - PATIENT PORTAL LINK FT
You can access the FollowMyHealth Patient Portal offered by [facility] by registering at the following website: [email or web address]. By joining [employer]’s FollowMyHealth portal, you will also be able to view your health information using other applications (apps) compatible with our system.

## 2020-06-16 NOTE — ED PROVIDER NOTE - PHYSICAL EXAMINATION
skin: left breast abscess 11 o'clock with central ulceration , purulent drainage , surrounding erythema and induration. no lymphangitis . +tender to palpation . no nipple discharge      heme: left axillary adenopathy appreciated

## 2020-06-16 NOTE — ED PROVIDER NOTE - NSFOLLOWUPINSTRUCTIONS_ED_ALL_ED_FT
Abscess    An abscess is an infected area that contains a collection of pus and debris. It can occur in almost any part of the body and occurs when the tissue gets infection. Symptoms include a painful mass that is red, warm, tender that might break open and HAVE drainage. If your health care provider gave you antibiotics make sure to take the full course and do not stop even if feeling better.     SEEK IMMEDIATE MEDICAL CARE IF YOU HAVE ANY OF THE FOLLOWING SYMPTOMS: chills, fever, muscle aches, or red streaking from the area.  \

## 2020-07-08 ENCOUNTER — OUTPATIENT (OUTPATIENT)
Dept: OUTPATIENT SERVICES | Facility: HOSPITAL | Age: 30
LOS: 1 days | Discharge: HOME | End: 2020-07-08

## 2020-07-08 DIAGNOSIS — Z90.89 ACQUIRED ABSENCE OF OTHER ORGANS: Chronic | ICD-10-CM

## 2020-07-08 DIAGNOSIS — Z86.69 PERSONAL HISTORY OF OTHER DISEASES OF THE NERVOUS SYSTEM AND SENSE ORGANS: Chronic | ICD-10-CM

## 2020-07-13 NOTE — ED PROVIDER NOTE - CHIEF COMPLAINT
Psychoeducation Group Note    PATIENT'S NAME: Chris Stockton  MRN:   [id number]  :   1957  ACCT. NUMBER: [id number]  DATE OF SERVICE: 20  START TIME: 11:00 AM  END TIME: 11:50 AM  FACILITATOR: Lavelle Wilhelm OT  TOPIC: [facility] OT Group: Lifestyle Balance and Structure  Adult Partial Hospitalization Program  TRACK: 1    Telemedicine Visit: The patient's condition can be safely assessed and treated via synchronous audio and visual telemedicine encounter.      Reason for Telemedicine Visit: Services only offered telehealth and Covid 19    Originating Site (Patient Location): Patient's home    Distant Site (Provider Location): Provider Remote Setting    Consent:  The patient/guardian has verbally consented to: the potential risks and benefits of telemedicine (video visit) versus in person care; bill my insurance or make self-payment for services provided; and responsibility for payment of non-covered services.     Mode of Communication:  Video Conference via [employer]    As the provider I attest to compliance with applicable laws and regulations related to telemedicine.      NUMBER OF PARTICIPANTS: 6    Summary of Group / Topics Discussed:  Lifestyle Balance and Structure:  OT Goal-setting & integration: To support progress towards treatment goals and psychiatric recovery, group members were led through a weekly structured process to reflect on progress, integrate new learning/skills, and emerging self-awareness into their daily and weekly life. Provided psychoeducation on the neuroscience of change, self-compassion, and the anatomy of a SMART goal to the group. Facilitated the sharing of individual goals with validation, support, and feedback provided.    Patient Session Goals / Objectives:    Reflected on and create a vision for recovery and wellbeing    Identified and wrote 3 SMART goals for the week    Identified and problem solved barriers to achieving identified goals     Identified plan to support  The patient is a 28y Female complaining of abscess. follow through on goals and reflection on progress made        Patient Participation / Response:  Fully participated with the group by sharing personal reflections / insights and openly received / provided feedback with other participants.    Verbalized understanding of content and Patient would benefit from additional opportunities to practice the content to be able to generalize it to their everyday life with increased intentionality, consistency, and efficacy in support of their psychiatric recovery    Treatment Plan:  Patient has an initial individualized treatment plan that was created as part of their diagnostic assessment / admission process.  A master individualized treatment plan is in the process of being developed with the patient and multi-disciplinary care team.    Lavelle Wilhelm, OT

## 2020-07-13 NOTE — H&P ADULT - PROBLEM SELECTOR PROBLEM 4
Patient is asking for a refill of his motrin due to his low back pain. LOV 4/22/20  LRF  3/22/19  RTO No return date listed. Health Maintenance   Topic Date Due    Diabetic foot exam  04/27/1981    Diabetic retinal exam  04/27/1981    HIV screen  04/27/1986    Diabetic microalbuminuria test  03/07/2020    Hepatitis B vaccine (1 of 3 - Risk 3-dose series) 09/27/2020 (Originally 4/27/1990)    Flu vaccine (1) 09/01/2020    Lipid screen  12/10/2020    A1C test (Diabetic or Prediabetic)  04/23/2021    Potassium monitoring  04/23/2021    Creatinine monitoring  04/23/2021    DTaP/Tdap/Td vaccine (2 - Td) 07/02/2023    Pneumococcal 0-64 years Vaccine  Completed    Hepatitis A vaccine  Aged Out    Hib vaccine  Aged Out    Meningococcal (ACWY) vaccine  Aged Out             (applicable per patient's age: Cancer Screenings, Depression Screening, Fall Risk Screening, Immunizations)    Hemoglobin A1C (%)   Date Value   04/23/2020 6.9 (H)   12/10/2019 6.4 (H)   05/09/2019 6.2 (H)     Microalb/Crt. Ratio (mcg/mg creat)   Date Value   03/07/2019 CANNOT BE CALCULATED     LDL Cholesterol (mg/dL)   Date Value   12/10/2019 58     AST (U/L)   Date Value   04/23/2020 18     ALT (U/L)   Date Value   04/23/2020 34     BUN (mg/dL)   Date Value   04/23/2020 12      (goal A1C is < 7)   (goal LDL is <100) need 30-50% reduction from baseline     BP Readings from Last 3 Encounters:   02/07/20 128/86   01/10/20 132/80   12/02/19 126/74    (goal /80)      All Future Testing planned in [facility]:  Lab Frequency Next Occurrence   US DUP LOWER EXTREMITIES BILATERAL VENOUS Once 04/24/2020       Next Visit Date:  No future appointments.          Patient Active Problem List:     Hyperlipidemia     Hypertension     Esophageal reflux     EBV seropositivity     Tobacco abuse     Diabetes mellitus type 2 in obese (HCC)     Adult body mass index 40 and over
Diabetes mellitus of other type without complication

## 2020-07-20 ENCOUNTER — OUTPATIENT (OUTPATIENT)
Dept: OUTPATIENT SERVICES | Facility: HOSPITAL | Age: 30
LOS: 1 days | Discharge: HOME | End: 2020-07-20

## 2020-07-20 DIAGNOSIS — Z90.89 ACQUIRED ABSENCE OF OTHER ORGANS: Chronic | ICD-10-CM

## 2020-07-20 DIAGNOSIS — Z86.69 PERSONAL HISTORY OF OTHER DISEASES OF THE NERVOUS SYSTEM AND SENSE ORGANS: Chronic | ICD-10-CM

## 2020-07-21 DIAGNOSIS — K04.4 ACUTE APICAL PERIODONTITIS OF PULPAL ORIGIN: ICD-10-CM

## 2020-07-22 ENCOUNTER — OUTPATIENT (OUTPATIENT)
Dept: OUTPATIENT SERVICES | Facility: HOSPITAL | Age: 30
LOS: 1 days | Discharge: HOME | End: 2020-07-22

## 2020-07-22 DIAGNOSIS — Z86.69 PERSONAL HISTORY OF OTHER DISEASES OF THE NERVOUS SYSTEM AND SENSE ORGANS: Chronic | ICD-10-CM

## 2020-07-22 DIAGNOSIS — Z90.89 ACQUIRED ABSENCE OF OTHER ORGANS: Chronic | ICD-10-CM

## 2020-07-30 NOTE — PROGRESS NOTE ADULT - REASON FOR ADMISSION
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)      OB (within normal limits)      Other   (+) Anxiety and depression   (+) BMI 32.0-32.9,adult   (+) Depression   (+) Diabetic neuropathy (HCC)       Physical Exam    Airway   Mallampati: II  TM distance: >3 FB  Neck ROM: full       Cardiovascular - normal exam  Rhythm: regular  Rate: normal  (-) murmur     Dental - normal exam           Pulmonary - normal exam  Breath sounds clear to auscultation     Abdominal    Neurological - normal exam                 Anesthesia Plan    ASA 2- EMERGENT   ASA physical status emergent criteria: acute peritonitis    Plan - general       Airway plan will be ETT        Induction: intravenous    Postoperative Plan: Postoperative administration of opioids is intended.    Pertinent diagnostic labs and testing reviewed    Informed Consent:    Anesthetic plan and risks discussed with patient.    Use of blood products discussed with: patient whom consented to blood products.          left arm cellulitis, abscess

## 2020-09-28 ENCOUNTER — EMERGENCY (EMERGENCY)
Facility: HOSPITAL | Age: 30
LOS: 0 days | Discharge: HOME | End: 2020-09-28
Attending: EMERGENCY MEDICINE | Admitting: EMERGENCY MEDICINE
Payer: MEDICARE

## 2020-09-28 VITALS
DIASTOLIC BLOOD PRESSURE: 77 MMHG | RESPIRATION RATE: 18 BRPM | WEIGHT: 218.04 LBS | TEMPERATURE: 100 F | SYSTOLIC BLOOD PRESSURE: 126 MMHG | OXYGEN SATURATION: 96 % | HEIGHT: 66 IN | HEART RATE: 120 BPM

## 2020-09-28 VITALS
OXYGEN SATURATION: 99 % | SYSTOLIC BLOOD PRESSURE: 119 MMHG | RESPIRATION RATE: 18 BRPM | DIASTOLIC BLOOD PRESSURE: 65 MMHG | TEMPERATURE: 99 F | HEART RATE: 93 BPM

## 2020-09-28 DIAGNOSIS — Z79.01 LONG TERM (CURRENT) USE OF ANTICOAGULANTS: ICD-10-CM

## 2020-09-28 DIAGNOSIS — Z86.69 PERSONAL HISTORY OF OTHER DISEASES OF THE NERVOUS SYSTEM AND SENSE ORGANS: Chronic | ICD-10-CM

## 2020-09-28 DIAGNOSIS — I10 ESSENTIAL (PRIMARY) HYPERTENSION: ICD-10-CM

## 2020-09-28 DIAGNOSIS — Z79.899 OTHER LONG TERM (CURRENT) DRUG THERAPY: ICD-10-CM

## 2020-09-28 DIAGNOSIS — Z79.52 LONG TERM (CURRENT) USE OF SYSTEMIC STEROIDS: ICD-10-CM

## 2020-09-28 DIAGNOSIS — F11.29 OPIOID DEPENDENCE WITH UNSPECIFIED OPIOID-INDUCED DISORDER: ICD-10-CM

## 2020-09-28 DIAGNOSIS — F32.9 MAJOR DEPRESSIVE DISORDER, SINGLE EPISODE, UNSPECIFIED: ICD-10-CM

## 2020-09-28 DIAGNOSIS — F17.200 NICOTINE DEPENDENCE, UNSPECIFIED, UNCOMPLICATED: ICD-10-CM

## 2020-09-28 DIAGNOSIS — Z90.89 ACQUIRED ABSENCE OF OTHER ORGANS: Chronic | ICD-10-CM

## 2020-09-28 DIAGNOSIS — E11.9 TYPE 2 DIABETES MELLITUS WITHOUT COMPLICATIONS: ICD-10-CM

## 2020-09-28 DIAGNOSIS — J45.909 UNSPECIFIED ASTHMA, UNCOMPLICATED: ICD-10-CM

## 2020-09-28 DIAGNOSIS — Z88.8 ALLERGY STATUS TO OTHER DRUGS, MEDICAMENTS AND BIOLOGICAL SUBSTANCES: ICD-10-CM

## 2020-09-28 LAB
ANION GAP SERPL CALC-SCNC: 13 MMOL/L — SIGNIFICANT CHANGE UP (ref 7–14)
APAP SERPL-MCNC: <5 UG/ML — LOW (ref 10–30)
APPEARANCE UR: CLEAR — SIGNIFICANT CHANGE UP
BACTERIA # UR AUTO: ABNORMAL
BASOPHILS # BLD AUTO: 0.03 K/UL — SIGNIFICANT CHANGE UP (ref 0–0.2)
BASOPHILS NFR BLD AUTO: 0.3 % — SIGNIFICANT CHANGE UP (ref 0–1)
BILIRUB UR-MCNC: ABNORMAL
BUN SERPL-MCNC: 24 MG/DL — HIGH (ref 10–20)
CALCIUM SERPL-MCNC: 9.5 MG/DL — SIGNIFICANT CHANGE UP (ref 8.5–10.1)
CHLORIDE SERPL-SCNC: 102 MMOL/L — SIGNIFICANT CHANGE UP (ref 98–110)
CO2 SERPL-SCNC: 22 MMOL/L — SIGNIFICANT CHANGE UP (ref 17–32)
COLOR SPEC: YELLOW — SIGNIFICANT CHANGE UP
CREAT SERPL-MCNC: 1 MG/DL — SIGNIFICANT CHANGE UP (ref 0.7–1.5)
DIFF PNL FLD: NEGATIVE — SIGNIFICANT CHANGE UP
EOSINOPHIL # BLD AUTO: 0.68 K/UL — SIGNIFICANT CHANGE UP (ref 0–0.7)
EOSINOPHIL NFR BLD AUTO: 6.3 % — SIGNIFICANT CHANGE UP (ref 0–8)
EPI CELLS # UR: ABNORMAL /HPF
ETHANOL SERPL-MCNC: <10 MG/DL — SIGNIFICANT CHANGE UP
GLUCOSE SERPL-MCNC: 96 MG/DL — SIGNIFICANT CHANGE UP (ref 70–99)
GLUCOSE UR QL: NEGATIVE MG/DL — SIGNIFICANT CHANGE UP
HCG SERPL QL: NEGATIVE — SIGNIFICANT CHANGE UP
HCT VFR BLD CALC: 39.7 % — SIGNIFICANT CHANGE UP (ref 37–47)
HGB BLD-MCNC: 13.2 G/DL — SIGNIFICANT CHANGE UP (ref 12–16)
IMM GRANULOCYTES NFR BLD AUTO: 0.3 % — SIGNIFICANT CHANGE UP (ref 0.1–0.3)
KETONES UR-MCNC: NEGATIVE — SIGNIFICANT CHANGE UP
LEUKOCYTE ESTERASE UR-ACNC: NEGATIVE — SIGNIFICANT CHANGE UP
LYMPHOCYTES # BLD AUTO: 38 % — SIGNIFICANT CHANGE UP (ref 20.5–51.1)
LYMPHOCYTES # BLD AUTO: 4.12 K/UL — HIGH (ref 1.2–3.4)
MCHC RBC-ENTMCNC: 27.7 PG — SIGNIFICANT CHANGE UP (ref 27–31)
MCHC RBC-ENTMCNC: 33.2 G/DL — SIGNIFICANT CHANGE UP (ref 32–37)
MCV RBC AUTO: 83.4 FL — SIGNIFICANT CHANGE UP (ref 81–99)
MONOCYTES # BLD AUTO: 0.76 K/UL — HIGH (ref 0.1–0.6)
MONOCYTES NFR BLD AUTO: 7 % — SIGNIFICANT CHANGE UP (ref 1.7–9.3)
NEUTROPHILS # BLD AUTO: 5.21 K/UL — SIGNIFICANT CHANGE UP (ref 1.4–6.5)
NEUTROPHILS NFR BLD AUTO: 48.1 % — SIGNIFICANT CHANGE UP (ref 42.2–75.2)
NITRITE UR-MCNC: NEGATIVE — SIGNIFICANT CHANGE UP
NRBC # BLD: 0 /100 WBCS — SIGNIFICANT CHANGE UP (ref 0–0)
PH UR: 5 — SIGNIFICANT CHANGE UP (ref 5–8)
PLATELET # BLD AUTO: 272 K/UL — SIGNIFICANT CHANGE UP (ref 130–400)
POTASSIUM SERPL-MCNC: 4 MMOL/L — SIGNIFICANT CHANGE UP (ref 3.5–5)
POTASSIUM SERPL-SCNC: 4 MMOL/L — SIGNIFICANT CHANGE UP (ref 3.5–5)
PROT UR-MCNC: 30 MG/DL
RBC # BLD: 4.76 M/UL — SIGNIFICANT CHANGE UP (ref 4.2–5.4)
RBC # FLD: 14.4 % — SIGNIFICANT CHANGE UP (ref 11.5–14.5)
SALICYLATES SERPL-MCNC: <0.3 MG/DL — LOW (ref 4–30)
SODIUM SERPL-SCNC: 137 MMOL/L — SIGNIFICANT CHANGE UP (ref 135–146)
SP GR SPEC: >=1.03 (ref 1.01–1.03)
UROBILINOGEN FLD QL: 0.2 MG/DL — SIGNIFICANT CHANGE UP (ref 0.2–0.2)
WBC # BLD: 10.83 K/UL — HIGH (ref 4.8–10.8)
WBC # FLD AUTO: 10.83 K/UL — HIGH (ref 4.8–10.8)

## 2020-09-28 PROCEDURE — 99284 EMERGENCY DEPT VISIT MOD MDM: CPT

## 2020-09-28 RX ORDER — SODIUM CHLORIDE 9 MG/ML
1000 INJECTION, SOLUTION INTRAVENOUS ONCE
Refills: 0 | Status: COMPLETED | OUTPATIENT
Start: 2020-09-28 | End: 2020-09-28

## 2020-09-28 RX ADMIN — SODIUM CHLORIDE 1000 MILLILITER(S): 9 INJECTION, SOLUTION INTRAVENOUS at 16:48

## 2020-09-28 NOTE — ED PROVIDER NOTE - ATTENDING CONTRIBUTION TO CARE
I personally evaluated patient. I agree with the findings and plan with all documentation on chart except as documented  in my note.    29 y/o F with multiple medical problems including Polysubstance Abuse who presents with heroin intoxication.  Patient was sleepy and friend called EMS. Patient did not require Narcan and did not have any traumatic injury. Patient observed in the ED on monitor without issue for hours.      VS reviewed. Patient has no signs of life threatening withdrawal. Patient is clinically sober as evidenced by clear speech and stable gait. Patient denies any homicidal or suicidal ideations and is answering questions appropriately.  Patient does not require emergent psychiatric evaluation. Patient given information for outpatient detox.  Patient stable for discharge with proper follow up. Patient has a safe place to go to.    Full DC instructions discussed and patient knows when to seek immediate medical attention.  Patient has proper follow up.  All results discussed and patient aware they may require further work up.  Proper follow up ensured. Limitations of ED work up discussed.  Medications administered and prescribed/OTC home meds discussed.  All questions and concerns from patient or family addressed. Understanding of instructions verbalized.

## 2020-09-28 NOTE — ED ADULT NURSE NOTE - NSIMPLEMENTINTERV_GEN_ALL_ED
Implemented All Fall with Harm Risk Interventions:  [place] to call system. Call bell, personal items and telephone within reach. Instruct patient to call for assistance. Room bathroom lighting operational. Non-slip footwear when patient is off stretcher. Physically safe environment: no spills, clutter or unnecessary equipment. Stretcher in lowest position, wheels locked, appropriate side rails in place. Provide visual cue, wrist band, yellow gown, etc. Monitor gait and stability. Monitor for mental status changes and reorient to person, place, and time. Review medications for side effects contributing to fall risk. Reinforce activity limits and safety measures with patient and family. Provide visual clues: red socks.

## 2020-09-28 NOTE — ED ADULT TRIAGE NOTE - CHIEF COMPLAINT QUOTE
BIBA patient states "I relapsed today on heroin I was with my friend and she called the  when I fell asleep".

## 2020-09-28 NOTE — ED PROVIDER NOTE - PATIENT PORTAL LINK FT
You can access the FollowMyHealth Patient Portal offered by [facility] by registering at the following website: [email or web address]. By joining [facility]’s FollowMyHealth portal, you will also be able to view your health information using other applications (apps) compatible with our system.

## 2020-09-28 NOTE — ED PROVIDER NOTE - OBJECTIVE STATEMENT
31 yo female, pmh of substance abuse, presents to ed for eval. pt states injected one bag of heroin today, was with friend, nodding off with friend, friend called ems, ems did not give narcan and brought in for eval. pt without specific pain or radiation, substance use occurred today. denies fever, chills, cp, sob, abd pain, nvd, si/hi.

## 2020-09-28 NOTE — ED PROVIDER NOTE - PHYSICAL EXAMINATION
Physical Exam    Vital Signs: I have reviewed the initial vital signs.  Constitutional: well-nourished, appears stated age, no acute distress  Eyes: Conjunctiva pink, Sclera clear,  Cardiovascular: S1 and S2, regular rate, regular rhythm, well-perfused extremities, radial pulses equal and 2+  Respiratory: unlabored respiratory effort, clear to auscultation bilaterally no wheezing, rales and rhonchi  Gastrointestinal: soft, non-tender abdomen, no pulsatile mass, normal bowl sounds  Musculoskeletal: supple neck, no lower extremity edema, no midline tenderness  Integumentary: warm, dry, no rash  Neurologic: awake, alert, nvi  Psychiatric: appropriate mood, appropriate affect

## 2020-09-28 NOTE — ED PROVIDER NOTE - CLINICAL SUMMARY MEDICAL DECISION MAKING FREE TEXT BOX
31 y/o F with multiple medical problems including Polysubstance Abuse who presents with heroin intoxication.  Patient was sleepy and friend called EMS. Patient did not require Narcan and did not have any traumatic injury. Patient observed in the ED on monitor without issue for hours.      VS reviewed. Patient has no signs of life threatening withdrawal. Patient is clinically sober as evidenced by clear speech and stable gait. Patient denies any homicidal or suicidal ideations and is answering questions appropriately.  Patient does not require emergent psychiatric evaluation. Patient given information for outpatient detox.  Patient stable for discharge with proper follow up. Patient has a safe place to go to.

## 2020-09-28 NOTE — ED PROVIDER NOTE - NSFOLLOWUPCLINICS_GEN_ALL_ED_FT
[facility]  Detox Mgmt  [address] [place] [address]  Phone: [phone]  Fax:   Follow Up Time: 1-3 Days

## 2020-10-07 ENCOUNTER — EMERGENCY (EMERGENCY)
Facility: HOSPITAL | Age: 30
LOS: 0 days | Discharge: HOME | End: 2020-10-07
Attending: EMERGENCY MEDICINE | Admitting: EMERGENCY MEDICINE
Payer: MEDICARE

## 2020-10-07 VITALS
OXYGEN SATURATION: 98 % | RESPIRATION RATE: 18 BRPM | SYSTOLIC BLOOD PRESSURE: 137 MMHG | HEIGHT: 66 IN | DIASTOLIC BLOOD PRESSURE: 78 MMHG | WEIGHT: 265 LBS | TEMPERATURE: 97 F | HEART RATE: 98 BPM

## 2020-10-07 DIAGNOSIS — Z90.89 ACQUIRED ABSENCE OF OTHER ORGANS: Chronic | ICD-10-CM

## 2020-10-07 DIAGNOSIS — F32.9 MAJOR DEPRESSIVE DISORDER, SINGLE EPISODE, UNSPECIFIED: ICD-10-CM

## 2020-10-07 DIAGNOSIS — F11.10 OPIOID ABUSE, UNCOMPLICATED: ICD-10-CM

## 2020-10-07 DIAGNOSIS — L02.91 CUTANEOUS ABSCESS, UNSPECIFIED: ICD-10-CM

## 2020-10-07 DIAGNOSIS — E11.9 TYPE 2 DIABETES MELLITUS WITHOUT COMPLICATIONS: ICD-10-CM

## 2020-10-07 DIAGNOSIS — I10 ESSENTIAL (PRIMARY) HYPERTENSION: ICD-10-CM

## 2020-10-07 DIAGNOSIS — Z98.890 OTHER SPECIFIED POSTPROCEDURAL STATES: ICD-10-CM

## 2020-10-07 DIAGNOSIS — Z23 ENCOUNTER FOR IMMUNIZATION: ICD-10-CM

## 2020-10-07 DIAGNOSIS — L02.413 CUTANEOUS ABSCESS OF RIGHT UPPER LIMB: ICD-10-CM

## 2020-10-07 DIAGNOSIS — Z86.69 PERSONAL HISTORY OF OTHER DISEASES OF THE NERVOUS SYSTEM AND SENSE ORGANS: Chronic | ICD-10-CM

## 2020-10-07 DIAGNOSIS — Z79.899 OTHER LONG TERM (CURRENT) DRUG THERAPY: ICD-10-CM

## 2020-10-07 DIAGNOSIS — J45.909 UNSPECIFIED ASTHMA, UNCOMPLICATED: ICD-10-CM

## 2020-10-07 DIAGNOSIS — F17.200 NICOTINE DEPENDENCE, UNSPECIFIED, UNCOMPLICATED: ICD-10-CM

## 2020-10-07 DIAGNOSIS — Z88.8 ALLERGY STATUS TO OTHER DRUGS, MEDICAMENTS AND BIOLOGICAL SUBSTANCES: ICD-10-CM

## 2020-10-07 DIAGNOSIS — Z79.52 LONG TERM (CURRENT) USE OF SYSTEMIC STEROIDS: ICD-10-CM

## 2020-10-07 PROCEDURE — 99283 EMERGENCY DEPT VISIT LOW MDM: CPT | Mod: 25

## 2020-10-07 PROCEDURE — 73090 X-RAY EXAM OF FOREARM: CPT | Mod: 26,RT

## 2020-10-07 PROCEDURE — 10060 I&D ABSCESS SIMPLE/SINGLE: CPT

## 2020-10-07 RX ORDER — TETANUS TOXOID, REDUCED DIPHTHERIA TOXOID AND ACELLULAR PERTUSSIS VACCINE, ADSORBED 5; 2.5; 8; 8; 2.5 [IU]/.5ML; [IU]/.5ML; UG/.5ML; UG/.5ML; UG/.5ML
0.5 SUSPENSION INTRAMUSCULAR ONCE
Refills: 0 | Status: COMPLETED | OUTPATIENT
Start: 2020-10-07 | End: 2020-10-07

## 2020-10-07 RX ADMIN — TETANUS TOXOID, REDUCED DIPHTHERIA TOXOID AND ACELLULAR PERTUSSIS VACCINE, ADSORBED 0.5 MILLILITER(S): 5; 2.5; 8; 8; 2.5 SUSPENSION INTRAMUSCULAR at 09:59

## 2020-10-07 NOTE — ED PROVIDER NOTE - PATIENT PORTAL LINK FT
You can access the FollowMyHealth Patient Portal offered by [facility] by registering at the following website: [email or web address]. By joining [facility]’s FollowMyHealth portal, you will also be able to view your health information using other applications (apps) compatible with our system.

## 2020-10-07 NOTE — ED PROVIDER NOTE - ATTENDING CONTRIBUTION TO CARE
29yo F with PMHx IVDA/ polysubstance abuse (opioids, cocaine, meth), HTN, Hep C, asthma, DM, PCOS, ?PE on eliquis, presents for left forearm abscess for 3 days. Pt states she injected heroin at the site prior to symptoms onset. Took 4 doses of augmentin and clindamycin already. Denies fever. Denies numbness, tingling. Denies CP, SOB, cough, nausea, vomiting, diarrhea, abd pain.    Vital signs reviewed  GENERAL: Patient nontoxic appearing, NAD  RESPIRATORY: Normal respiratory effort. CTA B/L. No wheezing, rales, rhonchi  CARDIOVASCULAR: Regular rate and rhythm  MUSCULOSKELETAL/EXTREMITIES: Brisk cap refill. Equal radial pulses. Full ROM of LUE.  SKIN:  Warm and dry. Approx 2x2cm area of induration/ fluctuance on volar aspect of mid forearm. Surrounding mild erythema proximally and distally along volar aspect of forearm without crossing wrist or elbow joint. 31yo F with PMHx IVDA/ polysubstance abuse (opioids, cocaine, meth), HTN, Hep C, asthma, DM, PCOS, ?PE on eliquis, presents for right forearm abscess for 3 days. Pt states she injected heroin at the site prior to symptoms onset. Took 4 doses of augmentin and clindamycin already. Denies fever. Denies numbness, tingling. Denies CP, SOB, cough, nausea, vomiting, diarrhea, abd pain.    Vital signs reviewed  GENERAL: Patient nontoxic appearing, NAD  RESPIRATORY: Normal respiratory effort. CTA B/L. No wheezing, rales, rhonchi  CARDIOVASCULAR: Regular rate and rhythm  MUSCULOSKELETAL/EXTREMITIES: Brisk cap refill. Equal radial pulses. Full ROM of RUE.  SKIN:  Warm and dry. Approx 2x2cm area of induration/ fluctuance on volar aspect of right mid forearm. Surrounding mild erythema proximally and distally along volar aspect of forearm without crossing wrist or elbow joint.

## 2020-10-07 NOTE — ED PROVIDER NOTE - OBJECTIVE STATEMENT
Patient c/o abscess to left forearm after shooting heroin 3 days ago. Seen by PMD and placed on augmentin and clinda. Took 4 doses with worsening pain and swelling, No fever, no arm numbness

## 2020-10-07 NOTE — ED PROVIDER NOTE - NSFOLLOWUPCLINICS_GEN_ALL_ED_FT
[facility]  Surgery  [address] [place] [address]  Phone: [phone]  Fax:   Follow Up Time: 1-3 Days

## 2020-10-07 NOTE — ED PROVIDER NOTE - ENMT, MLM
----- Message from Amanda Hackett RN sent at 3/8/2019  5:25 PM CST -----  Regarding: FW: referrals  Please assist. Thanks!  ----- Message -----  From: Jonah Vázquez MD  Sent: 3/7/2019   5:17 PM  To: Amanda Hackett RN  Subject: referrals                                        Please arrange a referral for mammogram for Lacey Jenkins.  Please also arrange a GI referral for screening colonoscopy.    Thank you    Americo Vázquez     Airway patent, Nasal mucosa clear. Mouth with normal mucosa. Throat has no vesicles, no oropharyngeal exudates and uvula is midline.

## 2020-10-07 NOTE — ED PROVIDER NOTE - CLINICAL SUMMARY MEDICAL DECISION MAKING FREE TEXT BOX
Left forearm abscess after shooting heroin 3 days ago. I&D performed. Vitals stable. Continue ABx. Return precautions given. Left forearm abscess after shooting heroin 3 days ago. I&D performed. Xray shows soft tissue swelling without subcutaneous air or retained FB. Vitals stable. Continue ABx. Return precautions given.

## 2021-02-14 NOTE — BEHAVIORAL HEALTH ASSESSMENT NOTE - SUMMARY
[age] yes. old female with H/O Bipolar D/O ,stable on medications. She is dependent on Heroin ,Crack cocaine and crystal meth. she was crying while talking to father expressing her frustrations. She is not psychotic. Denied suicidal or homicidal ideation. She has good insight and judgement.

## 2021-12-17 NOTE — BEHAVIORAL HEALTH ASSESSMENT NOTE - IMPULSE CONTROL
Clint Teresita. underwent a 6 min walk test(Unable o complete 6min. Only walk 4min). His initial O2 93% saturation was  97 % and his baseline heart rate was  92 BPM. He walked from  to  at a distance of  .  His post O2  saturation was   95% and his post walk heart rate was  100 BPM. Normal

## 2022-04-27 NOTE — ED ADULT NURSE NOTE - NS ED NURSE DISCH DISPOSITION
Physical Therapy     Referred by: Kiesha Kaur DO; Medical Diagnosis (from order):    Diagnosis Information      Diagnosis    780.4 (ICD-9-CM) - R42 (ICD-10-CM) - Vertigo    346.80 (ICD-9-CM) - G43.809 (ICD-10-CM) - Vestibular migraine                Daily Treatment Note    Visit:  4     SUBJECTIVE                                                                                                               Reports she has a headache today in the back of the neck and the back of her head.    OBJECTIVE                                                                                                                     Observation:   Cervical: forward head  Shoulder: rounded  Spine: increased thoracic kyphosis  Comments / Details: Convergence: impaired R eye     Palpation:   Spine - Left: Sub Occipitals: hypertonic and tenderness; Cervical Paraspinals: hypertonic; Upper Trapezius: hypertonic;   Spine - Right: Sub Occipitals: hypertonic and tenderness; Cervical Paraspinals: hypertonic; Upper Trapezius: hypertonic;       TREATMENT                                                                                                                  Therapeutic Exercise:  - Seated chin tucks 2 x 10  - Seated scap retractions 2 x 10    Manual Therapy:  - Suboccipital release, upper trap STM cheryl, cervical paraspinal STM x 8 minutes      Neuromuscular Re-Education:  - Gaze stabilization w/ cervical rotation 10 x 6  - Standing balance w/ visual occlusion (30 seconds x 6) CGA-Jacquie    Skilled input: verbal instruction/cues    Writer verbally educated and received verbal consent for hand placement, positioning of patient, and techniques to be performed today from patient for hand placement and palpation for techniques and therapist position for techniques as described above and how they are pertinent to the patient's plan of care.    Home Exercise Program/Education Materials: Access Code: [email or web address]  URL:  [email or web address]  Date: 04/28/2022  Prepared by: Ligia Harrison    Exercises  · Seated Gaze Stabilization with Head Rotation - 1 x daily - 7 x weekly - 3 sets - 5 reps  · Cervical Retraction at Wall - 1 x daily - 7 x weekly - 3 sets - 10 reps     ASSESSMENT                                                                                                             Patient presents w/ good response to STM to cervical musculature in terms of headache reduction. No improvement shown in ability to maintain gaze stabilization w/ cervical rotation arom. Initially does well w/ static standing balance w/ visual occlusion though worsens w/ increased repetitions requiring Jacquie to stabilize. Will cont per POC.       PLAN                                                                                                                           Suggestions for next session as indicated: Progress per plan of care         Therapy procedure time and total treatment time can be found documented on the Time Entry flowsheet   Discharged

## 2022-05-29 NOTE — PROGRESS NOTE ADULT - PROBLEM SELECTOR PROBLEM 8
Problem: Pain  Goal: #Acceptable pain level achieved/maintained at rest using NRS/Faces  Description: This goal is used for patients who can self-report.  Acceptable means the level is at or below the identified comfort/function goal.  Outcome: Outcome Met, Continue evaluating goal progress toward completion  Goal: # Acceptable pain level achieved/maintained with activity using NRS/Faces  Description: This goal is used for patients who can self-report and are not achieving acceptable pain control during activity.  Outcome: Outcome Met, Continue evaluating goal progress toward completion  Goal: Acceptable pain/comfort level is achieved/maintained at rest (based on Pain Behaviors Scale)  Description: This goal is used for patients who are not able to self-report pain and are assessed for pain using the Pain Behaviors Scale  Outcome: Outcome Met, Continue evaluating goal progress toward completion  Goal: # Verbalizes understanding of pain management  Description: Documented in Patient Education Activity  Outcome: Outcome Met, Continue evaluating goal progress toward completion     Problem: At Risk for Falls  Goal: # Patient does not fall  Outcome: Outcome Met, Continue evaluating goal progress toward completion  Goal: # Takes action to control fall-related risks  Outcome: Outcome Met, Continue evaluating goal progress toward completion  Goal: # Verbalizes understanding of fall risk/precautions  Description: Document education using the patient education activity  Outcome: Outcome Met, Continue evaluating goal progress toward completion     Problem: Activity Intolerance  Goal: # Functional status is maintained or returned to baseline  Outcome: Outcome Met, Continue evaluating goal progress toward completion  Goal: # Tolerates activity for d/c setting with no clinical problems  Outcome: Outcome Met, Continue evaluating goal progress toward completion      Cellulitis of left upper extremity

## 2022-11-01 NOTE — ED ADULT TRIAGE NOTE - CCCP TRG CHIEF CMPLNT
swelling of lower extremities Rituxan Counseling:  I discussed with the patient the risks of Rituxan infusions. Side effects can include infusion reactions, severe drug rashes including mucocutaneous reactions, reactivation of latent hepatitis and other infections and rarely progressive multifocal leukoencephalopathy.  All of the patient's questions and concerns were addressed.

## 2023-02-11 NOTE — BEHAVIORAL HEALTH ASSESSMENT NOTE - DESCRIPTION
What Type Of Note Output Would You Prefer (Optional)?: Standard Output How Severe Is Your Skin Lesion?: moderate Has Your Skin Lesion Been Treated?: not been treated Is This A New Presentation, Or A Follow-Up?: Growths Diabetes. Chronic pain.

## 2023-05-04 NOTE — ED ADULT TRIAGE NOTE - SOURCE OF INFORMATION
Quality 431: Preventive Care And Screening: Unhealthy Alcohol Use - Screening: Patient not identified as an unhealthy alcohol user when screened for unhealthy alcohol use using a systematic screening method
Patient
Quality 110: Preventive Care And Screening: Influenza Immunization: Influenza Immunization previously received during influenza season
Detail Level: Detailed
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
## 2024-03-05 NOTE — ED PROVIDER NOTE - CHIEF COMPLAINT
The patient is a 29y Female complaining of swelling of lower extremities. [None] : none [Normal] : normal/intact; no suicidal/homicidal ideation intent or plan [Anxious] : anxious [Afraid] : afraid [FreeTextEntry8] : "I'm anxious." better [Constricted] : constricted [FreeTextEntry9] : better

## 2024-11-26 NOTE — CHART NOTE - NSCHARTNOTEFT_GEN_A_CORE
Allergies:  BuSpar (Unknown)  clonidine (Unknown)  Lamictal (Unknown)  Nicotine Patch (Rash (Mild))      Diet: Regular    Activity: as tolerated    Follow up with    1. PMD in 2 weeks    2. Psych in 2 weeks    3.    Follow up for abnormal labs/tests    1.    Extra Instructions:      Flu Vaccine given  Yes_____         No______      Diagnosis:  Chemical Dependency   Maintain sobriety  refrain from all use      Patient Signature___________________________________________  Date_________________      Nurse Signature_____________________________________________Date_________________ 15

## 2024-12-13 NOTE — ED ADULT TRIAGE NOTE - NS ED TRIAGE AVPU SCALE
Alert-The patient is alert, awake and responds to voice. The patient is oriented to time, place, and person. The triage nurse is able to obtain subjective information. Audra Burciaga

## 2025-07-30 NOTE — ED ADULT NURSE NOTE - CONTEXT
Anesthesia Post-op Note    Patient: Cesilia Lawrence  Procedure(s) Performed: ESOPHAGOGASTRODUODENOSCOPY (EGD)  Anesthesia type: MAC    Vitals Value Taken Time   Temp 36 07/30/25 1317   Pulse 86 07/30/25 1317   Resp 13 07/30/25 1317   SpO2 99 07/30/25 1317   BP 87/61 07/30/25 1317         Patient Location: PACU Phase 1  Post-op Vital Signs:stable  Level of Consciousness: awake and alert  Respiratory Status: spontaneous ventilation  Cardiovascular stable  Hydration: euvolemic  Pain Management: adequately controlled  Handoff: Handoff to receiving clinician was performed and questions were answered  Vomiting: none  Nausea: None  Airway Patency:patent  Post-op Assessment: no complications and patient tolerated procedure well      No notable events documented.                      
unknown